# Patient Record
Sex: MALE | Race: WHITE | NOT HISPANIC OR LATINO | Employment: FULL TIME | URBAN - METROPOLITAN AREA
[De-identification: names, ages, dates, MRNs, and addresses within clinical notes are randomized per-mention and may not be internally consistent; named-entity substitution may affect disease eponyms.]

---

## 2017-02-06 ENCOUNTER — PATIENT MESSAGE (OUTPATIENT)
Dept: INTERNAL MEDICINE | Facility: CLINIC | Age: 32
End: 2017-02-06

## 2017-02-06 RX ORDER — ALBUTEROL SULFATE 90 UG/1
AEROSOL, METERED RESPIRATORY (INHALATION)
Qty: 18 G | Refills: 11 | Status: SHIPPED | OUTPATIENT
Start: 2017-02-06 | End: 2017-02-23 | Stop reason: SDUPTHER

## 2017-02-07 ENCOUNTER — OFFICE VISIT (OUTPATIENT)
Dept: INTERNAL MEDICINE | Facility: CLINIC | Age: 32
End: 2017-02-07
Payer: COMMERCIAL

## 2017-02-07 VITALS
TEMPERATURE: 98 F | WEIGHT: 248.25 LBS | SYSTOLIC BLOOD PRESSURE: 122 MMHG | RESPIRATION RATE: 16 BRPM | HEART RATE: 101 BPM | OXYGEN SATURATION: 96 % | HEIGHT: 72 IN | DIASTOLIC BLOOD PRESSURE: 88 MMHG | BODY MASS INDEX: 33.62 KG/M2

## 2017-02-07 DIAGNOSIS — J45.901 ASTHMA EXACERBATION: Primary | ICD-10-CM

## 2017-02-07 PROCEDURE — 3079F DIAST BP 80-89 MM HG: CPT | Mod: S$GLB,,, | Performed by: FAMILY MEDICINE

## 2017-02-07 PROCEDURE — 99999 PR PBB SHADOW E&M-EST. PATIENT-LVL III: CPT | Mod: PBBFAC,,, | Performed by: FAMILY MEDICINE

## 2017-02-07 PROCEDURE — 99214 OFFICE O/P EST MOD 30 MIN: CPT | Mod: S$GLB,,, | Performed by: FAMILY MEDICINE

## 2017-02-07 PROCEDURE — 3074F SYST BP LT 130 MM HG: CPT | Mod: S$GLB,,, | Performed by: FAMILY MEDICINE

## 2017-02-07 RX ORDER — FLUTICASONE FUROATE AND VILANTEROL 100; 25 UG/1; UG/1
1 POWDER RESPIRATORY (INHALATION) DAILY
Qty: 60 EACH | Refills: 11 | Status: SHIPPED | OUTPATIENT
Start: 2017-02-07 | End: 2017-05-25 | Stop reason: ALTCHOICE

## 2017-02-07 RX ORDER — AZELASTINE 1 MG/ML
1 SPRAY, METERED NASAL 2 TIMES DAILY
Qty: 30 ML | Refills: 11 | Status: SHIPPED | OUTPATIENT
Start: 2017-02-07 | End: 2017-05-25 | Stop reason: ALTCHOICE

## 2017-02-07 RX ORDER — MONTELUKAST SODIUM 10 MG/1
10 TABLET ORAL NIGHTLY
Qty: 30 TABLET | Refills: 11 | Status: SHIPPED | OUTPATIENT
Start: 2017-02-07 | End: 2017-05-25 | Stop reason: ALTCHOICE

## 2017-02-07 RX ORDER — BENZONATATE 200 MG/1
200 CAPSULE ORAL 3 TIMES DAILY PRN
Qty: 30 CAPSULE | Refills: 0 | Status: SHIPPED | OUTPATIENT
Start: 2017-02-07 | End: 2017-02-17

## 2017-02-07 NOTE — MR AVS SNAPSHOT
[facility]   [facility]  [place] [address]  Phone: [phone]  Fax: [phone]                  Roman Quinn   2017 8:40 AM   Office Visit    Description:  Male : 1985   Provider:  Hung Reid MD   Department:  [facility]           Reason for Visit     Shortness of Breath           Diagnoses this Visit        Comments    Asthma exacerbation    -  Primary            To Do List           Goals (5 Years of Data)     None      Follow-Up and Disposition     Return if symptoms worsen or fail to improve.       These Medications        Disp Refills Start End    fluticasone-vilanterol (BREO) 100-25 mcg/dose diskus inhaler 60 each 11 2017     Inhale 1 puff into the lungs once daily. Controller - Inhalation    Pharmacy: [facility]/pharmacy #[id number] - [place], [place] - [address] #: [phone]       azelastine (ASTELIN) 137 mcg (0.1 %) nasal spray 30 mL 11 2017    1 spray (137 mcg total) by Nasal route 2 (two) times daily. - Nasal    Pharmacy: [facility]/pharmacy #[id number] - [place], [place] - [address] #: [phone]       benzonatate (TESSALON) 200 MG capsule 30 capsule 0 2017    Take 1 capsule (200 mg total) by mouth 3 (three) times daily as needed for Cough. - Oral    Pharmacy: [facility]/pharmacy #[id number] - [place], [place] - [address] #: [phone]       montelukast (SINGULAIR) 10 mg tablet 30 tablet 11 2017    Take 1 tablet (10 mg total) by mouth every evening. - Oral    Pharmacy: [facility]/pharmacy #[id number] - [place], [place] - [address] #: [phone]         [facility]     [facility] On Call Nurse Care Line -  Assistance  Registered nurses in the [facility] On Call Center provide clinical advisement, health education, appointment booking, and other advisory services.  Call for this free service at [phone].             Medications           Message  regarding Medications     Verify the changes and/or additions to your medication regime listed below are the same as discussed with your clinician today.  If any of these changes or additions are incorrect, please notify your healthcare provider.        START taking these NEW medications        Refills    fluticasone-vilanterol (BREO) 100-25 mcg/dose diskus inhaler 11    Sig: Inhale 1 puff into the lungs once daily. Controller    Class: Normal    Route: Inhalation    azelastine (ASTELIN) 137 mcg (0.1 %) nasal spray 11    Si spray (137 mcg total) by Nasal route 2 (two) times daily.    Class: Normal    Route: Nasal    benzonatate (TESSALON) 200 MG capsule 0    Sig: Take 1 capsule (200 mg total) by mouth 3 (three) times daily as needed for Cough.    Class: Normal    Route: Oral    montelukast (SINGULAIR) 10 mg tablet 11    Sig: Take 1 tablet (10 mg total) by mouth every evening.    Class: Normal    Route: Oral           Verify that the below list of medications is an accurate representation of the medications you are currently taking.  If none reported, the list may be blank. If incorrect, please contact your healthcare provider. Carry this list with you in case of emergency.           Current Medications     albuterol (PROAIR HFA) 90 mcg/actuation inhaler inhale 2 puffs by mouth every 6 hours if needed for shortness of breath and wheezing    amlodipine-olmesartan (HANG) 5-20 mg per tablet Take 1 tablet by mouth once daily.    dextromethorphan-guaifenesin  mg (MUCINEX DM)  mg per 12 hr tablet Take 1 tablet by mouth every 12 (twelve) hours.    fluticasone (FLONASE) 50 mcg/actuation nasal spray 2 sprays by Each Nare route once daily.    simvastatin (ZOCOR) 20 MG tablet Take 1 tablet (20 mg total) by mouth every evening.    azelastine (ASTELIN) 137 mcg (0.1 %) nasal spray 1 spray (137 mcg total) by Nasal route 2 (two) times daily.    benzonatate (TESSALON) 200 MG capsule Take 1 capsule (200 mg total) by  mouth 3 (three) times daily as needed for Cough.    desoximetasone (TOPICORT) 0.25 % cream Apply topically 2 (two) times daily.    fluticasone-vilanterol (BREO) 100-25 mcg/dose diskus inhaler Inhale 1 puff into the lungs once daily. Controller    montelukast (SINGULAIR) 10 mg tablet Take 1 tablet (10 mg total) by mouth every evening.           Clinical Reference Information           Your Vitals Were     BP Pulse Temp Resp Height Weight    122/88 (BP Location: Right arm, Patient Position: Sitting, BP Method: Manual) 101 98.4 °F (36.9 °C) (Oral) 16 6' (1.829 m) 112.6 kg (248 lb 3.8 oz)    SpO2 BMI             96% 33.67 kg/m2         Blood Pressure          Most Recent Value    BP  122/88      Allergies as of 2/7/2017     Iodine And Iodide Containing Products      Immunizations Administered on Date of Encounter - 2/7/2017     None      Language Assistance Services     ATTENTION: Language assistance services are available, free of charge. Please call [phone].      ATENCIÓN: Si habla marianañol, tiene a flores disposición servicios gratuitos de asistencia lingüística. Llame al [phone].     JASSON Ý: N?u b?n nói Ti?ng Vi?t, có các d?ch v? h? tr? ngôn ng? mi?n phí dành cho b?n. G?i s? [phone].         [facility] complies with applicable Federal civil rights laws and does not discriminate on the basis of race, color, national origin, age, disability, or sex.

## 2017-02-07 NOTE — PROGRESS NOTES
Subjective:       Patient ID: Roman Quinn is a 31 y.o. male.    Chief Complaint: Shortness of Breath    HPI 31-year-old white male with seasonal asthma presents to clinic today secondary to a complaint of worsening asthma symptoms with increased coughing, shortness of breath, wheezing, nasal congestion, postnasal drip, and runny nose for the past 2 weeks.  He feels that he has been using his pro-air inhaler more frequently and has lost count of the number of times per day he is required to use his inhaler.  He also reports frequently awakening at night to have to use his inhaler.  He has also started using Mucinex D without relief.  Review of Systems   Constitutional: Negative for appetite change, chills, fatigue and fever.   HENT: Positive for congestion, postnasal drip and rhinorrhea. Negative for ear pain, hearing loss, sinus pressure, sore throat and tinnitus.    Eyes: Negative for redness, itching and visual disturbance.   Respiratory: Positive for cough, chest tightness, shortness of breath and wheezing.    Cardiovascular: Negative for chest pain and palpitations.   Gastrointestinal: Negative for abdominal pain, constipation, diarrhea, nausea and vomiting.   Genitourinary: Negative for decreased urine volume, difficulty urinating, dysuria, frequency, hematuria and urgency.   Musculoskeletal: Negative for back pain, myalgias, neck pain and neck stiffness.   Skin: Negative for rash.   Neurological: Negative for dizziness, light-headedness and headaches.   Psychiatric/Behavioral: Negative.        Objective:      Physical Exam   Constitutional: He is oriented to person, place, and time. He appears well-developed and well-nourished. No distress.   HENT:   Head: Normocephalic and atraumatic.   Right Ear: Hearing, tympanic membrane, external ear and ear canal normal.   Left Ear: Hearing, tympanic membrane, external ear and ear canal normal.   Nose: Mucosal edema and rhinorrhea present.   Mouth/Throat:  Oropharynx is clear and moist. No oropharyngeal exudate.   Eyes: Conjunctivae and EOM are normal. Pupils are equal, round, and reactive to light. Right eye exhibits no discharge. Left eye exhibits no discharge. No scleral icterus.   Neck: Normal range of motion. Neck supple. No JVD present. No tracheal deviation present. No thyromegaly present.   Cardiovascular: Normal rate, regular rhythm, normal heart sounds and intact distal pulses.  Exam reveals no gallop and no friction rub.    No murmur heard.  Pulmonary/Chest: Effort normal. No stridor. No respiratory distress. He has wheezes. He has no rales.   Abdominal: Soft. Bowel sounds are normal. He exhibits no distension and no mass. There is no tenderness. There is no rebound and no guarding.   Musculoskeletal: Normal range of motion. He exhibits no edema or tenderness.   Lymphadenopathy:     He has no cervical adenopathy.   Neurological: He is alert and oriented to person, place, and time.   Skin: Skin is warm and dry. No rash noted. He is not diaphoretic. No erythema. No pallor.   Psychiatric: He has a normal mood and affect. His behavior is normal. Judgment and thought content normal.   Nursing note and vitals reviewed.      Assessment:       1. Asthma exacerbation        Plan:       Asthma exacerbation  -     fluticasone-vilanterol (BREO) 100-25 mcg/dose diskus inhaler; Inhale 1 puff into the lungs once daily. Controller  Dispense: 60 each; Refill: 11  -     azelastine (ASTELIN) 137 mcg (0.1 %) nasal spray; 1 spray (137 mcg total) by Nasal route 2 (two) times daily.  Dispense: 30 mL; Refill: 11  -     benzonatate (TESSALON) 200 MG capsule; Take 1 capsule (200 mg total) by mouth 3 (three) times daily as needed for Cough.  Dispense: 30 capsule; Refill: 0  -     montelukast (SINGULAIR) 10 mg tablet; Take 1 tablet (10 mg total) by mouth every evening.  Dispense: 30 tablet; Refill: 11     continue albuterol HFA 2 puffs inhaled every 6 hours when necessary shortness of  breath or wheezing.  Continue Flonase nasal spray 2 sprays per nostril daily.  Return to clinic as needed if symptoms persist or worsen.

## 2017-02-23 ENCOUNTER — PATIENT MESSAGE (OUTPATIENT)
Dept: INTERNAL MEDICINE | Facility: CLINIC | Age: 32
End: 2017-02-23

## 2017-02-23 RX ORDER — ALBUTEROL SULFATE 90 UG/1
AEROSOL, METERED RESPIRATORY (INHALATION)
Qty: 18 G | Refills: 11 | Status: SHIPPED | OUTPATIENT
Start: 2017-02-23 | End: 2017-05-25

## 2017-02-23 RX ORDER — ALBUTEROL SULFATE 90 UG/1
AEROSOL, METERED RESPIRATORY (INHALATION)
Qty: 8.5 INHALER | Refills: 10 | Status: SHIPPED | OUTPATIENT
Start: 2017-02-23 | End: 2017-05-25 | Stop reason: SDUPTHER

## 2017-05-05 ENCOUNTER — TELEPHONE (OUTPATIENT)
Dept: INTERNAL MEDICINE | Facility: CLINIC | Age: 32
End: 2017-05-05

## 2017-05-05 DIAGNOSIS — Z00.00 ANNUAL PHYSICAL EXAM: Primary | ICD-10-CM

## 2017-05-05 NOTE — TELEPHONE ENCOUNTER
----- Message from Sunshine Fraser sent at 5/5/2017  9:53 AM CDT -----  Contact: fyi  Doctor appointment and lab have been scheduled.  Please link lab orders to the lab appointment.  Date of doctor appointment:  05/25/17  Physical or EP:  epp  Date of lab appointment:  05/22/17  Comments:

## 2017-05-22 ENCOUNTER — LAB VISIT (OUTPATIENT)
Dept: LAB | Facility: HOSPITAL | Age: 32
End: 2017-05-22
Attending: FAMILY MEDICINE
Payer: COMMERCIAL

## 2017-05-22 ENCOUNTER — PATIENT MESSAGE (OUTPATIENT)
Dept: INTERNAL MEDICINE | Facility: CLINIC | Age: 32
End: 2017-05-22

## 2017-05-22 DIAGNOSIS — Z00.00 ANNUAL PHYSICAL EXAM: ICD-10-CM

## 2017-05-22 DIAGNOSIS — R73.01 IMPAIRED FASTING GLUCOSE: ICD-10-CM

## 2017-05-22 LAB
25(OH)D3+25(OH)D2 SERPL-MCNC: 24 NG/ML
ALBUMIN SERPL BCP-MCNC: 4.1 G/DL
ALP SERPL-CCNC: 58 U/L
ALT SERPL W/O P-5'-P-CCNC: 15 U/L
ANION GAP SERPL CALC-SCNC: 11 MMOL/L
AST SERPL-CCNC: 23 U/L
BASOPHILS # BLD AUTO: 0.04 K/UL
BASOPHILS NFR BLD: 0.6 %
BILIRUB SERPL-MCNC: 1 MG/DL
BUN SERPL-MCNC: 15 MG/DL
CALCIUM SERPL-MCNC: 9.4 MG/DL
CHLORIDE SERPL-SCNC: 103 MMOL/L
CHOLEST/HDLC SERPL: 4.2 {RATIO}
CO2 SERPL-SCNC: 26 MMOL/L
CREAT SERPL-MCNC: 1.2 MG/DL
DIFFERENTIAL METHOD: NORMAL
EOSINOPHIL # BLD AUTO: 0.3 K/UL
EOSINOPHIL NFR BLD: 4.2 %
ERYTHROCYTE [DISTWIDTH] IN BLOOD BY AUTOMATED COUNT: 12.7 %
EST. GFR  (AFRICAN AMERICAN): >60 ML/MIN/1.73 M^2
EST. GFR  (NON AFRICAN AMERICAN): >60 ML/MIN/1.73 M^2
GLUCOSE SERPL-MCNC: 129 MG/DL
HCT VFR BLD AUTO: 46.2 %
HDL/CHOLESTEROL RATIO: 23.7 %
HDLC SERPL-MCNC: 194 MG/DL
HDLC SERPL-MCNC: 46 MG/DL
HGB BLD-MCNC: 15.8 G/DL
LDLC SERPL CALC-MCNC: 127.2 MG/DL
LYMPHOCYTES # BLD AUTO: 2.2 K/UL
LYMPHOCYTES NFR BLD: 31 %
MCH RBC QN AUTO: 30.7 PG
MCHC RBC AUTO-ENTMCNC: 34.2 %
MCV RBC AUTO: 90 FL
MONOCYTES # BLD AUTO: 0.7 K/UL
MONOCYTES NFR BLD: 10.2 %
NEUTROPHILS # BLD AUTO: 3.8 K/UL
NEUTROPHILS NFR BLD: 54 %
NONHDLC SERPL-MCNC: 148 MG/DL
PLATELET # BLD AUTO: 269 K/UL
PMV BLD AUTO: 10.3 FL
POTASSIUM SERPL-SCNC: 3.8 MMOL/L
PROT SERPL-MCNC: 7.2 G/DL
RBC # BLD AUTO: 5.14 M/UL
SODIUM SERPL-SCNC: 140 MMOL/L
TRIGL SERPL-MCNC: 104 MG/DL
TSH SERPL DL<=0.005 MIU/L-ACNC: 1.23 UIU/ML
WBC # BLD AUTO: 7.09 K/UL

## 2017-05-22 PROCEDURE — 80053 COMPREHEN METABOLIC PANEL: CPT

## 2017-05-22 PROCEDURE — 85025 COMPLETE CBC W/AUTO DIFF WBC: CPT

## 2017-05-22 PROCEDURE — 80061 LIPID PANEL: CPT

## 2017-05-22 PROCEDURE — 36415 COLL VENOUS BLD VENIPUNCTURE: CPT | Mod: PO

## 2017-05-22 PROCEDURE — 82306 VITAMIN D 25 HYDROXY: CPT

## 2017-05-22 PROCEDURE — 84443 ASSAY THYROID STIM HORMONE: CPT

## 2017-05-25 ENCOUNTER — LAB VISIT (OUTPATIENT)
Dept: LAB | Facility: HOSPITAL | Age: 32
End: 2017-05-25
Attending: FAMILY MEDICINE
Payer: COMMERCIAL

## 2017-05-25 ENCOUNTER — OFFICE VISIT (OUTPATIENT)
Dept: INTERNAL MEDICINE | Facility: CLINIC | Age: 32
End: 2017-05-25
Payer: COMMERCIAL

## 2017-05-25 VITALS
BODY MASS INDEX: 33.51 KG/M2 | DIASTOLIC BLOOD PRESSURE: 80 MMHG | HEART RATE: 98 BPM | OXYGEN SATURATION: 95 % | HEIGHT: 72 IN | RESPIRATION RATE: 16 BRPM | TEMPERATURE: 98 F | SYSTOLIC BLOOD PRESSURE: 126 MMHG | WEIGHT: 247.38 LBS

## 2017-05-25 DIAGNOSIS — J45.20 MILD INTERMITTENT ASTHMA WITHOUT COMPLICATION: ICD-10-CM

## 2017-05-25 DIAGNOSIS — R73.01 IMPAIRED FASTING GLUCOSE: ICD-10-CM

## 2017-05-25 DIAGNOSIS — Z00.00 WELL ADULT EXAM: Primary | ICD-10-CM

## 2017-05-25 DIAGNOSIS — E66.9 NON MORBID OBESITY, UNSPECIFIED OBESITY TYPE: ICD-10-CM

## 2017-05-25 DIAGNOSIS — E78.5 HYPERLIPIDEMIA, UNSPECIFIED HYPERLIPIDEMIA TYPE: ICD-10-CM

## 2017-05-25 DIAGNOSIS — I10 ESSENTIAL HYPERTENSION: ICD-10-CM

## 2017-05-25 LAB
ESTIMATED AVG GLUCOSE: 111 MG/DL
GLUCOSE SERPL-MCNC: 112 MG/DL
HBA1C MFR BLD HPLC: 5.5 %

## 2017-05-25 PROCEDURE — 99395 PREV VISIT EST AGE 18-39: CPT | Mod: S$GLB,,, | Performed by: FAMILY MEDICINE

## 2017-05-25 PROCEDURE — 99999 PR PBB SHADOW E&M-EST. PATIENT-LVL III: CPT | Mod: PBBFAC,,, | Performed by: FAMILY MEDICINE

## 2017-05-25 RX ORDER — FLUTICASONE PROPIONATE 50 MCG
2 SPRAY, SUSPENSION (ML) NASAL DAILY
Qty: 16 G | Refills: 11 | Status: SHIPPED | OUTPATIENT
Start: 2017-05-25 | End: 2017-12-01

## 2017-05-25 RX ORDER — ALBUTEROL SULFATE 90 UG/1
AEROSOL, METERED RESPIRATORY (INHALATION)
Qty: 18 INHALER | Refills: 11 | Status: SHIPPED | OUTPATIENT
Start: 2017-05-25 | End: 2018-06-17 | Stop reason: SDUPTHER

## 2017-05-25 RX ORDER — SIMVASTATIN 20 MG/1
20 TABLET, FILM COATED ORAL NIGHTLY
Qty: 90 TABLET | Refills: 3 | Status: SHIPPED | OUTPATIENT
Start: 2017-05-25 | End: 2018-03-23 | Stop reason: SDUPTHER

## 2017-05-25 RX ORDER — AMLODIPINE AND OLMESARTAN MEDOXOMIL 5; 20 MG/1; MG/1
1 TABLET ORAL DAILY
Qty: 90 TABLET | Refills: 3 | Status: SHIPPED | OUTPATIENT
Start: 2017-05-25 | End: 2018-03-23 | Stop reason: SDUPTHER

## 2017-05-25 NOTE — PROGRESS NOTES
Subjective:       Patient ID: Roman Quinn is a 31 y.o. male.    Chief Complaint: Annual Exam (with labs done this AM)    HPI 31-year-old white male presents to clinic today for annual physical exam.  He continues to have well-controlled hypertension on Sarah 5/20 mg daily.  Hyperlipidemia is stable on simvastatin 20 mg daily.  He continues to have stable asthma on as needed albuterol.  He has a past surgical history of hernia repair.  He has a family history of diabetes in his mother.  He is up-to-date with all vaccines.  Review of Systems   Constitutional: Negative for appetite change, chills, fatigue and fever.   HENT: Negative for congestion, ear pain, hearing loss, postnasal drip, rhinorrhea, sinus pressure, sore throat and tinnitus.    Eyes: Negative for redness, itching and visual disturbance.   Respiratory: Negative for cough, chest tightness and shortness of breath.    Cardiovascular: Negative for chest pain and palpitations.   Gastrointestinal: Negative for abdominal pain, constipation, diarrhea, nausea and vomiting.   Genitourinary: Negative for decreased urine volume, difficulty urinating, dysuria, frequency, hematuria and urgency.   Musculoskeletal: Negative for back pain, myalgias, neck pain and neck stiffness.   Skin: Negative for rash.   Neurological: Negative for dizziness, light-headedness and headaches.   Psychiatric/Behavioral: Negative.        Objective:      Physical Exam   Constitutional: He is oriented to person, place, and time. He appears well-developed and well-nourished. No distress.   HENT:   Head: Normocephalic and atraumatic.   Right Ear: External ear normal.   Left Ear: External ear normal.   Nose: Nose normal.   Mouth/Throat: Oropharynx is clear and moist. No oropharyngeal exudate.   Eyes: Conjunctivae and EOM are normal. Pupils are equal, round, and reactive to light. Right eye exhibits no discharge. Left eye exhibits no discharge. No scleral icterus.   Neck: Normal range of  motion. Neck supple. No JVD present. No tracheal deviation present. No thyromegaly present.   Cardiovascular: Normal rate, regular rhythm, normal heart sounds and intact distal pulses.  Exam reveals no gallop and no friction rub.    No murmur heard.  Pulmonary/Chest: Effort normal and breath sounds normal. No stridor. No respiratory distress. He has no wheezes. He has no rales.   Abdominal: Soft. Bowel sounds are normal. He exhibits no distension and no mass. There is no tenderness. There is no rebound and no guarding.   Musculoskeletal: Normal range of motion. He exhibits no edema or tenderness.   Lymphadenopathy:     He has no cervical adenopathy.   Neurological: He is alert and oriented to person, place, and time.   Skin: Skin is warm and dry. No rash noted. He is not diaphoretic. No erythema. No pallor.   Psychiatric: He has a normal mood and affect. His behavior is normal. Judgment and thought content normal.   Nursing note and vitals reviewed.      Assessment:       1. Well adult exam    2. Essential hypertension    3. Hyperlipidemia, unspecified hyperlipidemia type    4. Impaired fasting glucose    5. Mild intermittent asthma without complication    6. Non morbid obesity, unspecified obesity type        Plan:       1.  Labs have been reviewed and are overall within normal limits except for an elevated fasting glucose level.  Recommend repeat fasting glucose and A1c.  2.  Continue Sarah 5/20 mg daily.  Hypertension is well controlled.  3.  Continue simvastatin 20 mg daily.  Hypertension is well controlled.  4.  Encourage diet and exercise.  5.  Continue use of Flonase nasal spray and albuterol as needed.  Asthma is stable.  6.  Return to clinic as needed or in one year for annual exam.

## 2017-12-01 ENCOUNTER — PATIENT MESSAGE (OUTPATIENT)
Dept: INTERNAL MEDICINE | Facility: CLINIC | Age: 32
End: 2017-12-01

## 2017-12-01 RX ORDER — AZITHROMYCIN 250 MG/1
TABLET, FILM COATED ORAL
Qty: 6 TABLET | Refills: 0 | Status: SHIPPED | OUTPATIENT
Start: 2017-12-01 | End: 2017-12-18 | Stop reason: ALTCHOICE

## 2017-12-01 RX ORDER — BENZONATATE 200 MG/1
200 CAPSULE ORAL 3 TIMES DAILY PRN
Qty: 30 CAPSULE | Refills: 0 | Status: SHIPPED | OUTPATIENT
Start: 2017-12-01 | End: 2017-12-11

## 2017-12-01 RX ORDER — FLUTICASONE PROPIONATE 50 MCG
2 SPRAY, SUSPENSION (ML) NASAL DAILY
Qty: 16 G | Refills: 11 | Status: SHIPPED | OUTPATIENT
Start: 2017-12-01 | End: 2017-12-31

## 2017-12-08 ENCOUNTER — PATIENT MESSAGE (OUTPATIENT)
Dept: INTERNAL MEDICINE | Facility: CLINIC | Age: 32
End: 2017-12-08

## 2017-12-14 ENCOUNTER — PATIENT MESSAGE (OUTPATIENT)
Dept: INTERNAL MEDICINE | Facility: CLINIC | Age: 32
End: 2017-12-14

## 2017-12-18 ENCOUNTER — OFFICE VISIT (OUTPATIENT)
Dept: INTERNAL MEDICINE | Facility: CLINIC | Age: 32
End: 2017-12-18
Payer: COMMERCIAL

## 2017-12-18 VITALS
HEIGHT: 72 IN | TEMPERATURE: 98 F | WEIGHT: 255.75 LBS | DIASTOLIC BLOOD PRESSURE: 78 MMHG | HEART RATE: 102 BPM | RESPIRATION RATE: 18 BRPM | SYSTOLIC BLOOD PRESSURE: 128 MMHG | BODY MASS INDEX: 34.64 KG/M2

## 2017-12-18 DIAGNOSIS — J45.901 EXACERBATION OF ASTHMA, UNSPECIFIED ASTHMA SEVERITY, UNSPECIFIED WHETHER PERSISTENT: Primary | ICD-10-CM

## 2017-12-18 PROCEDURE — 99999 PR PBB SHADOW E&M-EST. PATIENT-LVL III: CPT | Mod: PBBFAC,,, | Performed by: FAMILY MEDICINE

## 2017-12-18 PROCEDURE — 99214 OFFICE O/P EST MOD 30 MIN: CPT | Mod: S$GLB,,, | Performed by: FAMILY MEDICINE

## 2017-12-18 RX ORDER — METHYLPREDNISOLONE 4 MG/1
TABLET ORAL
Qty: 1 PACKAGE | Refills: 0 | Status: SHIPPED | OUTPATIENT
Start: 2017-12-18 | End: 2018-01-08

## 2017-12-18 RX ORDER — AMOXICILLIN AND CLAVULANATE POTASSIUM 875; 125 MG/1; MG/1
1 TABLET, FILM COATED ORAL EVERY 12 HOURS
Qty: 20 TABLET | Refills: 0 | Status: SHIPPED | OUTPATIENT
Start: 2017-12-18 | End: 2017-12-28

## 2017-12-18 RX ORDER — BENZONATATE 200 MG/1
200 CAPSULE ORAL 3 TIMES DAILY PRN
Qty: 30 CAPSULE | Refills: 0 | Status: SHIPPED | OUTPATIENT
Start: 2017-12-18 | End: 2017-12-28

## 2017-12-18 NOTE — PROGRESS NOTES
Subjective:       Patient ID: Roman Quinn is a 32 y.o. male.    Chief Complaint: Chest Congestion and Cough    HPI 32-year-old white male with intermittent asthma presents to clinic today secondary to a complaint of worsening postnasal drip, runny nose, chest congestion, cough, and cough induced shortness of breath for the past 2 weeks.  He has already taken a Z-Trevin and has been using Tessalon Perles without relief.  He reports that he has been using his albuterol inhaler up to 10 times per day.  Review of Systems   Constitutional: Negative for appetite change, chills, fatigue and fever.   HENT: Positive for postnasal drip and rhinorrhea. Negative for congestion, ear pain, hearing loss, sinus pressure, sore throat and tinnitus.    Eyes: Negative for redness, itching and visual disturbance.   Respiratory: Positive for cough, chest tightness and shortness of breath.    Cardiovascular: Negative for chest pain and palpitations.   Gastrointestinal: Negative for abdominal pain, constipation, diarrhea, nausea and vomiting.   Genitourinary: Negative for decreased urine volume, difficulty urinating, dysuria, frequency, hematuria and urgency.   Musculoskeletal: Negative for back pain, myalgias, neck pain and neck stiffness.   Skin: Negative for rash.   Neurological: Negative for dizziness, light-headedness and headaches.   Psychiatric/Behavioral: Negative.        Objective:      Physical Exam   Constitutional: He is oriented to person, place, and time. He appears well-developed and well-nourished. No distress.   HENT:   Head: Normocephalic and atraumatic.   Right Ear: Hearing, tympanic membrane, external ear and ear canal normal.   Left Ear: Hearing, tympanic membrane, external ear and ear canal normal.   Nose: Mucosal edema and rhinorrhea present.   Mouth/Throat: Oropharynx is clear and moist. No oropharyngeal exudate.   Eyes: Conjunctivae and EOM are normal. Pupils are equal, round, and reactive to light. Right eye  exhibits no discharge. Left eye exhibits no discharge. No scleral icterus.   Neck: Normal range of motion. Neck supple. No JVD present. No tracheal deviation present. No thyromegaly present.   Cardiovascular: Normal rate, regular rhythm, normal heart sounds and intact distal pulses.  Exam reveals no gallop and no friction rub.    No murmur heard.  Pulmonary/Chest: Effort normal. No stridor. No respiratory distress. He has wheezes. He has no rales.   Abdominal: Soft. Bowel sounds are normal. He exhibits no distension and no mass. There is no tenderness. There is no rebound and no guarding.   Musculoskeletal: Normal range of motion. He exhibits no edema or tenderness.   Lymphadenopathy:     He has no cervical adenopathy.   Neurological: He is alert and oriented to person, place, and time.   Skin: Skin is warm and dry. No rash noted. He is not diaphoretic. No erythema. No pallor.   Psychiatric: He has a normal mood and affect. His behavior is normal. Judgment and thought content normal.   Nursing note and vitals reviewed.      Assessment:       1. Exacerbation of asthma, unspecified asthma severity, unspecified whether persistent        Plan:       Exacerbation of asthma, unspecified asthma severity, unspecified whether persistent  -     amoxicillin-clavulanate 875-125mg (AUGMENTIN) 875-125 mg per tablet; Take 1 tablet by mouth every 12 (twelve) hours.  Dispense: 20 tablet; Refill: 0  -     benzonatate (TESSALON) 200 MG capsule; Take 1 capsule (200 mg total) by mouth 3 (three) times daily as needed for Cough.  Dispense: 30 capsule; Refill: 0  -     methylPREDNISolone (MEDROL DOSEPACK) 4 mg tablet; use as directed  Dispense: 1 Package; Refill: 0      Continue albuterol HFA as needed.  Tylenol and ibuprofen as needed for fever or pain.  Over-the-counter Claritin nightly.  Return to clinic as needed if symptoms persist or worsen.

## 2018-01-21 ENCOUNTER — PATIENT MESSAGE (OUTPATIENT)
Dept: INTERNAL MEDICINE | Facility: CLINIC | Age: 33
End: 2018-01-21

## 2018-01-22 RX ORDER — AZITHROMYCIN 250 MG/1
TABLET, FILM COATED ORAL
Qty: 6 TABLET | Refills: 0 | Status: SHIPPED | OUTPATIENT
Start: 2018-01-22 | End: 2018-03-23 | Stop reason: ALTCHOICE

## 2018-02-16 ENCOUNTER — PATIENT MESSAGE (OUTPATIENT)
Dept: DERMATOLOGY | Facility: CLINIC | Age: 33
End: 2018-02-16

## 2018-03-06 ENCOUNTER — TELEPHONE (OUTPATIENT)
Dept: INTERNAL MEDICINE | Facility: CLINIC | Age: 33
End: 2018-03-06

## 2018-03-06 DIAGNOSIS — I10 ESSENTIAL HYPERTENSION: ICD-10-CM

## 2018-03-06 DIAGNOSIS — R73.01 IMPAIRED FASTING GLUCOSE: ICD-10-CM

## 2018-03-06 DIAGNOSIS — E78.5 HYPERLIPIDEMIA, UNSPECIFIED HYPERLIPIDEMIA TYPE: ICD-10-CM

## 2018-03-06 DIAGNOSIS — Z00.00 WELL ADULT EXAM: Primary | ICD-10-CM

## 2018-03-19 ENCOUNTER — LAB VISIT (OUTPATIENT)
Dept: LAB | Facility: HOSPITAL | Age: 33
End: 2018-03-19
Attending: FAMILY MEDICINE
Payer: COMMERCIAL

## 2018-03-19 DIAGNOSIS — R73.01 IMPAIRED FASTING GLUCOSE: ICD-10-CM

## 2018-03-19 DIAGNOSIS — E78.5 HYPERLIPIDEMIA, UNSPECIFIED HYPERLIPIDEMIA TYPE: ICD-10-CM

## 2018-03-19 DIAGNOSIS — Z00.00 WELL ADULT EXAM: ICD-10-CM

## 2018-03-19 LAB
25(OH)D3+25(OH)D2 SERPL-MCNC: 22 NG/ML
ALBUMIN SERPL BCP-MCNC: 4 G/DL
ALP SERPL-CCNC: 60 U/L
ALT SERPL W/O P-5'-P-CCNC: 17 U/L
ANION GAP SERPL CALC-SCNC: 8 MMOL/L
AST SERPL-CCNC: 25 U/L
BASOPHILS # BLD AUTO: 0.04 K/UL
BASOPHILS NFR BLD: 0.8 %
BILIRUB SERPL-MCNC: 0.5 MG/DL
BUN SERPL-MCNC: 19 MG/DL
CALCIUM SERPL-MCNC: 9.2 MG/DL
CHLORIDE SERPL-SCNC: 107 MMOL/L
CHOLEST SERPL-MCNC: 187 MG/DL
CHOLEST/HDLC SERPL: 4.6 {RATIO}
CO2 SERPL-SCNC: 26 MMOL/L
CREAT SERPL-MCNC: 1 MG/DL
DIFFERENTIAL METHOD: NORMAL
EOSINOPHIL # BLD AUTO: 0.3 K/UL
EOSINOPHIL NFR BLD: 5.3 %
ERYTHROCYTE [DISTWIDTH] IN BLOOD BY AUTOMATED COUNT: 12.8 %
EST. GFR  (AFRICAN AMERICAN): >60 ML/MIN/1.73 M^2
EST. GFR  (NON AFRICAN AMERICAN): >60 ML/MIN/1.73 M^2
ESTIMATED AVG GLUCOSE: 105 MG/DL
GLUCOSE SERPL-MCNC: 112 MG/DL
HBA1C MFR BLD HPLC: 5.3 %
HCT VFR BLD AUTO: 44.1 %
HDLC SERPL-MCNC: 41 MG/DL
HDLC SERPL: 21.9 %
HGB BLD-MCNC: 15 G/DL
IMM GRANULOCYTES # BLD AUTO: 0.01 K/UL
IMM GRANULOCYTES NFR BLD AUTO: 0.2 %
LDLC SERPL CALC-MCNC: 126.6 MG/DL
LYMPHOCYTES # BLD AUTO: 1.8 K/UL
LYMPHOCYTES NFR BLD: 36.4 %
MCH RBC QN AUTO: 30.2 PG
MCHC RBC AUTO-ENTMCNC: 34 G/DL
MCV RBC AUTO: 89 FL
MONOCYTES # BLD AUTO: 0.6 K/UL
MONOCYTES NFR BLD: 11.3 %
NEUTROPHILS # BLD AUTO: 2.3 K/UL
NEUTROPHILS NFR BLD: 46 %
NONHDLC SERPL-MCNC: 146 MG/DL
NRBC BLD-RTO: 0 /100 WBC
PLATELET # BLD AUTO: 270 K/UL
PMV BLD AUTO: 10.3 FL
POTASSIUM SERPL-SCNC: 4.5 MMOL/L
PROT SERPL-MCNC: 7 G/DL
RBC # BLD AUTO: 4.97 M/UL
SODIUM SERPL-SCNC: 141 MMOL/L
T4 FREE SERPL-MCNC: 0.91 NG/DL
TRIGL SERPL-MCNC: 97 MG/DL
TSH SERPL DL<=0.005 MIU/L-ACNC: 1.52 UIU/ML
WBC # BLD AUTO: 4.94 K/UL

## 2018-03-19 PROCEDURE — 84443 ASSAY THYROID STIM HORMONE: CPT

## 2018-03-19 PROCEDURE — 84439 ASSAY OF FREE THYROXINE: CPT

## 2018-03-19 PROCEDURE — 36415 COLL VENOUS BLD VENIPUNCTURE: CPT | Mod: PO

## 2018-03-19 PROCEDURE — 80053 COMPREHEN METABOLIC PANEL: CPT

## 2018-03-19 PROCEDURE — 80061 LIPID PANEL: CPT

## 2018-03-19 PROCEDURE — 85025 COMPLETE CBC W/AUTO DIFF WBC: CPT

## 2018-03-19 PROCEDURE — 82306 VITAMIN D 25 HYDROXY: CPT

## 2018-03-19 PROCEDURE — 83036 HEMOGLOBIN GLYCOSYLATED A1C: CPT

## 2018-03-23 ENCOUNTER — OFFICE VISIT (OUTPATIENT)
Dept: INTERNAL MEDICINE | Facility: CLINIC | Age: 33
End: 2018-03-23
Payer: COMMERCIAL

## 2018-03-23 VITALS
RESPIRATION RATE: 18 BRPM | HEIGHT: 72 IN | WEIGHT: 250.44 LBS | DIASTOLIC BLOOD PRESSURE: 80 MMHG | BODY MASS INDEX: 33.92 KG/M2 | TEMPERATURE: 98 F | SYSTOLIC BLOOD PRESSURE: 110 MMHG | HEART RATE: 101 BPM

## 2018-03-23 DIAGNOSIS — I10 ESSENTIAL HYPERTENSION: ICD-10-CM

## 2018-03-23 DIAGNOSIS — J45.20 MILD INTERMITTENT ASTHMA WITHOUT COMPLICATION: ICD-10-CM

## 2018-03-23 DIAGNOSIS — E66.9 OBESITY, UNSPECIFIED CLASSIFICATION, UNSPECIFIED OBESITY TYPE, UNSPECIFIED WHETHER SERIOUS COMORBIDITY PRESENT: ICD-10-CM

## 2018-03-23 DIAGNOSIS — Z00.00 WELL ADULT EXAM: Primary | ICD-10-CM

## 2018-03-23 DIAGNOSIS — E78.5 HYPERLIPIDEMIA, UNSPECIFIED HYPERLIPIDEMIA TYPE: ICD-10-CM

## 2018-03-23 DIAGNOSIS — R73.01 IMPAIRED FASTING GLUCOSE: ICD-10-CM

## 2018-03-23 PROCEDURE — 99999 PR PBB SHADOW E&M-EST. PATIENT-LVL III: CPT | Mod: PBBFAC,,, | Performed by: FAMILY MEDICINE

## 2018-03-23 PROCEDURE — 99395 PREV VISIT EST AGE 18-39: CPT | Mod: S$GLB,,, | Performed by: FAMILY MEDICINE

## 2018-03-23 RX ORDER — AMLODIPINE AND OLMESARTAN MEDOXOMIL 5; 20 MG/1; MG/1
1 TABLET ORAL DAILY
Qty: 90 TABLET | Refills: 3 | Status: SHIPPED | OUTPATIENT
Start: 2018-03-23 | End: 2018-10-26

## 2018-03-23 RX ORDER — SIMVASTATIN 20 MG/1
20 TABLET, FILM COATED ORAL NIGHTLY
Qty: 90 TABLET | Refills: 3 | Status: SHIPPED | OUTPATIENT
Start: 2018-03-23 | End: 2019-05-29 | Stop reason: SDUPTHER

## 2018-03-23 RX ORDER — CLOSTRIDIUM TETANI TOXOID ANTIGEN (FORMALDEHYDE INACTIVATED), CORYNEBACTERIUM DIPHTHERIAE TOXOID ANTIGEN (FORMALDEHYDE INACTIVATED), BORDETELLA PERTUSSIS TOXOID ANTIGEN (GLUTARALDEHYDE INACTIVATED), BORDETELLA PERTUSSIS FILAMENTOUS HEMAGGLUTININ ANTIGEN (FORMALDEHYDE INACTIVATED), BORDETELLA PERTUSSIS PERTACTIN ANTIGEN, AND BORDETELLA PERTUSSIS FIMBRIAE 2/3 ANTIGEN 5; 2; 2.5; 5; 3; 5 [LF]/.5ML; [LF]/.5ML; UG/.5ML; UG/.5ML; UG/.5ML; UG/.5ML
INJECTION, SUSPENSION INTRAMUSCULAR
Refills: 0 | COMMUNITY
Start: 2018-03-07 | End: 2018-10-17

## 2018-03-23 NOTE — PROGRESS NOTES
Subjective:       Patient ID: Roman Quinn is a 32 y.o. male.    Chief Complaint: Annual Exam    HPI 32-year-old white male presents to clinic today for annual physical exam.  He continues to have well-controlled hypertension on Sarah 5/20 mg daily.  Hyperlipidemia remains well controlled on simvastatin 20 mg daily.  He has intermittent asthma which is stable with as needed albuterol.  He has a past surgical history of hernia repair.  He has a family history of his mother having diabetes.  He is up-to-date with all vaccinations.    Review of Systems   Constitutional: Positive for unexpected weight change. Negative for activity change, appetite change, chills, fatigue and fever.   HENT: Negative for congestion, ear pain, hearing loss, postnasal drip, rhinorrhea, sinus pressure, sore throat, tinnitus and trouble swallowing.    Eyes: Negative for discharge, redness, itching and visual disturbance.   Respiratory: Negative for cough, chest tightness, shortness of breath and wheezing.    Cardiovascular: Negative for chest pain and palpitations.   Gastrointestinal: Negative for abdominal pain, blood in stool, constipation, diarrhea, nausea and vomiting.   Endocrine: Negative for polydipsia and polyuria.   Genitourinary: Negative for decreased urine volume, difficulty urinating, dysuria, frequency, hematuria and urgency.   Musculoskeletal: Negative for arthralgias, back pain, joint swelling, myalgias, neck pain and neck stiffness.   Skin: Negative for rash.   Neurological: Negative for dizziness, weakness, light-headedness and headaches.   Psychiatric/Behavioral: Negative.  Negative for confusion and dysphoric mood.       Objective:      Physical Exam   Constitutional: He is oriented to person, place, and time. He appears well-developed and well-nourished. No distress.   HENT:   Head: Normocephalic and atraumatic.   Right Ear: External ear normal.   Left Ear: External ear normal.   Nose: Nose normal.   Mouth/Throat:  Oropharynx is clear and moist. No oropharyngeal exudate.   Eyes: Conjunctivae and EOM are normal. Pupils are equal, round, and reactive to light. Right eye exhibits no discharge. Left eye exhibits no discharge. No scleral icterus.   Neck: Normal range of motion. Neck supple. No JVD present. No tracheal deviation present. No thyromegaly present.   Cardiovascular: Normal rate, regular rhythm, normal heart sounds and intact distal pulses.  Exam reveals no gallop and no friction rub.    No murmur heard.  Pulmonary/Chest: Effort normal and breath sounds normal. No stridor. No respiratory distress. He has no wheezes. He has no rales.   Abdominal: Soft. Bowel sounds are normal. He exhibits no distension and no mass. There is no tenderness. There is no rebound and no guarding.   Musculoskeletal: Normal range of motion. He exhibits no edema or tenderness.   Lymphadenopathy:     He has no cervical adenopathy.   Neurological: He is alert and oriented to person, place, and time.   Skin: Skin is warm and dry. No rash noted. He is not diaphoretic. No erythema. No pallor.   Psychiatric: He has a normal mood and affect. His behavior is normal. Judgment and thought content normal.   Nursing note and vitals reviewed.      Assessment:       1. Well adult exam    2. Essential hypertension    3. Hyperlipidemia, unspecified hyperlipidemia type    4. Impaired fasting glucose    5. Mild intermittent asthma without complication    6. Obesity, unspecified classification, unspecified obesity type, unspecified whether serious comorbidity present        Plan:         1.  Labs have been reviewed and are overall within normal limits.  2.  Continue Sarah 5/20 mg daily.  Hypertension is well controlled.  3.  Continue simvastatin 20 mg daily.  Hyperlipidemia is well controlled.  4.  Continue diet and exercise.  5.  Continue albuterol as needed.  6.  Return to clinic as needed or in one year for annual exam.

## 2018-03-30 ENCOUNTER — PATIENT MESSAGE (OUTPATIENT)
Dept: INTERNAL MEDICINE | Facility: CLINIC | Age: 33
End: 2018-03-30

## 2018-04-02 ENCOUNTER — PATIENT MESSAGE (OUTPATIENT)
Dept: INTERNAL MEDICINE | Facility: CLINIC | Age: 33
End: 2018-04-02

## 2018-06-17 RX ORDER — ALBUTEROL SULFATE 90 UG/1
AEROSOL, METERED RESPIRATORY (INHALATION)
Qty: 8.5 INHALER | Refills: 10 | Status: ON HOLD | OUTPATIENT
Start: 2018-06-17 | End: 2018-10-18 | Stop reason: SDUPTHER

## 2018-10-09 ENCOUNTER — PATIENT MESSAGE (OUTPATIENT)
Dept: INTERNAL MEDICINE | Facility: CLINIC | Age: 33
End: 2018-10-09

## 2018-10-16 ENCOUNTER — OFFICE VISIT (OUTPATIENT)
Dept: URGENT CARE | Facility: CLINIC | Age: 33
End: 2018-10-16
Payer: COMMERCIAL

## 2018-10-16 VITALS
HEIGHT: 72 IN | DIASTOLIC BLOOD PRESSURE: 80 MMHG | OXYGEN SATURATION: 95 % | BODY MASS INDEX: 31.15 KG/M2 | TEMPERATURE: 99 F | WEIGHT: 230 LBS | SYSTOLIC BLOOD PRESSURE: 128 MMHG | RESPIRATION RATE: 20 BRPM | HEART RATE: 135 BPM

## 2018-10-16 DIAGNOSIS — J45.901 EXACERBATION OF ASTHMA, UNSPECIFIED ASTHMA SEVERITY, UNSPECIFIED WHETHER PERSISTENT: Primary | ICD-10-CM

## 2018-10-16 PROCEDURE — 94640 AIRWAY INHALATION TREATMENT: CPT | Mod: 59,S$GLB,, | Performed by: FAMILY MEDICINE

## 2018-10-16 PROCEDURE — 3074F SYST BP LT 130 MM HG: CPT | Mod: CPTII,S$GLB,, | Performed by: FAMILY MEDICINE

## 2018-10-16 PROCEDURE — 3079F DIAST BP 80-89 MM HG: CPT | Mod: CPTII,S$GLB,, | Performed by: FAMILY MEDICINE

## 2018-10-16 PROCEDURE — 99214 OFFICE O/P EST MOD 30 MIN: CPT | Mod: 25,S$GLB,, | Performed by: FAMILY MEDICINE

## 2018-10-16 PROCEDURE — 96372 THER/PROPH/DIAG INJ SC/IM: CPT | Mod: 59,S$GLB,, | Performed by: FAMILY MEDICINE

## 2018-10-16 RX ORDER — IPRATROPIUM BROMIDE 0.5 MG/2.5ML
0.5 SOLUTION RESPIRATORY (INHALATION)
Status: DISCONTINUED | OUTPATIENT
Start: 2018-10-16 | End: 2018-10-16 | Stop reason: HOSPADM

## 2018-10-16 RX ORDER — ALBUTEROL SULFATE 0.83 MG/ML
2.5 SOLUTION RESPIRATORY (INHALATION)
Status: DISCONTINUED | OUTPATIENT
Start: 2018-10-16 | End: 2018-10-16 | Stop reason: HOSPADM

## 2018-10-16 RX ORDER — IPRATROPIUM BROMIDE 0.5 MG/2.5ML
0.5 SOLUTION RESPIRATORY (INHALATION)
Status: CANCELLED | OUTPATIENT
Start: 2018-10-16 | End: 2018-10-16

## 2018-10-16 RX ORDER — METHYLPREDNISOLONE 4 MG/1
TABLET ORAL
Qty: 1 PACKAGE | Refills: 0 | Status: ON HOLD | OUTPATIENT
Start: 2018-10-16 | End: 2018-10-18 | Stop reason: HOSPADM

## 2018-10-16 RX ADMIN — IPRATROPIUM BROMIDE 0.5 MG: 0.5 SOLUTION RESPIRATORY (INHALATION) at 08:10

## 2018-10-16 RX ADMIN — ALBUTEROL SULFATE 2.5 MG: 0.83 SOLUTION RESPIRATORY (INHALATION) at 08:10

## 2018-10-16 RX ADMIN — ALBUTEROL SULFATE 2.5 MG: 0.83 SOLUTION RESPIRATORY (INHALATION) at 09:10

## 2018-10-17 ENCOUNTER — HOSPITAL ENCOUNTER (INPATIENT)
Facility: OTHER | Age: 33
LOS: 1 days | Discharge: HOME | DRG: 200 | End: 2018-10-18
Attending: EMERGENCY MEDICINE | Admitting: INTERNAL MEDICINE
Payer: COMMERCIAL

## 2018-10-17 ENCOUNTER — HOSPITAL ENCOUNTER (OUTPATIENT)
Dept: RADIOLOGY | Facility: HOSPITAL | Age: 33
Discharge: HOME | DRG: 200 | End: 2018-10-17
Attending: FAMILY MEDICINE
Payer: COMMERCIAL

## 2018-10-17 ENCOUNTER — PATIENT MESSAGE (OUTPATIENT)
Dept: INTERNAL MEDICINE | Facility: CLINIC | Age: 33
End: 2018-10-17

## 2018-10-17 ENCOUNTER — OFFICE VISIT (OUTPATIENT)
Dept: INTERNAL MEDICINE | Facility: CLINIC | Age: 33
DRG: 200 | End: 2018-10-17
Payer: COMMERCIAL

## 2018-10-17 VITALS
TEMPERATURE: 99 F | SYSTOLIC BLOOD PRESSURE: 114 MMHG | BODY MASS INDEX: 32.92 KG/M2 | DIASTOLIC BLOOD PRESSURE: 60 MMHG | OXYGEN SATURATION: 91 % | HEART RATE: 124 BPM | WEIGHT: 242.75 LBS | RESPIRATION RATE: 18 BRPM

## 2018-10-17 DIAGNOSIS — R00.0 TACHYCARDIA: ICD-10-CM

## 2018-10-17 DIAGNOSIS — R06.00 DYSPNEA, UNSPECIFIED TYPE: ICD-10-CM

## 2018-10-17 DIAGNOSIS — J18.9 PNEUMONIA OF LEFT LUNG DUE TO INFECTIOUS ORGANISM, UNSPECIFIED PART OF LUNG: Primary | ICD-10-CM

## 2018-10-17 DIAGNOSIS — J18.9 PNEUMONIA OF LEFT LUNG DUE TO INFECTIOUS ORGANISM, UNSPECIFIED PART OF LUNG: ICD-10-CM

## 2018-10-17 DIAGNOSIS — J98.2 PNEUMOMEDIASTINUM: Primary | ICD-10-CM

## 2018-10-17 DIAGNOSIS — J45.909 ASTHMA, UNSPECIFIED ASTHMA SEVERITY, UNSPECIFIED WHETHER COMPLICATED, UNSPECIFIED WHETHER PERSISTENT: ICD-10-CM

## 2018-10-17 PROBLEM — T79.7XXA SUBCUTANEOUS EMPHYSEMA: Status: ACTIVE | Noted: 2018-10-17

## 2018-10-17 LAB
ANION GAP SERPL CALC-SCNC: 9 MMOL/L
BASOPHILS # BLD AUTO: 0.01 K/UL
BASOPHILS NFR BLD: 0.1 %
BUN SERPL-MCNC: 17 MG/DL
CALCIUM SERPL-MCNC: 10 MG/DL
CHLORIDE SERPL-SCNC: 104 MMOL/L
CO2 SERPL-SCNC: 27 MMOL/L
CREAT SERPL-MCNC: 0.9 MG/DL
DIFFERENTIAL METHOD: ABNORMAL
EOSINOPHIL # BLD AUTO: 0 K/UL
EOSINOPHIL NFR BLD: 0 %
ERYTHROCYTE [DISTWIDTH] IN BLOOD BY AUTOMATED COUNT: 13 %
EST. GFR  (AFRICAN AMERICAN): >60 ML/MIN/1.73 M^2
EST. GFR  (NON AFRICAN AMERICAN): >60 ML/MIN/1.73 M^2
GLUCOSE SERPL-MCNC: 136 MG/DL
HCT VFR BLD AUTO: 46.2 %
HGB BLD-MCNC: 15.4 G/DL
LYMPHOCYTES # BLD AUTO: 0.8 K/UL
LYMPHOCYTES NFR BLD: 6.7 %
MCH RBC QN AUTO: 29.8 PG
MCHC RBC AUTO-ENTMCNC: 33.3 G/DL
MCV RBC AUTO: 89 FL
MONOCYTES # BLD AUTO: 0.3 K/UL
MONOCYTES NFR BLD: 2.5 %
NEUTROPHILS # BLD AUTO: 10.6 K/UL
NEUTROPHILS NFR BLD: 90.5 %
PLATELET # BLD AUTO: 287 K/UL
PMV BLD AUTO: 9.9 FL
POTASSIUM SERPL-SCNC: 4.8 MMOL/L
RBC # BLD AUTO: 5.17 M/UL
SODIUM SERPL-SCNC: 140 MMOL/L
WBC # BLD AUTO: 11.68 K/UL

## 2018-10-17 PROCEDURE — 3078F DIAST BP <80 MM HG: CPT | Mod: CPTII,S$GLB,, | Performed by: FAMILY MEDICINE

## 2018-10-17 PROCEDURE — 25000242 PHARM REV CODE 250 ALT 637 W/ HCPCS: Performed by: EMERGENCY MEDICINE

## 2018-10-17 PROCEDURE — 93005 ELECTROCARDIOGRAM TRACING: CPT

## 2018-10-17 PROCEDURE — 71046 X-RAY EXAM CHEST 2 VIEWS: CPT | Mod: TC,PO

## 2018-10-17 PROCEDURE — 3008F BODY MASS INDEX DOCD: CPT | Mod: CPTII,S$GLB,, | Performed by: FAMILY MEDICINE

## 2018-10-17 PROCEDURE — 27100098 HC SPACER

## 2018-10-17 PROCEDURE — 94761 N-INVAS EAR/PLS OXIMETRY MLT: CPT

## 2018-10-17 PROCEDURE — 63600175 PHARM REV CODE 636 W HCPCS: Performed by: INTERNAL MEDICINE

## 2018-10-17 PROCEDURE — 80048 BASIC METABOLIC PNL TOTAL CA: CPT

## 2018-10-17 PROCEDURE — 93010 ELECTROCARDIOGRAM REPORT: CPT | Mod: ,,, | Performed by: INTERNAL MEDICINE

## 2018-10-17 PROCEDURE — 85025 COMPLETE CBC W/AUTO DIFF WBC: CPT

## 2018-10-17 PROCEDURE — 3074F SYST BP LT 130 MM HG: CPT | Mod: CPTII,S$GLB,, | Performed by: FAMILY MEDICINE

## 2018-10-17 PROCEDURE — 99900035 HC TECH TIME PER 15 MIN (STAT)

## 2018-10-17 PROCEDURE — 25000242 PHARM REV CODE 250 ALT 637 W/ HCPCS: Performed by: INTERNAL MEDICINE

## 2018-10-17 PROCEDURE — 71046 X-RAY EXAM CHEST 2 VIEWS: CPT | Mod: 26,,, | Performed by: INTERNAL MEDICINE

## 2018-10-17 PROCEDURE — 99223 1ST HOSP IP/OBS HIGH 75: CPT | Mod: ,,, | Performed by: INTERNAL MEDICINE

## 2018-10-17 PROCEDURE — 25000003 PHARM REV CODE 250: Performed by: EMERGENCY MEDICINE

## 2018-10-17 PROCEDURE — 99999 PR PBB SHADOW E&M-EST. PATIENT-LVL IV: CPT | Mod: PBBFAC,,, | Performed by: FAMILY MEDICINE

## 2018-10-17 PROCEDURE — 11000001 HC ACUTE MED/SURG PRIVATE ROOM

## 2018-10-17 PROCEDURE — 25000003 PHARM REV CODE 250: Performed by: INTERNAL MEDICINE

## 2018-10-17 PROCEDURE — 99285 EMERGENCY DEPT VISIT HI MDM: CPT | Mod: 25

## 2018-10-17 PROCEDURE — 94640 AIRWAY INHALATION TREATMENT: CPT

## 2018-10-17 PROCEDURE — 96361 HYDRATE IV INFUSION ADD-ON: CPT

## 2018-10-17 PROCEDURE — 27000221 HC OXYGEN, UP TO 24 HOURS

## 2018-10-17 PROCEDURE — 96360 HYDRATION IV INFUSION INIT: CPT

## 2018-10-17 PROCEDURE — 99214 OFFICE O/P EST MOD 30 MIN: CPT | Mod: S$GLB,,, | Performed by: FAMILY MEDICINE

## 2018-10-17 RX ORDER — IPRATROPIUM BROMIDE AND ALBUTEROL SULFATE 2.5; .5 MG/3ML; MG/3ML
3 SOLUTION RESPIRATORY (INHALATION) EVERY 4 HOURS
Status: DISCONTINUED | OUTPATIENT
Start: 2018-10-17 | End: 2018-10-18

## 2018-10-17 RX ORDER — FLUTICASONE PROPIONATE 50 MCG
2 SPRAY, SUSPENSION (ML) NASAL DAILY
Qty: 16 G | Refills: 11 | Status: SHIPPED | OUTPATIENT
Start: 2018-10-17 | End: 2019-09-11 | Stop reason: SDUPTHER

## 2018-10-17 RX ORDER — OLMESARTAN MEDOXOMIL 20 MG/1
20 TABLET ORAL DAILY
Status: DISCONTINUED | OUTPATIENT
Start: 2018-10-18 | End: 2018-10-18 | Stop reason: HOSPADM

## 2018-10-17 RX ORDER — FLUTICASONE PROPIONATE 50 MCG
2 SPRAY, SUSPENSION (ML) NASAL DAILY
Status: DISCONTINUED | OUTPATIENT
Start: 2018-10-18 | End: 2018-10-18 | Stop reason: HOSPADM

## 2018-10-17 RX ORDER — FLUTICASONE FUROATE AND VILANTEROL 100; 25 UG/1; UG/1
1 POWDER RESPIRATORY (INHALATION) DAILY
Qty: 60 EACH | Refills: 11 | Status: SHIPPED | OUTPATIENT
Start: 2018-10-17 | End: 2018-10-17

## 2018-10-17 RX ORDER — SIMVASTATIN 10 MG/1
20 TABLET, FILM COATED ORAL NIGHTLY
Status: DISCONTINUED | OUTPATIENT
Start: 2018-10-17 | End: 2018-10-18 | Stop reason: HOSPADM

## 2018-10-17 RX ORDER — IPRATROPIUM BROMIDE AND ALBUTEROL SULFATE 2.5; .5 MG/3ML; MG/3ML
3 SOLUTION RESPIRATORY (INHALATION)
Status: COMPLETED | OUTPATIENT
Start: 2018-10-17 | End: 2018-10-17

## 2018-10-17 RX ORDER — LEVOFLOXACIN 750 MG/1
750 TABLET ORAL DAILY
Qty: 10 TABLET | Refills: 0 | Status: ON HOLD | OUTPATIENT
Start: 2018-10-17 | End: 2018-10-18 | Stop reason: HOSPADM

## 2018-10-17 RX ORDER — SODIUM CHLORIDE 0.9 % (FLUSH) 0.9 %
5 SYRINGE (ML) INJECTION
Status: DISCONTINUED | OUTPATIENT
Start: 2018-10-17 | End: 2018-10-18 | Stop reason: HOSPADM

## 2018-10-17 RX ORDER — METHYLPREDNISOLONE SOD SUCC 125 MG
60 VIAL (EA) INJECTION ONCE
Status: DISCONTINUED | OUTPATIENT
Start: 2018-10-17 | End: 2018-10-17

## 2018-10-17 RX ORDER — BENZONATATE 200 MG/1
200 CAPSULE ORAL 3 TIMES DAILY PRN
Qty: 30 CAPSULE | Refills: 0 | Status: ON HOLD | OUTPATIENT
Start: 2018-10-17 | End: 2018-10-18 | Stop reason: HOSPADM

## 2018-10-17 RX ORDER — HEPARIN SODIUM 5000 [USP'U]/ML
5000 INJECTION, SOLUTION INTRAVENOUS; SUBCUTANEOUS EVERY 8 HOURS
Status: DISCONTINUED | OUTPATIENT
Start: 2018-10-17 | End: 2018-10-17

## 2018-10-17 RX ORDER — AMLODIPINE BESYLATE 5 MG/1
5 TABLET ORAL DAILY
Status: DISCONTINUED | OUTPATIENT
Start: 2018-10-18 | End: 2018-10-18 | Stop reason: HOSPADM

## 2018-10-17 RX ORDER — FLUTICASONE PROPIONATE 220 UG/1
1 AEROSOL, METERED RESPIRATORY (INHALATION) EVERY 12 HOURS
Status: DISCONTINUED | OUTPATIENT
Start: 2018-10-17 | End: 2018-10-18 | Stop reason: HOSPADM

## 2018-10-17 RX ORDER — ACETAMINOPHEN 325 MG/1
650 TABLET ORAL EVERY 8 HOURS PRN
Status: DISCONTINUED | OUTPATIENT
Start: 2018-10-17 | End: 2018-10-18 | Stop reason: HOSPADM

## 2018-10-17 RX ORDER — ENOXAPARIN SODIUM 100 MG/ML
40 INJECTION SUBCUTANEOUS EVERY 24 HOURS
Status: DISCONTINUED | OUTPATIENT
Start: 2018-10-18 | End: 2018-10-18 | Stop reason: HOSPADM

## 2018-10-17 RX ADMIN — METHYLPREDNISOLONE SODIUM SUCCINATE 40 MG: 40 INJECTION, POWDER, FOR SOLUTION INTRAMUSCULAR; INTRAVENOUS at 07:10

## 2018-10-17 RX ADMIN — IPRATROPIUM BROMIDE AND ALBUTEROL SULFATE 3 ML: .5; 3 SOLUTION RESPIRATORY (INHALATION) at 11:10

## 2018-10-17 RX ADMIN — SIMVASTATIN 20 MG: 10 TABLET, FILM COATED ORAL at 09:10

## 2018-10-17 RX ADMIN — IPRATROPIUM BROMIDE AND ALBUTEROL SULFATE 3 ML: .5; 3 SOLUTION RESPIRATORY (INHALATION) at 07:10

## 2018-10-17 RX ADMIN — IPRATROPIUM BROMIDE AND ALBUTEROL SULFATE 3 ML: .5; 3 SOLUTION RESPIRATORY (INHALATION) at 04:10

## 2018-10-17 RX ADMIN — SODIUM CHLORIDE 1000 ML: 0.9 INJECTION, SOLUTION INTRAVENOUS at 10:10

## 2018-10-17 RX ADMIN — FLUTICASONE PROPIONATE 1 PUFF: 220 AEROSOL, METERED RESPIRATORY (INHALATION) at 04:10

## 2018-10-17 NOTE — H&P
[facility] Medicine  History & Physical    Patient Name: Roman Quinn  MRN: [id number]  Admission Date: 10/17/2018  Attending Physician: JESUS Marcano MD   Primary Care Provider: Hung Reid MD    Patient information was obtained from patient, spouse/SO, past medical records and ER records.     Subjective:     Principal Problem:Pneumomediastinum    Chief Complaint:   Chief Complaint   Patient presents with    Shortness of Breath     sent from clinic for abnormal CXR, suspected pneumonia with pneumomediastinum        HPI: Mr. Quinn is a 33/M with PMH asthma, HTN, HLD who presented to ED 10/17 after being referred by his PCP with abnormal CXR findings concerning for pneumomediastinum. His symptoms began approximately 2 days prior to admission with increased cough and SOB; he notes that he frequently has issues with his asthma with weather changes. Starting the Monday prior to admission he began having to use his inhaler multiple times throughout the day; he reports usually using his inhaler daily for symptom control. He also noted productive cough. With persistence of his symptoms he presented to urgent care on 10/16 in the evening and received albuterol-ipratropium inhalation x 2 as well as methylprednisolone 125mg IM. He presented to his PCP in the morning of 10/17 for further evaluation and with concern for PNA was prescribed levofloxacin, fluticasone-vilanterol and CXR was ordered; CXR demonstrated pneumomediastinum and it was recommended he present to the ED for further evaluation.    He denies fever, chills, CP, nausea/vomiting, abdominal pain or persistent retching. Complains of palpitations with nebulization treatments.    Past Medical History:   Diagnosis Date    Allergic conjunctivitis     Asthma     Hyperlipidemia     Hypertension      Past Surgical History:   Procedure Laterality Date    HERNIA REPAIR       Review of patient's allergies indicates:   Allergen  Reactions    Iodine and iodide containing products Anaphylaxis     Throat closing & itching.       No current facility-administered medications on file prior to encounter.      Current Outpatient Medications on File Prior to Encounter   Medication Sig    amlodipine-olmesartan (HANG) 5-20 mg per tablet Take 1 tablet by mouth once daily.    benzonatate (TESSALON) 200 MG capsule Take 1 capsule (200 mg total) by mouth 3 (three) times daily as needed for Cough.    dextromethorphan-guaifenesin  mg (MUCINEX DM)  mg per 12 hr tablet Take 1 tablet by mouth every 12 (twelve) hours.    fluticasone (FLONASE) 50 mcg/actuation nasal spray 2 sprays (100 mcg total) by Each Nare route once daily.    levoFLOXacin (LEVAQUIN) 750 MG tablet Take 1 tablet (750 mg total) by mouth once daily. for 10 days    methylPREDNISolone (MEDROL DOSEPACK) 4 mg tablet use as directed    PROAIR HFA 90 mcg/actuation inhaler INHALE 2 PUFFS BY MOUTH EVERY 6 HOURS AS NEEDED FOR SHORTNESS OF BREATH AND WHEEZING    simvastatin (ZOCOR) 20 MG tablet Take 1 tablet (20 mg total) by mouth every evening.    [DISCONTINUED] ADACEL,TDAP ADOLESN/ADULT,,PF, 2 Lf-(2.5-5-3-5 mcg)-5Lf/0.5 mL Syrg TO BE ADMINISTERED BY PHARMACIST FOR IMMUNIZATION    [DISCONTINUED] desoximetasone (TOPICORT) 0.25 % cream Apply topically 2 (two) times daily.    [DISCONTINUED] fluticasone-vilanterol (BREO) 100-25 mcg/dose diskus inhaler Inhale 1 puff into the lungs once daily. Controller     Family History     Problem Relation (Age of Onset)    Cancer Maternal Grandmother, Paternal Grandmother    Diabetes Mother    No Known Problems Father, Sister        Tobacco Use    Smoking status: Never Smoker    Smokeless tobacco: Never Used   Substance and Sexual Activity    Alcohol use: Yes     Comment: 10 drinks per week. Wine, Beer or mixed drinks.    Drug use: No    Sexual activity: Yes     Partners: Female     Review of Systems   Constitutional: Negative for chills and  fever.   HENT: Positive for rhinorrhea. Negative for trouble swallowing.    Eyes: Negative for pain and visual disturbance.   Respiratory: Positive for cough, chest tightness and shortness of breath.    Cardiovascular: Positive for palpitations. Negative for chest pain.   Gastrointestinal: Negative for abdominal pain, nausea and vomiting.   Genitourinary: Negative for difficulty urinating and dysuria.   Musculoskeletal: Negative for arthralgias and joint swelling.   Skin: Negative for rash and wound.   Neurological: Negative for weakness and numbness.     Objective:     Vital Signs (Most Recent):  Temp: 98.6 °F (37 °C) (10/17/18 1010)  Pulse: (!) 118 (10/17/18 1403)  Resp: 16 (10/17/18 1403)  BP: (!) 145/75 (10/17/18 1335)  SpO2: 96 % (10/17/18 1403) Vital Signs (24h Range):  Temp:  [98.6 °F (37 °C)-99 °F (37.2 °C)] 98.6 °F (37 °C)  Pulse:  [] 118  Resp:  [13-22] 16  SpO2:  [90 %-97 %] 96 %  BP: (114-157)/() 145/75        There is no height or weight on file to calculate BMI.    Physical Exam   Constitutional: He is oriented to person, place, and time. He appears well-developed. No distress.   HENT:   Head: Normocephalic and atraumatic.   Eyes: Conjunctivae and EOM are normal.   Cardiovascular: Normal rate, regular rhythm, S1 normal, S2 normal and intact distal pulses.   Pulmonary/Chest: Effort normal.   Tight with wheezes in all fields. Speaking in full sentences.   Abdominal: Soft. He exhibits no distension. There is no tenderness.   Musculoskeletal: Normal range of motion. He exhibits no edema.   Neurological: He is alert and oriented to person, place, and time.   Skin: Skin is warm and dry.   Nursing note and vitals reviewed.    Significant Labs:   CBC:  Recent Labs   Lab  10/17/18   1057   WBC  11.68   HGB  15.4   HCT  46.2   PLT  287   GRAN  90.5*  10.6*   LYMPH  6.7*  0.8*   MONO  2.5*  0.3   EOS  0.0   BASO  0.01     BMP:  Recent Labs   Lab  10/17/18   1057   NA  140   K  4.8   CL  104   CO2   27   BUN  17   CREATININE  0.9   GLU  136*   CALCIUM  10.0      Significant Imaging:   CXR 10/17/18:  Small region of consolidation at L lung base, but also mediastinum.    CT Chest WO Contrast 10/17/18:  Extensive pneumomediastinum extending into neck base. No pneumothorax or fluid collections. No lung consolidation.    Esophagram Complete 10/17/18:  No evidence of esophageal perforation.    Assessment/Plan:     * Pneumomediastinum    - Suspect increased pressures with asthma resulted in spontaneous pneumomediastinum in the absence of nausea, vomiting, persistent retching, pleural fluid collections that could represent gastrointestinal fluid on imaging.  - Esophagram demonstrates no evidence of perforation.  - Treat underlying cause with asthma exacerbation; patient previously using albuterol alone, but given spontaneous pneumomediastinum and reported history of frequent inhaler use would treat as more moderate-persistent than mild-intermittent.  - Will start albuterol-ipratropium 2.5-0.5mg inhaled q4hr, fluticasone 220mcg inhaled q12hr, and give methylprednisolone 60mg IV once.  - Goal SpO2 >92%.  - Pulmonology consulted for further management recommendations given pneumomediastinum.  - Avoid any interventions that would increase intrathoracic pressures (e.g. spirometry).        Subcutaneous emphysema    - As under pneumomediastinum.        Moderate persistent asthma with acute exacerbation    - As under pneumomediastinum.        Hyperlipidemia    - Continue simvastatin 20mg PO qHS.        Essential hypertension    - Continue amlodipine 5mg PO daily, olmesartan 20mg PO daily.          VTE Risk Mitigation (From admission, onward)        Ordered     enoxaparin injection 40 mg  Daily      10/17/18 1524     Place sequential compression device  Until discontinued      10/17/18 1440     IP VTE HIGH RISK PATIENT  Once      10/17/18 1440        JESUS Marx MD  Department of Hospital Medicine   [facility]  [phone]

## 2018-10-17 NOTE — PLAN OF CARE
Problem: Patient Care Overview  Goal: Plan of Care Review  Outcome: Ongoing (interventions implemented as appropriate)  Pt received on NC 2L with adequate saturation. Duoneb treatment and Floven inhaler initiated, tolerated well.

## 2018-10-17 NOTE — PROGRESS NOTES
Subjective:       Patient ID: Roman Quinn is a 33 y.o. male.    Chief Complaint: Asthma    HPI 33-year-old white male with intermittent asthma presents to clinic today secondary to a complaint of worsening shortness of breath, wheezing, chest congestion, and runny nose for the past 2 days.  He reports that yesterday he had used his albuterol rescue inhaler approximately every 2 hr without relief.  He has also been using Mucinex and took Singulair mid day yesterday.  He reports worsening symptoms later that day and by approximately 7:00 p.m. last night he states worsening shortness of breath so he presented to urgent care.  At urgent care he was diagnosed with asthma exacerbation and given 3 albuterol nebulizer treatments and Solu-Medrol at the urgent care.  Urgent care note reports stability of symptoms and the patient was sent home for follow-up today.  He reports use of albuterol this morning without relief and presents for re-evaluation.  Review of Systems   Constitutional: Negative for appetite change, chills, fatigue and fever.   HENT: Positive for rhinorrhea. Negative for congestion, ear pain, hearing loss, postnasal drip, sinus pressure, sore throat and tinnitus.    Eyes: Negative for redness, itching and visual disturbance.   Respiratory: Positive for chest tightness, shortness of breath and wheezing. Negative for cough.    Cardiovascular: Negative for chest pain, palpitations and leg swelling.   Gastrointestinal: Negative for abdominal pain, constipation, diarrhea, nausea and vomiting.   Genitourinary: Negative for decreased urine volume, difficulty urinating, dysuria, frequency, hematuria and urgency.   Musculoskeletal: Negative for back pain, myalgias, neck pain and neck stiffness.   Skin: Negative for rash.   Neurological: Negative for dizziness, light-headedness and headaches.   Psychiatric/Behavioral: Negative.        Objective:      Physical Exam   Constitutional: He is oriented to person,  place, and time. He appears well-developed and well-nourished. No distress.   HENT:   Head: Normocephalic and atraumatic.   Right Ear: External ear normal.   Left Ear: External ear normal.   Nose: Mucosal edema and rhinorrhea present.   Mouth/Throat: Oropharynx is clear and moist. No oropharyngeal exudate.   Eyes: Conjunctivae and EOM are normal. Pupils are equal, round, and reactive to light. Right eye exhibits no discharge. Left eye exhibits no discharge. No scleral icterus.   Neck: Normal range of motion. Neck supple. No JVD present. No tracheal deviation present. No thyromegaly present.   Cardiovascular: Normal rate, regular rhythm, normal heart sounds and intact distal pulses. Exam reveals no gallop and no friction rub.   No murmur heard.  Pulmonary/Chest: Effort normal. No stridor. No respiratory distress. He has decreased breath sounds. He has wheezes in the right upper field, the right middle field and the right lower field. He has no rales.   Abdominal: Soft. Bowel sounds are normal. He exhibits no distension and no mass. There is no tenderness. There is no rebound and no guarding.   Musculoskeletal: Normal range of motion. He exhibits no edema or tenderness.   Lymphadenopathy:     He has no cervical adenopathy.   Neurological: He is alert and oriented to person, place, and time.   Skin: Skin is warm and dry. No rash noted. He is not diaphoretic. No erythema. No pallor.   Psychiatric: He has a normal mood and affect. His behavior is normal. Judgment and thought content normal.   Nursing note and vitals reviewed.      Assessment:       1. Pneumonia of left lung due to infectious organism, unspecified part of lung        Plan:       Pneumonia of left lung due to infectious organism, unspecified part of lung  -     X-Ray Chest PA And Lateral; Future; Expected date: 10/17/2018  -     levoFLOXacin (LEVAQUIN) 750 MG tablet; Take 1 tablet (750 mg total) by mouth once daily. for 10 days  Dispense: 10 tablet;  Refill: 0  -     fluticasone-vilanterol (BREO) 100-25 mcg/dose diskus inhaler; Inhale 1 puff into the lungs once daily. Controller  Dispense: 60 each; Refill: 11  -     fluticasone (FLONASE) 50 mcg/actuation nasal spray; 2 sprays (100 mcg total) by Each Nare route once daily.  Dispense: 16 g; Refill: 11  -     benzonatate (TESSALON) 200 MG capsule; Take 1 capsule (200 mg total) by mouth 3 (three) times daily as needed for Cough.  Dispense: 30 capsule; Refill: 0      Start Medrol Dosepak as prescribed by Urgent Care.  Continue Singulair nightly.  Tylenol and ibuprofen as needed for fever or pain.  Return to clinic as needed if symptoms persist or worsen or proceed to the ER for worsening shortness of breath or fever.

## 2018-10-17 NOTE — ASSESSMENT & PLAN NOTE
Pneumomediastinum likely due to his acute exacerbation of asthma.  Based on his history and normal esophagram, it is unlikely that there is an esophageal perforation.  -Continue to monitor clinically. We discussed warning signs such as difficulty swallowing, difficulty speaking, increased shortness of breath that would be signs of concerning progression.  He is also a risk of pneumothorax.  -aggressive asthma management

## 2018-10-17 NOTE — PATIENT INSTRUCTIONS
PLEASE READ YOUR DISCHARGE INSTRUCTIONS ENTIRELY AS IT CONTAINS IMPORTANT INFORMATION.    Please go to the ER for any worsening condition before your appt    Use your albuterol every 4 hours    Take the steroids to completion    Please keep your appt tomorrow morning        You must understand that you have received an Urgent Care treatment only and that you may be released before all of your medical problems are known or treated.        Asthma (Adult)  Asthma is a disease where the medium and  small air passages within the lung go into spasm and restrict the flow of air. Inflammation and swelling of the airways cause further restriction. During an acute asthma attack, these factors cause difficulty breathing, wheezing, cough and chest tightness.    An asthma attack can be triggered by many things. Common triggers include infections such as the common cold, bronchitis, pneumonia. Irritants such as smoke or pollutants in the air, emotional upset, and exercise can also trigger an attack. In many adults with asthma, allergies to dust, mold, pollen and animal dander can cause an asthma attack. Skipping doses of daily asthma medicine can also bring on an asthma attack.  Asthma can be controlled using the proper medicines prescribed by your healthcare provider and avoiding exposure to known triggers including allergens and irritants.  Home care  · Take prescribed medicine exactly at the times advised. If you need medicine such as from a hand held inhaler or aerosol breathing machine more than every 4 hours, contact your healthcare provider or seek immediate medical attention. If prescribed an antibiotic or prednisone, take all of the medicine as prescribed, even if you are feeling better after a few days.  · Do not smoke. Avoid being exposed to the smoke of others.  · Some people with asthma have worsening of their symptoms when they take aspirin and non-steroidal or fever-reducing medicines like ibuprofen and naproxen.  "Talk to your healthcare provider if you think this may apply to you.  Follow-up care  Follow up with your healthcare provider, or as advised. Always bring all of your current medicines to any appointments with your healthcare provider. Also bring a complete list of medications even those not taken for asthma. If you do not already have one, talk to your healthcare provider about developing a personalized "Asthma Action Plan."  A pneumococcal (pneumonia) vaccine and yearly flu shot (every fall) are recommended. Ask your doctor about this.  When to seek medical advice  Call your healthcare provider right away if any of these occur:   · Increased wheezing or shortness of breath  · Need to use your inhalers more often than usual without relief  · Fever of 100.4ºF (38ºC) or higher, or as directed by your healthcare provider  · Coughing up lots of dark-colored or bloody sputum (mucus)  · Chest pain with each breath  · If you use a peak flow meter as part of an Asthma Action Plan, and you are still in the yellow zone (50% to 80%) 15 minutes after using inhaler medicine.  Call 911  Call 911 if any of the following occur  · Trouble walking or talking because of shortness of breath  · If you use a peak flow meter as part of an Asthma Action Plan and you are still in the red zone (less than 50%) 15 minutes after using inhaler medicine  · Lips or fingernails turning gray or blue  Date Last Reviewed: 12/2/2015  © 3519-8789 [employer] [address] [place] [address], [place] [address]. All rights reserved. This information is not intended as a substitute for professional medical care. Always follow your healthcare professional's instructions.        "

## 2018-10-17 NOTE — HPI
Mr. Quinn is a 33/M with PMH asthma, HTN, HLD who presented to ED 10/17 after being referred by his PCP with abnormal CXR findings concerning for pneumomediastinum. His symptoms began approximately 2 days prior to admission with increased cough and SOB; he notes that he frequently has issues with his asthma with weather changes. Starting the Monday prior to admission he began having to use his inhaler multiple times throughout the day; he reports usually using his inhaler daily for symptom control. He also noted productive cough. With persistence of his symptoms he presented to urgent care on 10/16 in the evening and received albuterol-ipratropium inhalation x 2 as well as methylprednisolone 125mg IM. He presented to his PCP in the morning of 10/17 for further evaluation and with concern for PNA was prescribed levofloxacin, fluticasone-vilanterol and CXR was ordered; CXR demonstrated pneumomediastinum and it was recommended he present to the ED for further evaluation.    He denies fever, chills, CP, nausea/vomiting, abdominal pain or persistent retching. Complains of palpitations with nebulization treatments.

## 2018-10-17 NOTE — SUBJECTIVE & OBJECTIVE
Past Medical History:   Diagnosis Date    Allergic conjunctivitis     Asthma     Hyperlipidemia     Hypertension        Past Surgical History:   Procedure Laterality Date    HERNIA REPAIR         Review of patient's allergies indicates:   Allergen Reactions    Iodine and iodide containing products Anaphylaxis     Throat closing & itching.       Family History     Problem Relation (Age of Onset)    Cancer Maternal Grandmother, Paternal Grandmother    Diabetes Mother    No Known Problems Father, Sister        Tobacco Use    Smoking status: Never Smoker    Smokeless tobacco: Never Used   Substance and Sexual Activity    Alcohol use: Yes     Comment: 10 drinks per week. Wine, Beer or mixed drinks.    Drug use: No    Sexual activity: Yes     Partners: Female         Review of Systems   Constitutional: Negative for appetite change, chills, fever and unexpected weight change.   HENT: Positive for congestion, sore throat and voice change. Negative for postnasal drip and trouble swallowing.    Respiratory: Positive for cough, shortness of breath and wheezing. Negative for choking.    Cardiovascular: Negative for chest pain and leg swelling.   Gastrointestinal: Negative for abdominal distention, abdominal pain, constipation, diarrhea, nausea and vomiting.        No heartburn   Genitourinary: Negative for difficulty urinating and hematuria.   Musculoskeletal: Negative for arthralgias and joint swelling.   Skin: Negative for rash.   Neurological: Negative for facial asymmetry and speech difficulty.     Objective:     Vital Signs (Most Recent):  Temp: 98.3 °F (36.8 °C) (10/17/18 1550)  Pulse: 106 (10/17/18 1638)  Resp: 20 (10/17/18 1638)  BP: 126/76 (10/17/18 1550)  SpO2: 96 % (10/17/18 1635) Vital Signs (24h Range):  Temp:  [98.3 °F (36.8 °C)-99 °F (37.2 °C)] 98.3 °F (36.8 °C)  Pulse:  [] 106  Resp:  [13-22] 20  SpO2:  [90 %-97 %] 96 %  BP: (114-157)/() 126/76        There is no height or weight on file  to calculate BMI.      Intake/Output Summary (Last 24 hours) at 10/17/2018 1807  Last data filed at 10/17/2018 1349  Gross per 24 hour   Intake 1000 ml   Output --   Net 1000 ml       Physical Exam   Constitutional: He appears well-developed and well-nourished. No distress.   HENT:   Head: Normocephalic and atraumatic.   Mouth/Throat: No oropharyngeal exudate.   Mallampati 2   Eyes: No scleral icterus.   Neck: Neck supple.   No crepitus or adenopathy. No tracheal shift.   Cardiovascular:   Minimally tachycardic regular rhythm, no murmurs   Pulmonary/Chest: Effort normal. No stridor. No respiratory distress. He has no wheezes. He has no rales.   Equal breath sounds throughout.  No conversational dyspnea.   Abdominal: Soft. He exhibits no distension. There is no tenderness.   Musculoskeletal: He exhibits no edema or deformity.   Neurological: He is alert. He exhibits normal muscle tone.   Answering questions appropriately, face symmetric, moving all extremities.   Skin: Skin is warm and dry. No rash noted.   Psychiatric: He has a normal mood and affect. His behavior is normal.         Lines/Drains/Airways     Peripheral Intravenous Line                 Peripheral IV - Single Lumen 10/17/18 1057 Right Antecubital less than 1 day                Significant Labs:    CBC/Anemia Profile:  Recent Labs   Lab  10/17/18   1057   WBC  11.68   HGB  15.4   HCT  46.2   PLT  287   MCV  89   RDW  13.0        Chemistries:  Recent Labs   Lab  10/17/18   1057   NA  140   K  4.8   CL  104   CO2  27   BUN  17   CREATININE  0.9   CALCIUM  10.0       All pertinent labs within the past 24 hours have been reviewed.    Significant Imaging:   CT: I have reviewed all pertinent results/findings within the past 24 hours and my personal findings are:  Pneumomediastinum extending up into the neck, with air scattered in the prevertebral tissues, lateral thorax.  No pleural effusions.  CXR: I have reviewed all pertinent results/findings within the  past 24 hours and my personal findings are:  Pneumomediastinum.  No opacities identified.   Esophagram: no evidence of perforation or extravasation of contrast

## 2018-10-17 NOTE — HPI
Mr. Quinn is a 33-year-old gentleman with a history of asthma who presents due to an abnormal chest x-ray.  Three days ago he developed shortness of breath, wheezing, and upper respiratory symptoms related to his usual asthma exacerbation symptoms.  Due to progressive shortness of breath, he presented last night to urgent care where he was treated with several nebulizer treatments and had a chest x-ray performed.  The chest x-ray demonstrated pneumomediastinum.  He has not been coughing a significant amount, and he denies retching and vomiting. He reports about 1 exacerbation per year, commonly around the time of weather changes in the fall.  He is currently not on maintenance inhalers, but reports that at baseline he still uses his albuterol daily.  He has never been hospitalized or intubated for his asthma.  He is a never smoker.    He has noticed some voice change today and a mild sore throat, but denies chest pain, worsened shortness of breath, dysphagia, and odynophagia.

## 2018-10-17 NOTE — SUBJECTIVE & OBJECTIVE
Past Medical History:   Diagnosis Date    Allergic conjunctivitis     Asthma     Hyperlipidemia     Hypertension      Past Surgical History:   Procedure Laterality Date    HERNIA REPAIR       Review of patient's allergies indicates:   Allergen Reactions    Iodine and iodide containing products Anaphylaxis     Throat closing & itching.       No current facility-administered medications on file prior to encounter.      Current Outpatient Medications on File Prior to Encounter   Medication Sig    amlodipine-olmesartan (HANG) 5-20 mg per tablet Take 1 tablet by mouth once daily.    benzonatate (TESSALON) 200 MG capsule Take 1 capsule (200 mg total) by mouth 3 (three) times daily as needed for Cough.    dextromethorphan-guaifenesin  mg (MUCINEX DM)  mg per 12 hr tablet Take 1 tablet by mouth every 12 (twelve) hours.    fluticasone (FLONASE) 50 mcg/actuation nasal spray 2 sprays (100 mcg total) by Each Nare route once daily.    levoFLOXacin (LEVAQUIN) 750 MG tablet Take 1 tablet (750 mg total) by mouth once daily. for 10 days    methylPREDNISolone (MEDROL DOSEPACK) 4 mg tablet use as directed    PROAIR HFA 90 mcg/actuation inhaler INHALE 2 PUFFS BY MOUTH EVERY 6 HOURS AS NEEDED FOR SHORTNESS OF BREATH AND WHEEZING    simvastatin (ZOCOR) 20 MG tablet Take 1 tablet (20 mg total) by mouth every evening.    [DISCONTINUED] ADACEL,TDAP ADOLESN/ADULT,,PF, 2 Lf-(2.5-5-3-5 mcg)-5Lf/0.5 mL Syrg TO BE ADMINISTERED BY PHARMACIST FOR IMMUNIZATION    [DISCONTINUED] desoximetasone (TOPICORT) 0.25 % cream Apply topically 2 (two) times daily.    [DISCONTINUED] fluticasone-vilanterol (BREO) 100-25 mcg/dose diskus inhaler Inhale 1 puff into the lungs once daily. Controller     Family History     Problem Relation (Age of Onset)    Cancer Maternal Grandmother, Paternal Grandmother    Diabetes Mother    No Known Problems Father, Sister        Tobacco Use    Smoking status: Never Smoker    Smokeless tobacco: Never  Used   Substance and Sexual Activity    Alcohol use: Yes     Comment: 10 drinks per week. Wine, Beer or mixed drinks.    Drug use: No    Sexual activity: Yes     Partners: Female     Review of Systems   Constitutional: Negative for chills and fever.   HENT: Positive for rhinorrhea. Negative for trouble swallowing.    Eyes: Negative for pain and visual disturbance.   Respiratory: Positive for cough, chest tightness and shortness of breath.    Cardiovascular: Positive for palpitations. Negative for chest pain.   Gastrointestinal: Negative for abdominal pain, nausea and vomiting.   Genitourinary: Negative for difficulty urinating and dysuria.   Musculoskeletal: Negative for arthralgias and joint swelling.   Skin: Negative for rash and wound.   Neurological: Negative for weakness and numbness.     Objective:     Vital Signs (Most Recent):  Temp: 98.6 °F (37 °C) (10/17/18 1010)  Pulse: (!) 118 (10/17/18 1403)  Resp: 16 (10/17/18 1403)  BP: (!) 145/75 (10/17/18 1335)  SpO2: 96 % (10/17/18 1403) Vital Signs (24h Range):  Temp:  [98.6 °F (37 °C)-99 °F (37.2 °C)] 98.6 °F (37 °C)  Pulse:  [] 118  Resp:  [13-22] 16  SpO2:  [90 %-97 %] 96 %  BP: (114-157)/() 145/75        There is no height or weight on file to calculate BMI.    Physical Exam   Constitutional: He is oriented to person, place, and time. He appears well-developed. No distress.   HENT:   Head: Normocephalic and atraumatic.   Eyes: Conjunctivae and EOM are normal.   Cardiovascular: Normal rate, regular rhythm, S1 normal, S2 normal and intact distal pulses.   Pulmonary/Chest: Effort normal.   Tight with wheezes in all fields. Speaking in full sentences.   Abdominal: Soft. He exhibits no distension. There is no tenderness.   Musculoskeletal: Normal range of motion. He exhibits no edema.   Neurological: He is alert and oriented to person, place, and time.   Skin: Skin is warm and dry.   Nursing note and vitals reviewed.    Significant Labs:    CBC:  Recent Labs   Lab  10/17/18   1057   WBC  11.68   HGB  15.4   HCT  46.2   PLT  287   GRAN  90.5*  10.6*   LYMPH  6.7*  0.8*   MONO  2.5*  0.3   EOS  0.0   BASO  0.01     BMP:  Recent Labs   Lab  10/17/18   1057   NA  140   K  4.8   CL  104   CO2  27   BUN  17   CREATININE  0.9   GLU  136*   CALCIUM  10.0      Significant Imaging:   CXR 10/17/18:  Small region of consolidation at L lung base, but also mediastinum.    CT Chest WO Contrast 10/17/18:  Extensive pneumomediastinum extending into neck base. No pneumothorax or fluid collections. No lung consolidation.    Esophagram Complete 10/17/18:  No evidence of esophageal perforation.

## 2018-10-17 NOTE — ASSESSMENT & PLAN NOTE
-agree with scheduled nebs  -adding Solu-Medrol Q6 hours  -agreeing with maintenance ICS for his suboptimally controlled asthma  -may benefit from a nasal steroid if he has upper respiratory symptoms

## 2018-10-17 NOTE — TELEPHONE ENCOUNTER
I have contacted the patient and informed him of his chest x-ray results.  I have recommended that the patient proceed to the emergency room further evaluation.  Report has been given to the emergency room doctor.  Patient expresses understanding and will proceed to [facility].

## 2018-10-17 NOTE — ED TRIAGE NOTES
"Pt Presents to ED with C/O productive cough and SOB since yesterday. Pt states " I went to urgent care yesterday because I was so short of breath". RN received call from MD. Reid who stated pt has history of asthma and was given 3 neb treatments and solumedrol, Pt had a chest CXR at urgent care, pt sent by MD to [facility] ER. Pt placed on 3L o2. Pt denies chest pain, N/V/D, dizziness, weakness. Pt AAO x 4, Pt appears calm and cooperative. Wife at bedside  "

## 2018-10-17 NOTE — ED PROVIDER NOTES
Encounter Date: 10/17/2018    SCRIBE #1 NOTE: Tessie NAVARRETE am scribing for, and in the presence of, Dr. Middleton.       History     Chief Complaint   Patient presents with    Shortness of Breath     sent from clinic for abnormal CXR, suspected pneumonia with pneumomediastinum     Time seen by provider: 10:28 AM    This is a 33 y.o. male with hx of HTN and asthma who presents with complaint of SOB for approximately 23 hours, which has been progressively worsening. He had no relief with his albuterol inhaler, leading to an Urgent Care visit yesterday evening. Sx were mitigated with three nebulizer treatments, though have persisted. He is still having SOB and difficulty with deep inspiration. He reports associated voice change. He also reports an intermittently productive cough for the past few days. He was seen by PCP, Dr. Reid, PTA and was sent for evaluation of possible PNA and pneumomediastinum. Pt denies any fever, chills, N/V, chest pain, leg swelling, palpitations, dizziness, weakness, and back pain.      The history is provided by the patient.     Review of patient's allergies indicates:   Allergen Reactions    Iodine and iodide containing products Anaphylaxis     Throat closing & itching.     Past Medical History:   Diagnosis Date    Allergic conjunctivitis     Asthma     Hyperlipidemia     Hypertension      Past Surgical History:   Procedure Laterality Date    HERNIA REPAIR       Family History   Problem Relation Age of Onset    Diabetes Mother     Cancer Maternal Grandmother     Cancer Paternal Grandmother     No Known Problems Father     No Known Problems Sister     Skin cancer Neg Hx     Melanoma Neg Hx     Allergic rhinitis Neg Hx     Asthma Neg Hx     Immunodeficiency Neg Hx     Rhinitis Neg Hx     Urticaria Neg Hx     Eczema Neg Hx     Atopy Neg Hx     Angioedema Neg Hx     Allergies Neg Hx      Social History     Tobacco Use    Smoking status: Never Smoker     Smokeless tobacco: Never Used   Substance Use Topics    Alcohol use: Yes     Comment: 10 drinks per week. Wine, Beer or mixed drinks.    Drug use: No     Review of Systems   Constitutional: Negative for chills and fever.   HENT: Negative for congestion, rhinorrhea and sore throat.    Respiratory: Positive for cough (productive) and shortness of breath.    Cardiovascular: Negative for chest pain.   Gastrointestinal: Negative for abdominal pain, diarrhea, nausea and vomiting.   Endocrine: Negative for polyuria.   Genitourinary: Negative for decreased urine volume and dysuria.   Musculoskeletal: Negative for back pain.   Skin: Negative for rash.   Allergic/Immunologic: Negative for immunocompromised state.   Neurological: Negative for dizziness, weakness and light-headedness.   Hematological: Does not bruise/bleed easily.   Psychiatric/Behavioral: Negative for confusion.       Physical Exam     Initial Vitals   BP Pulse Resp Temp SpO2   10/17/18 1011 10/17/18 1012 10/17/18 1035 10/17/18 1010 10/17/18 1010   131/84 (!) 122 19 98.6 °F (37 °C) (!) 92 %      MAP       --                Physical Exam    Nursing note and vitals reviewed.  Constitutional: He appears well-developed and well-nourished. No distress.   HENT:   Head: Normocephalic and atraumatic.   Eyes: Conjunctivae and EOM are normal. Pupils are equal, round, and reactive to light.   Neck: Normal range of motion. Neck supple.   Crepitance to neck. No stridor.   Cardiovascular: Regular rhythm and normal heart sounds. Tachycardia present.    Pulmonary/Chest: He is in respiratory distress. He has decreased breath sounds.   Mildly increased work of breathing. Diminished breath sounds to the anterior lung fields. Prolonged expirations with wheezing to bilateral bases.    Abdominal: Soft. Normal appearance and bowel sounds are normal. There is no tenderness.   Musculoskeletal: Normal range of motion.   Neurological: He is alert and oriented to person, place, and  time. He has normal strength. No cranial nerve deficit or sensory deficit.   Skin: Skin is warm and dry.   Psychiatric: He has a normal mood and affect. His behavior is normal. Thought content normal.         ED Course   Procedures  Labs Reviewed   BASIC METABOLIC PANEL - Abnormal; Notable for the following components:       Result Value    Glucose 136 (*)     All other components within normal limits   CBC W/ AUTO DIFFERENTIAL - Abnormal; Notable for the following components:    Gran # (ANC) 10.6 (*)     Lymph # 0.8 (*)     Gran% 90.5 (*)     Lymph% 6.7 (*)     Mono% 2.5 (*)     All other components within normal limits     EKG Readings: (Independently Interpreted)   Sinus tachycardia at a rate of 114. Normal axis. Narrow QRS. No STEMI.        Medical Decision Making:   Initial Assessment:   Urgent evaluation of 33-year-old gentleman with asthma sent from clinic with his PCP- Dr Reid in [place] given concern for SOB. Pt was seen at Urgent Care yesterday, sent home w Solumedrol, singulair and levaquin added today by Jeanette, sent for CXR and concering for pneumomediastinum. Here pt reports shortness of breath, denies chest pain, non smoker, no hx of ptx in past. Here pt tachycardic, normotensive, mild hypoxia. Will plan to admin supplemental oxygen, admin nebs, repeat imaging and reasses.   Independently Interpreted Test(s):   I have ordered and independently interpreted EKG Reading(s) - see prior notes  Clinical Tests:   Lab Tests: Ordered and Reviewed  Radiological Study: Ordered  Medical Tests: Ordered and Reviewed  ED Management:  11:56 AM - General surgery paged.  12:01 PM - Discussed pt with Dr. Brantley, who recommends discussion with CT surgery.   12:10 PM - Called transfer center.  1:12 PM - Discussed pt with CT surgery, ok to stay at [facility], recommend Fluoro esophagram  1:24 PM - Discussed pt with pulmonary critical care fellow, will consults, ok with management here at [facility].  1:28 PM - Paged  hospitalist.   1:31 PM - Discussed pt with Dr. Sharma, will admit to Dr. Sharma.  Other:   I have discussed this case with another health care provider.            Scribe Attestation:   Scribe #1: I performed the above scribed service and the documentation accurately describes the services I performed. I attest to the accuracy of the note.    Attending Attestation:           Physician Attestation for Scribe:  Physician Attestation Statement for Scribe #1: I, Dr. Middleton, reviewed documentation, as scribed by Tessie Noe in my presence, and it is both accurate and complete.                    Clinical Impression:     1. Pneumomediastinum    2. Tachycardia    3. Dyspnea, unspecified type    4. Asthma, unspecified asthma severity, unspecified whether complicated, unspecified whether persistent          Disposition:   Disposition: Admitted  Condition: Omkar Middleton MD  10/17/18 7233

## 2018-10-17 NOTE — ASSESSMENT & PLAN NOTE
- Suspect increased pressures with asthma resulted in spontaneous pneumomediastinum in the absence of nausea, vomiting, persistent retching, pleural fluid collections that could represent gastrointestinal fluid on imaging.  - Esophagram demonstrates no evidence of perforation.  - Treat underlying cause with asthma exacerbation; patient previously using albuterol alone, but given spontaneous pneumomediastinum and reported history of frequent inhaler use would treat as more moderate-persistent than mild-intermittent.  - Will start albuterol-ipratropium 2.5-0.5mg inhaled q4hr, fluticasone 220mcg inhaled q12hr, and give methylprednisolone 60mg IV once.  - Goal SpO2 >92%.  - Pulmonology consulted for further management recommendations given pneumomediastinum.  - Avoid any interventions that would increase intrathoracic pressures (e.g. spirometry).

## 2018-10-17 NOTE — PROGRESS NOTES
Subjective:       Patient ID: Roman Quinn is a 33 y.o. male.    Vitals:  height is 6' (1.829 m) and weight is 104.3 kg (230 lb). His tympanic temperature is 99 °F (37.2 °C). His blood pressure is 128/80 and his pulse is 135 (abnormal). His respiration is 20 and oxygen saturation is 95%.     Chief Complaint: Shortness of Breath    Patient presents with c/o sob x 9 hours. Shortness of breath started around 1130 am - used his albuterol inhaler several times today - does not have neb at home. Wife gives hx of being sick congestion and cough in the last few days. Patient with hx of asthma. No improvement with his prescribed inhaler. Presently, patient is wheezing and feels like he is unable to catch his breath. O2 = 90 %. Took mucinex yesterday and today. appt with pcp tomorrow morning at 8am      Shortness of Breath   This is a new problem. The current episode started today. The problem occurs every few minutes. The problem has been gradually worsening. Associated symptoms include sputum production and wheezing. Pertinent negatives include no abdominal pain, chest pain, ear pain, fever, headaches, leg swelling or sore throat. Treatments tried: Proair hfa. The treatment provided no relief. His past medical history is significant for asthma.     Review of Systems   Constitution: Negative for chills, fever and malaise/fatigue.   HENT: Positive for congestion. Negative for ear pain, hoarse voice and sore throat.    Eyes: Negative for discharge and redness.   Cardiovascular: Negative for chest pain, dyspnea on exertion and leg swelling.   Respiratory: Positive for cough, shortness of breath, sputum production and wheezing.         Chest congestion     Musculoskeletal: Negative for myalgias.   Gastrointestinal: Negative for abdominal pain and nausea.   Neurological: Negative for headaches.       Objective:      Physical Exam   Constitutional: He is oriented to person, place, and time. He appears well-developed and  well-nourished. He is cooperative.  Non-toxic appearance. He does not appear ill. He appears distressed (initially on exam before treatments).   HENT:   Head: Normocephalic and atraumatic.   Right Ear: Hearing, tympanic membrane, external ear and ear canal normal. No middle ear effusion.   Left Ear: Hearing, tympanic membrane, external ear and ear canal normal.  No middle ear effusion.   Nose: Mucosal edema present. No rhinorrhea or nasal deformity. No epistaxis. Right sinus exhibits no maxillary sinus tenderness and no frontal sinus tenderness. Left sinus exhibits no maxillary sinus tenderness and no frontal sinus tenderness.   Mouth/Throat: Uvula is midline, oropharynx is clear and moist and mucous membranes are normal. No trismus in the jaw. Normal dentition. No uvula swelling. No oropharyngeal exudate or posterior oropharyngeal erythema.   Eyes: Conjunctivae and lids are normal. No scleral icterus.   Sclera clear bilat   Neck: Trachea normal, full passive range of motion without pain and phonation normal. Neck supple.   Cardiovascular: Normal rate, regular rhythm, normal heart sounds, intact distal pulses and normal pulses.   Pulmonary/Chest: Effort normal. Tachypnea noted. No respiratory distress. He has wheezes.   Breath sounds in all fields  Post treatment assessment: still wheezing, subjective improvement in sx able to speak in clear complete sentences   Abdominal: Soft. Normal appearance and bowel sounds are normal. He exhibits no distension. There is no tenderness.   Musculoskeletal: Normal range of motion. He exhibits no edema or deformity.   Neurological: He is alert and oriented to person, place, and time. He exhibits normal muscle tone. Coordination normal. GCS eye subscore is 4. GCS verbal subscore is 5. GCS motor subscore is 6.   Skin: Skin is warm, dry and intact. He is not diaphoretic. No pallor.   Psychiatric: He has a normal mood and affect. His speech is normal and behavior is normal. Judgment  and thought content normal. Cognition and memory are normal.   Nursing note and vitals reviewed.      Assessment:       1. Exacerbation of asthma, unspecified asthma severity, unspecified whether persistent        Plan:         Exacerbation of asthma, unspecified asthma severity, unspecified whether persistent  -     ipratropium 0.02 % nebulizer solution 0.5 mg; Take 2.5 mLs (0.5 mg total) by nebulization one time.  -     albuterol nebulizer solution 2.5 mg; Take 3 mLs (2.5 mg total) by nebulization one time.  -     methylPREDNISolone sod suc(PF) injection 125 mg; Inject 125 mg into the muscle one time.  -     albuterol nebulizer solution 2.5 mg; Take 3 mLs (2.5 mg total) by nebulization one time.  -     methylPREDNISolone (MEDROL DOSEPACK) 4 mg tablet; use as directed  Dispense: 1 Package; Refill: 0    Other orders  -     albuterol nebulizer solution 2.5 mg; Take 3 mLs (2.5 mg total) by nebulization one time.  -     Cancel: ipratropium 0.02 % nebulizer solution 0.5 mg; Take 2.5 mLs (0.5 mg total) by nebulization one time.  -     Discontinue: methylPREDNISolone sod suc(PF) injection 125 mg; Inject 125 mg into the vein one time.      pt given two neb treatments and 125mg IM solumedrol. Subjective improvement in sx. Initially was only able to speak in short sentences, after treatment able to speak clearly and feels better. O2 initially 90% RR 22, after 2 treatments RR 20 O2 sat 90% . Contacted dr. Marino - advised do one more albuterol treatment if O2 sat still not improved send to ER. After third treatment RR 20, O2 sat 95%. Pt feels okay to go home tonight, NAD upon leaving the clinic. Advised ER if worsening in the night. Keep appt tomorrow with pcp.     Patient Instructions   PLEASE READ YOUR DISCHARGE INSTRUCTIONS ENTIRELY AS IT CONTAINS IMPORTANT INFORMATION.    Please go to the ER for any worsening condition before your appt    Use your albuterol every 4 hours    Take the steroids to completion    Please keep  your appt tomorrow morning        You must understand that you have received an Urgent Care treatment only and that you may be released before all of your medical problems are known or treated.        Asthma (Adult)  Asthma is a disease where the medium and  small air passages within the lung go into spasm and restrict the flow of air. Inflammation and swelling of the airways cause further restriction. During an acute asthma attack, these factors cause difficulty breathing, wheezing, cough and chest tightness.    An asthma attack can be triggered by many things. Common triggers include infections such as the common cold, bronchitis, pneumonia. Irritants such as smoke or pollutants in the air, emotional upset, and exercise can also trigger an attack. In many adults with asthma, allergies to dust, mold, pollen and animal dander can cause an asthma attack. Skipping doses of daily asthma medicine can also bring on an asthma attack.  Asthma can be controlled using the proper medicines prescribed by your healthcare provider and avoiding exposure to known triggers including allergens and irritants.  Home care  · Take prescribed medicine exactly at the times advised. If you need medicine such as from a hand held inhaler or aerosol breathing machine more than every 4 hours, contact your healthcare provider or seek immediate medical attention. If prescribed an antibiotic or prednisone, take all of the medicine as prescribed, even if you are feeling better after a few days.  · Do not smoke. Avoid being exposed to the smoke of others.  · Some people with asthma have worsening of their symptoms when they take aspirin and non-steroidal or fever-reducing medicines like ibuprofen and naproxen. Talk to your healthcare provider if you think this may apply to you.  Follow-up care  Follow up with your healthcare provider, or as advised. Always bring all of your current medicines to any appointments with your healthcare provider. Also  "bring a complete list of medications even those not taken for asthma. If you do not already have one, talk to your healthcare provider about developing a personalized "Asthma Action Plan."  A pneumococcal (pneumonia) vaccine and yearly flu shot (every fall) are recommended. Ask your doctor about this.  When to seek medical advice  Call your healthcare provider right away if any of these occur:   · Increased wheezing or shortness of breath  · Need to use your inhalers more often than usual without relief  · Fever of 100.4ºF (38ºC) or higher, or as directed by your healthcare provider  · Coughing up lots of dark-colored or bloody sputum (mucus)  · Chest pain with each breath  · If you use a peak flow meter as part of an Asthma Action Plan, and you are still in the yellow zone (50% to 80%) 15 minutes after using inhaler medicine.  Call 911  Call 911 if any of the following occur  · Trouble walking or talking because of shortness of breath  · If you use a peak flow meter as part of an Asthma Action Plan and you are still in the red zone (less than 50%) 15 minutes after using inhaler medicine  · Lips or fingernails turning gray or blue  Date Last Reviewed: 12/2/2015  © 4149-9258 [employer]. [address] [place] [address], [place] [address]. All rights reserved. This information is not intended as a substitute for professional medical care. Always follow your healthcare professional's instructions.            "

## 2018-10-17 NOTE — CONSULTS
[facility]  Pulmonology  Consult Note    Patient Name: Roman Quinn  MRN: [id number]  Admission Date: 10/17/2018  Hospital Length of Stay: 0 days  Code Status: Full Code  Attending Physician: JESUS Marcano MD  Primary Care Provider: Hung Reid MD   Principal Problem: Pneumomediastinum    Inpatient consult to Pulmonology  Consult performed by: Rose Finney DO  Consult ordered by: Maria Fernanda Middleton MD        Subjective:     HPI:  Mr. Quinn is a 33-year-old gentleman with a history of asthma who presents due to an abnormal chest x-ray.  Three days ago he developed shortness of breath, wheezing, and upper respiratory symptoms related to his usual asthma exacerbation symptoms.  Due to progressive shortness of breath, he presented last night to urgent care where he was treated with several nebulizer treatments and had a chest x-ray performed.  The chest x-ray demonstrated pneumomediastinum.  He has not been coughing a significant amount, and he denies retching and vomiting. He reports about 1 exacerbation per year, commonly around the time of weather changes in the fall.  He is currently not on maintenance inhalers, but reports that at baseline he still uses his albuterol daily.  He has never been hospitalized or intubated for his asthma.  He is a never smoker.    He has noticed some voice change today and a mild sore throat, but denies chest pain, worsened shortness of breath, dysphagia, and odynophagia.     Past Medical History:   Diagnosis Date    Allergic conjunctivitis     Asthma     Hyperlipidemia     Hypertension        Past Surgical History:   Procedure Laterality Date    HERNIA REPAIR         Review of patient's allergies indicates:   Allergen Reactions    Iodine and iodide containing products Anaphylaxis     Throat closing & itching.       Family History     Problem Relation (Age of Onset)    Cancer Maternal Grandmother, Paternal Grandmother    Diabetes Mother    No  Known Problems Father, Sister        Tobacco Use    Smoking status: Never Smoker    Smokeless tobacco: Never Used   Substance and Sexual Activity    Alcohol use: Yes     Comment: 10 drinks per week. Wine, Beer or mixed drinks.    Drug use: No    Sexual activity: Yes     Partners: Female         Review of Systems   Constitutional: Negative for appetite change, chills, fever and unexpected weight change.   HENT: Positive for congestion, sore throat and voice change. Negative for postnasal drip and trouble swallowing.    Respiratory: Positive for cough, shortness of breath and wheezing. Negative for choking.    Cardiovascular: Negative for chest pain and leg swelling.   Gastrointestinal: Negative for abdominal distention, abdominal pain, constipation, diarrhea, nausea and vomiting.        No heartburn   Genitourinary: Negative for difficulty urinating and hematuria.   Musculoskeletal: Negative for arthralgias and joint swelling.   Skin: Negative for rash.   Neurological: Negative for facial asymmetry and speech difficulty.     Objective:     Vital Signs (Most Recent):  Temp: 98.3 °F (36.8 °C) (10/17/18 1550)  Pulse: 106 (10/17/18 1638)  Resp: 20 (10/17/18 1638)  BP: 126/76 (10/17/18 1550)  SpO2: 96 % (10/17/18 1635) Vital Signs (24h Range):  Temp:  [98.3 °F (36.8 °C)-99 °F (37.2 °C)] 98.3 °F (36.8 °C)  Pulse:  [] 106  Resp:  [13-22] 20  SpO2:  [90 %-97 %] 96 %  BP: (114-157)/() 126/76        There is no height or weight on file to calculate BMI.      Intake/Output Summary (Last 24 hours) at 10/17/2018 1807  Last data filed at 10/17/2018 1349  Gross per 24 hour   Intake 1000 ml   Output --   Net 1000 ml       Physical Exam   Constitutional: He appears well-developed and well-nourished. No distress.   HENT:   Head: Normocephalic and atraumatic.   Mouth/Throat: No oropharyngeal exudate.   Mallampati 2   Eyes: No scleral icterus.   Neck: Neck supple.   No crepitus or adenopathy. No tracheal shift.    Cardiovascular:   Minimally tachycardic regular rhythm, no murmurs   Pulmonary/Chest: Effort normal. No stridor. No respiratory distress. He has no wheezes. He has no rales.   Equal breath sounds throughout.  No conversational dyspnea.   Abdominal: Soft. He exhibits no distension. There is no tenderness.   Musculoskeletal: He exhibits no edema or deformity.   Neurological: He is alert. He exhibits normal muscle tone.   Answering questions appropriately, face symmetric, moving all extremities.   Skin: Skin is warm and dry. No rash noted.   Psychiatric: He has a normal mood and affect. His behavior is normal.         Lines/Drains/Airways     Peripheral Intravenous Line                 Peripheral IV - Single Lumen 10/17/18 1057 Right Antecubital less than 1 day                Significant Labs:    CBC/Anemia Profile:  Recent Labs   Lab  10/17/18   1057   WBC  11.68   HGB  15.4   HCT  46.2   PLT  287   MCV  89   RDW  13.0        Chemistries:  Recent Labs   Lab  10/17/18   1057   NA  140   K  4.8   CL  104   CO2  27   BUN  17   CREATININE  0.9   CALCIUM  10.0       All pertinent labs within the past 24 hours have been reviewed.    Significant Imaging:   CT: I have reviewed all pertinent results/findings within the past 24 hours and my personal findings are:  Pneumomediastinum extending up into the neck, with air scattered in the prevertebral tissues, lateral thorax.  No pleural effusions.  CXR: I have reviewed all pertinent results/findings within the past 24 hours and my personal findings are:  Pneumomediastinum.  No opacities identified.   Esophagram: no evidence of perforation or extravasation of contrast    Assessment/Plan:     * Pneumomediastinum    Pneumomediastinum likely due to his acute exacerbation of asthma.  Based on his history and normal esophagram, it is unlikely that there is an esophageal perforation.  -Continue to monitor clinically. We discussed warning signs such as difficulty swallowing, difficulty  speaking, increased shortness of breath that would be signs of concerning progression.  He is also a risk of pneumothorax.  -aggressive asthma management          Subcutaneous emphysema    Due to pneumomediastinum and asthma exacerbation, benign.        Moderate persistent asthma with acute exacerbation    -agree with scheduled nebs  -adding Solu-Medrol Q6 hours  -agreeing with maintenance ICS for his suboptimally controlled asthma  -may benefit from a nasal steroid if he has upper respiratory symptoms              Thank you for your consult. I will follow-up with patient. Please contact us if you have any additional questions.     Rose Finney, DO  Pulmonology  [facility]

## 2018-10-18 ENCOUNTER — TELEPHONE (OUTPATIENT)
Dept: URGENT CARE | Facility: CLINIC | Age: 33
End: 2018-10-18

## 2018-10-18 VITALS
DIASTOLIC BLOOD PRESSURE: 67 MMHG | WEIGHT: 242 LBS | BODY MASS INDEX: 32.78 KG/M2 | RESPIRATION RATE: 18 BRPM | TEMPERATURE: 99 F | SYSTOLIC BLOOD PRESSURE: 120 MMHG | HEIGHT: 72 IN | OXYGEN SATURATION: 93 % | HEART RATE: 116 BPM

## 2018-10-18 LAB
ANION GAP SERPL CALC-SCNC: 9 MMOL/L
BASOPHILS # BLD AUTO: 0 K/UL
BASOPHILS NFR BLD: 0 %
BUN SERPL-MCNC: 17 MG/DL
CALCIUM SERPL-MCNC: 9.5 MG/DL
CHLORIDE SERPL-SCNC: 105 MMOL/L
CO2 SERPL-SCNC: 25 MMOL/L
CREAT SERPL-MCNC: 0.9 MG/DL
DIFFERENTIAL METHOD: ABNORMAL
EOSINOPHIL # BLD AUTO: 0 K/UL
EOSINOPHIL NFR BLD: 0.1 %
ERYTHROCYTE [DISTWIDTH] IN BLOOD BY AUTOMATED COUNT: 13.4 %
EST. GFR  (AFRICAN AMERICAN): >60 ML/MIN/1.73 M^2
EST. GFR  (NON AFRICAN AMERICAN): >60 ML/MIN/1.73 M^2
GLUCOSE SERPL-MCNC: 203 MG/DL
HCT VFR BLD AUTO: 44.2 %
HGB BLD-MCNC: 14.6 G/DL
LYMPHOCYTES # BLD AUTO: 0.8 K/UL
LYMPHOCYTES NFR BLD: 6.2 %
MAGNESIUM SERPL-MCNC: 2.4 MG/DL
MCH RBC QN AUTO: 30 PG
MCHC RBC AUTO-ENTMCNC: 33 G/DL
MCV RBC AUTO: 91 FL
MONOCYTES # BLD AUTO: 0.3 K/UL
MONOCYTES NFR BLD: 2.4 %
NEUTROPHILS # BLD AUTO: 11.3 K/UL
NEUTROPHILS NFR BLD: 91.1 %
PHOSPHATE SERPL-MCNC: 2.3 MG/DL
PLATELET # BLD AUTO: 270 K/UL
PMV BLD AUTO: 10 FL
POTASSIUM SERPL-SCNC: 4.7 MMOL/L
RBC # BLD AUTO: 4.87 M/UL
SODIUM SERPL-SCNC: 139 MMOL/L
WBC # BLD AUTO: 12.43 K/UL

## 2018-10-18 PROCEDURE — 36415 COLL VENOUS BLD VENIPUNCTURE: CPT

## 2018-10-18 PROCEDURE — 27000221 HC OXYGEN, UP TO 24 HOURS

## 2018-10-18 PROCEDURE — 99239 HOSP IP/OBS DSCHRG MGMT >30: CPT | Mod: ,,, | Performed by: INTERNAL MEDICINE

## 2018-10-18 PROCEDURE — 94640 AIRWAY INHALATION TREATMENT: CPT

## 2018-10-18 PROCEDURE — 25000242 PHARM REV CODE 250 ALT 637 W/ HCPCS: Performed by: INTERNAL MEDICINE

## 2018-10-18 PROCEDURE — 85025 COMPLETE CBC W/AUTO DIFF WBC: CPT

## 2018-10-18 PROCEDURE — 84100 ASSAY OF PHOSPHORUS: CPT

## 2018-10-18 PROCEDURE — 83735 ASSAY OF MAGNESIUM: CPT

## 2018-10-18 PROCEDURE — 94761 N-INVAS EAR/PLS OXIMETRY MLT: CPT

## 2018-10-18 PROCEDURE — 80048 BASIC METABOLIC PNL TOTAL CA: CPT

## 2018-10-18 PROCEDURE — 99232 SBSQ HOSP IP/OBS MODERATE 35: CPT | Mod: ,,, | Performed by: INTERNAL MEDICINE

## 2018-10-18 PROCEDURE — 25000003 PHARM REV CODE 250: Performed by: INTERNAL MEDICINE

## 2018-10-18 PROCEDURE — 63600175 PHARM REV CODE 636 W HCPCS: Performed by: INTERNAL MEDICINE

## 2018-10-18 RX ORDER — PREDNISONE 20 MG/1
40 TABLET ORAL DAILY
Qty: 10 TABLET | Refills: 0 | Status: SHIPPED | OUTPATIENT
Start: 2018-10-19 | End: 2018-10-18 | Stop reason: SDUPTHER

## 2018-10-18 RX ORDER — FLUTICASONE FUROATE AND VILANTEROL 100; 25 UG/1; UG/1
1 POWDER RESPIRATORY (INHALATION) DAILY
Qty: 60 EACH | Refills: 3 | Status: SHIPPED | OUTPATIENT
Start: 2018-10-18 | End: 2018-10-26 | Stop reason: SDUPTHER

## 2018-10-18 RX ORDER — ALBUTEROL SULFATE 90 UG/1
2 AEROSOL, METERED RESPIRATORY (INHALATION) EVERY 6 HOURS PRN
Qty: 8 G | Refills: 4 | Status: SHIPPED | OUTPATIENT
Start: 2018-10-18 | End: 2019-09-11 | Stop reason: SDUPTHER

## 2018-10-18 RX ORDER — IPRATROPIUM BROMIDE AND ALBUTEROL SULFATE 2.5; .5 MG/3ML; MG/3ML
3 SOLUTION RESPIRATORY (INHALATION)
Status: DISCONTINUED | OUTPATIENT
Start: 2018-10-18 | End: 2018-10-18

## 2018-10-18 RX ORDER — IPRATROPIUM BROMIDE AND ALBUTEROL SULFATE 2.5; .5 MG/3ML; MG/3ML
3 SOLUTION RESPIRATORY (INHALATION) EVERY 4 HOURS PRN
Status: DISCONTINUED | OUTPATIENT
Start: 2018-10-18 | End: 2018-10-18 | Stop reason: HOSPADM

## 2018-10-18 RX ORDER — PREDNISONE 20 MG/1
40 TABLET ORAL DAILY
Qty: 8 TABLET | Refills: 0 | Status: SHIPPED | OUTPATIENT
Start: 2018-10-19 | End: 2018-10-23

## 2018-10-18 RX ADMIN — IPRATROPIUM BROMIDE AND ALBUTEROL SULFATE 3 ML: .5; 3 SOLUTION RESPIRATORY (INHALATION) at 07:10

## 2018-10-18 RX ADMIN — IPRATROPIUM BROMIDE AND ALBUTEROL SULFATE 3 ML: .5; 3 SOLUTION RESPIRATORY (INHALATION) at 04:10

## 2018-10-18 RX ADMIN — FLUTICASONE PROPIONATE 1 PUFF: 220 AEROSOL, METERED RESPIRATORY (INHALATION) at 07:10

## 2018-10-18 RX ADMIN — AMLODIPINE BESYLATE 5 MG: 2.5 TABLET ORAL at 09:10

## 2018-10-18 RX ADMIN — METHYLPREDNISOLONE SODIUM SUCCINATE 40 MG: 40 INJECTION, POWDER, FOR SOLUTION INTRAMUSCULAR; INTRAVENOUS at 12:10

## 2018-10-18 RX ADMIN — FLUTICASONE PROPIONATE 100 MCG: 50 SPRAY, METERED NASAL at 09:10

## 2018-10-18 RX ADMIN — METHYLPREDNISOLONE SODIUM SUCCINATE 40 MG: 40 INJECTION, POWDER, FOR SOLUTION INTRAMUSCULAR; INTRAVENOUS at 06:10

## 2018-10-18 RX ADMIN — IPRATROPIUM BROMIDE AND ALBUTEROL SULFATE 3 ML: .5; 3 SOLUTION RESPIRATORY (INHALATION) at 12:10

## 2018-10-18 RX ADMIN — OLMESARTAN MEDOXOMIL 20 MG: 20 TABLET, COATED ORAL at 09:10

## 2018-10-18 RX ADMIN — METHYLPREDNISOLONE SODIUM SUCCINATE 40 MG: 40 INJECTION, POWDER, FOR SOLUTION INTRAMUSCULAR; INTRAVENOUS at 02:10

## 2018-10-18 RX ADMIN — IPRATROPIUM BROMIDE AND ALBUTEROL SULFATE 3 ML: .5; 3 SOLUTION RESPIRATORY (INHALATION) at 11:10

## 2018-10-18 NOTE — PLAN OF CARE
Problem: Patient Care Overview  Goal: Plan of Care Review  Outcome: Ongoing (interventions implemented as appropriate)  PLAN OF CARE REVIEWED WITH PT AND PT'S SPOUSE.  PT'S SPOUSE AT BEDSIDE.  PT AA+OX4.  ABLE TO MAKE NEEDS KNOWN.  DOES NOT APPEAR TO BE IN ANY DISTRESS.  NO C/O PAIN.  INDEPENDENT WITH ADLS.  CONT. OF B/B.  PT REMAINS FREE FROM FALLS, INJURY, AND TRAUMA.  FALL PRECAUTIONS IN PLACE.  BED IN LOWEST POSITION WITH WHEELS LOCKED.  CALL LIGHT WITHIN REACH.  WILL CONTINUE TO MONITOR.

## 2018-10-18 NOTE — PROGRESS NOTES
[facility]  Pulmonology  Progress Note    Patient Name: Roman Quinn  MRN: [id number]  Admission Date: 10/17/2018  Hospital Length of Stay: 1 days  Code Status: Full Code  Attending Provider: JESUS Marcano MD  Primary Care Provider: Hung Reid MD   Principal Problem: Pneumomediastinum    Subjective:     Interval History: This morning Mr. Quinn denies new complaints. No events overnight. No hoarseness. SOB, dysphagia, odynophagia. He admits to mild tenderness to palpation of the left side of his neck.     Objective:     Vital Signs (Most Recent):  Temp: 98 °F (36.7 °C) (10/18/18 0752)  Pulse: (!) 122 (10/18/18 0800)  Resp: 18 (10/18/18 0757)  BP: 132/86 (10/18/18 0752)  SpO2: 98 % (10/18/18 0757) Vital Signs (24h Range):  Temp:  [97.5 °F (36.4 °C)-98.3 °F (36.8 °C)] 98 °F (36.7 °C)  Pulse:  [] 122  Resp:  [13-20] 18  SpO2:  [92 %-98 %] 98 %  BP: (122-157)/() 132/86     Weight: 109.8 kg (242 lb)  Body mass index is 32.82 kg/m².      Intake/Output Summary (Last 24 hours) at 10/18/2018 1046  Last data filed at 10/18/2018 0643  Gross per 24 hour   Intake 1480 ml   Output --   Net 1480 ml       Physical Exam   Constitutional: He appears well-developed and well-nourished.   HENT:   Head: Normocephalic and atraumatic.   Eyes: No scleral icterus.   Neck: Neck supple. No tracheal deviation present.   Minimal palpable supraclavicular crepitus   Cardiovascular: Normal rate and regular rhythm.   No murmur heard.  Pulmonary/Chest: Effort normal.   Decreased BS in left base, no wheezing or prolonged expiration. No rhales.   Abdominal: Soft. He exhibits no distension.   Musculoskeletal: He exhibits no edema or deformity.   Lymphadenopathy:     He has no cervical adenopathy.   Neurological: He is alert.   Skin: Skin is warm and dry. No rash noted.   Psychiatric: He has a normal mood and affect. His behavior is normal.   Vitals reviewed.      Lines/Drains/Airways     Peripheral Intravenous  Line                 Peripheral IV - Single Lumen 10/17/18 1057 Right Antecubital less than 1 day                Significant Labs:    CBC/Anemia Profile:  Recent Labs   Lab 10/17/18  1057 10/18/18  0420   WBC 11.68 12.43   HGB 15.4 14.6   HCT 46.2 44.2    270   MCV 89 91   RDW 13.0 13.4        Chemistries:  Recent Labs   Lab 10/17/18  1057 10/18/18  0421    139   K 4.8 4.7    105   CO2 27 25   BUN 17 17   CREATININE 0.9 0.9   CALCIUM 10.0 9.5   MG  --  2.4   PHOS  --  2.3*       All pertinent labs within the past 24 hours have been reviewed.    Significant Imaging:  CXR: I have reviewed all pertinent results/findings within the past 24 hours and my personal findings are:  Repeat CXR with stable pneumomediastinum, no pneumothorax. No significant increase in air traveling into the neck or supraclavicular tissues.    Assessment/Plan:     * Pneumomediastinum    Pneumomediastinum likely due to his acute exacerbation of asthma.  Stable on repeat CXR.  -Likely safe for discharge, although he remains at risk for pneumothorax until the mediastinal air resolves. He has been counseled to return to the ED if he has sudden onset of SOB.  -optimize asthma management       Moderate persistent asthma with acute exacerbation    -Recommend 5 days of prednisone 40mg  -Recommend LABA-ICS inhaler. Due to the severity of this complication of his exacerbation, he should be stable for several months before being tapered off of his LABA-ICS. Ideally, he should be using his albuterol <1 time/ week before he is deescalated.  -Given his seasonal variation, he likely will need maintenance inhalers yearly during his symptomatic seasons, regardless of his symptom control in the months preceding.   -Optimize allergy control with nasal ICS or H1 blockers as needed.  -If desired, he can follow up with Dr. Aj at [facility].       He was counseled on the importance of getting a flu shot.     Rose Finney,  DO  Pulmonology  [facility]

## 2018-10-18 NOTE — PROGRESS NOTES
[facility] Hospital Medicine  Progress Note    Patient Name: Roman Quinn  MRN: [id number]  Patient Class: IP- Inpatient   Admission Date: 10/17/2018  Length of Stay: 1 days  Attending Physician: JESUS Marcano MD  Primary Care Provider: Hung Reid MD        Subjective:     Principal Problem:Pneumomediastinum    HPI:  Mr. Quinn is a 33/M with PMH asthma, HTN, HLD who presented to ED 10/17 after being referred by his PCP with abnormal CXR findings concerning for pneumomediastinum. His symptoms began approximately 2 days prior to admission with increased cough and SOB; he notes that he frequently has issues with his asthma with weather changes. Starting the Monday prior to admission he began having to use his inhaler multiple times throughout the day; he reports usually using his inhaler daily for symptom control. He also noted productive cough. With persistence of his symptoms he presented to urgent care on 10/16 in the evening and received albuterol-ipratropium inhalation x 2 as well as methylprednisolone 125mg IM. He presented to his PCP in the morning of 10/17 for further evaluation and with concern for PNA was prescribed levofloxacin, fluticasone-vilanterol and CXR was ordered; CXR demonstrated pneumomediastinum and it was recommended he present to the ED for further evaluation.    He denies fever, chills, CP, nausea/vomiting, abdominal pain or persistent retching. Complains of palpitations with nebulization treatments.    Hospital Course:  After admission, esophagram was obtained that demonstrated no esophageal rupture. It was suspected that his pneumomediastinum was secondary to his asthma exacerbation. Pulmonology was consulted for further management recommendations and he was started on inhaled fluticasone and scheduled albuterol-ipratropium nebulizations as well as IV methylprednisolone.    Interval History: No acute events overnight. Breathing improved from prior with less  wheezing. No other concerns at this time.     Review of Systems   Constitutional: Negative for chills and fever.   Respiratory: Negative for cough and shortness of breath.    Cardiovascular: Negative for chest pain and palpitations.   Gastrointestinal: Negative for abdominal pain, nausea and vomiting.     Objective:     Vital Signs (Most Recent):  Temp: 98 °F (36.7 °C) (10/18/18 0752)  Pulse: (!) 122 (10/18/18 0800)  Resp: 18 (10/18/18 0757)  BP: 132/86 (10/18/18 0752)  SpO2: 98 % (10/18/18 0757) Vital Signs (24h Range):  Temp:  [97.5 °F (36.4 °C)-98.3 °F (36.8 °C)] 98 °F (36.7 °C)  Pulse:  [] 122  Resp:  [13-20] 18  SpO2:  [92 %-98 %] 98 %  BP: (122-157)/() 132/86     Weight: 109.8 kg (242 lb)  Body mass index is 32.82 kg/m².    Intake/Output Summary (Last 24 hours) at 10/18/2018 1057  Last data filed at 10/18/2018 0643  Gross per 24 hour   Intake 1480 ml   Output --   Net 1480 ml      Physical Exam   Constitutional: He is oriented to person, place, and time. He appears well-developed. No distress.   HENT:   Head: Normocephalic and atraumatic.   Eyes: Conjunctivae and EOM are normal.   Cardiovascular: Regular rhythm, S1 normal, S2 normal and intact distal pulses. Tachycardia present.   Pulmonary/Chest: Effort normal and breath sounds normal.   Abdominal: Soft. He exhibits no distension. There is no tenderness.   Musculoskeletal: Normal range of motion. He exhibits no edema.   Neurological: He is alert and oriented to person, place, and time.   Skin: Skin is warm and dry.   Nursing note and vitals reviewed.  Significant Labs:   CBC:  Recent Labs   Lab 10/18/18  0420   WBC 12.43   HGB 14.6   HCT 44.2      GRAN 91.1*  11.3*   LYMPH 6.2*  0.8*   MONO 2.4*  0.3   EOS 0.0   BASO 0.00      BMP:  Recent Labs   Lab 10/17/18  1057 10/18/18  0421    139   K 4.8 4.7    105   CO2 27 25   BUN 17 17   CREATININE 0.9 0.9   * 203*   CALCIUM 10.0 9.5   MG  --  2.4   PHOS  --  2.3*      Significant Imaging:   CXR 10/18/18:  Persistent pneumomediastinum with slight improvement from prior.    Assessment/Plan:      * Pneumomediastinum    - Suspect increased pressures with asthma resulted in spontaneous pneumomediastinum.  - Continuing albuterol-ipratropium 2.5-0.5mg inhaled q4hr, fluticasone 220mcg inhaled q12hr, and give methylprednisolone 40mg IV q6hr.  - Goal SpO2 >92%.  - Appreciate pulmonology assistance.     Subcutaneous emphysema    - As under pneumomediastinum.     Moderate persistent asthma with acute exacerbation    - As under pneumomediastinum.     Hyperlipidemia    - Continue simvastatin 20mg PO qHS.     Essential hypertension    - Continue amlodipine 5mg PO daily, olmesartan 20mg PO daily.       VTE Risk Mitigation (From admission, onward)        Ordered     enoxaparin injection 40 mg  Daily      10/17/18 1524     IP VTE HIGH RISK PATIENT  Once      10/17/18 1602     Place sequential compression device  Until discontinued      10/17/18 1440        DDakota Marx MD  Department of Hospital Medicine   [facility]  [phone]

## 2018-10-18 NOTE — SUBJECTIVE & OBJECTIVE
Interval History: No acute events overnight. Breathing improved from prior with less wheezing. No other concerns at this time.     Review of Systems   Constitutional: Negative for chills and fever.   Respiratory: Negative for cough and shortness of breath.    Cardiovascular: Negative for chest pain and palpitations.   Gastrointestinal: Negative for abdominal pain, nausea and vomiting.     Objective:     Vital Signs (Most Recent):  Temp: 98 °F (36.7 °C) (10/18/18 0752)  Pulse: (!) 122 (10/18/18 0800)  Resp: 18 (10/18/18 0757)  BP: 132/86 (10/18/18 0752)  SpO2: 98 % (10/18/18 0757) Vital Signs (24h Range):  Temp:  [97.5 °F (36.4 °C)-98.3 °F (36.8 °C)] 98 °F (36.7 °C)  Pulse:  [] 122  Resp:  [13-20] 18  SpO2:  [92 %-98 %] 98 %  BP: (122-157)/() 132/86     Weight: 109.8 kg (242 lb)  Body mass index is 32.82 kg/m².    Intake/Output Summary (Last 24 hours) at 10/18/2018 1051  Last data filed at 10/18/2018 0643  Gross per 24 hour   Intake 1480 ml   Output --   Net 1480 ml      Physical Exam   Constitutional: He is oriented to person, place, and time. He appears well-developed. No distress.   HENT:   Head: Normocephalic and atraumatic.   Eyes: Conjunctivae and EOM are normal.   Cardiovascular: Normal rate, regular rhythm, S1 normal, S2 normal and intact distal pulses.   Pulmonary/Chest: Effort normal and breath sounds normal.   Abdominal: Soft. He exhibits no distension. There is no tenderness.   Musculoskeletal: Normal range of motion. He exhibits no edema.   Neurological: He is alert and oriented to person, place, and time.   Skin: Skin is warm and dry.   Nursing note and vitals reviewed.    Significant Labs:   CBC:  Recent Labs   Lab 10/18/18  0420   WBC 12.43   HGB 14.6   HCT 44.2      GRAN 91.1*  11.3*   LYMPH 6.2*  0.8*   MONO 2.4*  0.3   EOS 0.0   BASO 0.00      BMP:  Recent Labs   Lab 10/17/18  1057 10/18/18  0421    139   K 4.8 4.7    105   CO2 27 25   BUN 17 17   CREATININE 0.9  0.9   * 203*   CALCIUM 10.0 9.5   MG  --  2.4   PHOS  --  2.3*     Significant Imaging:   CXR 10/18/18:  Persistent pneumomediastinum with slight improvement from prior.

## 2018-10-18 NOTE — HOSPITAL COURSE
After admission, esophagram was obtained that demonstrated no esophageal rupture. It was suspected that his pneumomediastinum was secondary to his asthma exacerbation. Pulmonology was consulted for further management recommendations and he was started on inhaled fluticasone and scheduled albuterol-ipratropium nebulizations as well as IV methylprednisolone. With improvement in his respiratory status and discussion with pulmonology, he was transitioned to prednisone 40mg PO daily to complete a 5-day course of steroids as well as adding fluticasone-vilanterol to his home asthma regimen. His pneumomediastinum will continue to reabsorb; warning signs were discussed for worsening or new pneumothorax. With improvement in his respiratory status and adequate control of his asthma exacerbation he was prepared for discharge.

## 2018-10-18 NOTE — NURSING
Pt tolerating room air well. Weaned down from O2. Sat now 93 % on room air. Pt lungs sound clear but diminished. No resp distress. No wheezing. Vs stable. Up ad pelon in room. Discharge orders noted. IV dc'd and telemetry dc'd. Given verbal and written discharge instructions. Verbalizes understanding of home meds and follow up visit. Discharged in stable condition.

## 2018-10-18 NOTE — ASSESSMENT & PLAN NOTE
Pneumomediastinum likely due to his acute exacerbation of asthma.  Stable on repeat CXR.  -Likely safe for discharge, although he remains at risk for pneumothorax until the mediastinal air resolves. He has been counseled to return to the ED if he has sudden onset of SOB.  -optimize asthma management

## 2018-10-18 NOTE — DISCHARGE SUMMARY
[facility] Hospital Medicine  Discharge Summary      Patient Name: Roman Quinn  MRN: [id number]  Admission Date: 10/17/2018  Hospital Length of Stay: 1 days  Discharge Date and Time: 10/18/2018  4:35 PM  Attending Physician: JESUS Marcano MD   Discharging Provider: TIMMY Marcano MD  Primary Care Provider: Hung Reid MD      HPI:   Mr. Quinn is a 33/M with PMH asthma, HTN, HLD who presented to ED 10/17 after being referred by his PCP with abnormal CXR findings concerning for pneumomediastinum. His symptoms began approximately 2 days prior to admission with increased cough and SOB; he notes that he frequently has issues with his asthma with weather changes. Starting the Monday prior to admission he began having to use his inhaler multiple times throughout the day; he reports usually using his inhaler daily for symptom control. He also noted productive cough. With persistence of his symptoms he presented to urgent care on 10/16 in the evening and received albuterol-ipratropium inhalation x 2 as well as methylprednisolone 125mg IM. He presented to his PCP in the morning of 10/17 for further evaluation and with concern for PNA was prescribed levofloxacin, fluticasone-vilanterol and CXR was ordered; CXR demonstrated pneumomediastinum and it was recommended he present to the ED for further evaluation.    He denies fever, chills, CP, nausea/vomiting, abdominal pain or persistent retching. Complains of palpitations with nebulization treatments.    * No surgery found *      Hospital Course:   After admission, esophagram was obtained that demonstrated no esophageal rupture. It was suspected that his pneumomediastinum was secondary to his asthma exacerbation. Pulmonology was consulted for further management recommendations and he was started on inhaled fluticasone and scheduled albuterol-ipratropium nebulizations as well as IV methylprednisolone. With improvement in his respiratory status and  discussion with pulmonology, he was transitioned to prednisone 40mg PO daily to complete a 5-day course of steroids as well as adding fluticasone-vilanterol to his home asthma regimen. His pneumomediastinum will continue to reabsorb; warning signs were discussed for worsening or new pneumothorax. With improvement in his respiratory status and adequate control of his asthma exacerbation he was prepared for discharge.     Consults:   Consults (From admission, onward)        Status Ordering Provider     Inpatient consult to Pulmonology  Once     Provider:  Rose Finney, ARLYN Francis        A&GLORIA as under progress note dated 10/18/18.    Final Active Diagnoses:    Diagnosis Date Noted POA    PRINCIPAL PROBLEM:  Pneumomediastinum [J98.2] 10/17/2018 Yes    Subcutaneous emphysema [T79.7XXA] 10/17/2018 Yes    Essential hypertension [I10] 03/04/2013 Yes     Chronic    Hyperlipidemia [E78.5] 03/04/2013 Yes     Chronic    Moderate persistent asthma [J45.40] 03/04/2013 Yes     Chronic    Obesity (BMI 30.0-34.9) [E66.9] 03/04/2013 Yes     Chronic      Problems Resolved During this Admission:       Discharged Condition: good    Disposition: Home or Self Care    Follow Up:  Follow-up Information     Hung Reid MD In 1 week.    Specialty:  Family Medicine  Why:  post-hospital follow-up  Contact information:  [address] [facility] [address]  [phone]             Donato Aj MD. Schedule an appointment as soon as possible for a visit in 2 weeks.    Specialty:  Pulmonary Disease  Why:  Pulmonology - asthma follow-up if desired  Contact information:  [address] [place] [id number]  [phone]                 Patient Instructions:      Notify your health care provider if you experience any of the following:  temperature >100.4     Notify your health care provider if you experience any of the following:  severe uncontrolled pain     Notify your health care  provider if you experience any of the following:  difficulty breathing or increased cough     Activity as tolerated       Significant Diagnostic Studies:   CBC:  Recent Labs   Lab 10/18/18  0420   WBC 12.43   HGB 14.6   HCT 44.2      GRAN 91.1*  11.3*   LYMPH 6.2*  0.8*   MONO 2.4*  0.3   EOS 0.0   BASO 0.00      BMP:  Recent Labs   Lab 10/17/18  1057 10/18/18  0421    139   K 4.8 4.7    105   CO2 27 25   BUN 17 17   CREATININE 0.9 0.9   * 203*   CALCIUM 10.0 9.5   MG  --  2.4   PHOS  --  2.3*     CT Chest WO Contrast 10/17/18:  Extensive pneumomediastinum extending into the neck base, correlating with the 10/17/2018 chest radiograph.  No clear etiology.  No pneumothorax or fluid collections.  No lung consolidation. Fatty change in the liver.    Pending Diagnostic Studies:     None         Medications:  Reconciled Home Medications:      Medication List      START taking these medications    fluticasone-vilanterol 100-25 mcg/dose diskus inhaler  Commonly known as:  BREO  Inhale 1 puff into the lungs once daily. Controller     predniSONE 20 MG tablet  Commonly known as:  DELTASONE  Take 2 tablets (40 mg total) by mouth once daily. for 4 days  Start taking on:  10/19/2018        CHANGE how you take these medications    albuterol 90 mcg/actuation inhaler  Commonly known as:  PROAIR HFA  Inhale 2 puffs into the lungs every 6 (six) hours as needed for Wheezing or Shortness of Breath. Rescue  What changed:  See the new instructions.        CONTINUE taking these medications    amlodipine-olmesartan 5-20 mg per tablet  Commonly known as:  HANG  Take 1 tablet by mouth once daily.     dextromethorphan-guaifenesin  mg  mg per 12 hr tablet  Commonly known as:  MUCINEX DM  Take 1 tablet by mouth every 12 (twelve) hours.     fluticasone 50 mcg/actuation nasal spray  Commonly known as:  FLONASE  2 sprays (100 mcg total) by Each Nare route once daily.     simvastatin 20 MG tablet  Commonly  known as:  ZOCOR  Take 1 tablet (20 mg total) by mouth every evening.        STOP taking these medications    benzonatate 200 MG capsule  Commonly known as:  TESSALON     levoFLOXacin 750 MG tablet  Commonly known as:  LEVAQUIN     methylPREDNISolone 4 mg tablet  Commonly known as:  MEDROL DOSEPACK            Indwelling Lines/Drains at time of discharge:   Lines/Drains/Airways          None        Time spent on the discharge of patient: 40 minutes  Patient was seen and examined on the date of discharge and determined to be suitable for discharge.    TIMMY Marcano MD  Department of Hospital Medicine  [facility]

## 2018-10-18 NOTE — ASSESSMENT & PLAN NOTE
-Recommend 5 days of prednisone 40mg  -Recommend LABA-ICS inhaler. Due to the severity of this complication of his exacerbation, he should be stable for several months before being tapered off of his LABA-ICS. Ideally, he should be using his albuterol <1 time/ week before he is deescalated.  -Given his seasonal variation, he likely will need maintenance inhalers yearly during his symptomatic seasons, regardless of his symptom control in the months preceding.   -Optimize allergy control with nasal ICS or H1 blockers as needed.  -If desired, he can follow up with Dr. Aj at hospitals [facility].

## 2018-10-18 NOTE — PLAN OF CARE
Problem: Patient Care Overview  Goal: Plan of Care Review  Outcome: Ongoing (interventions implemented as appropriate)  Pt on 1.5L NC. Sats 98%. No distress noted. Aerosol tx & MDI tolerated well.Will continue to monitor.

## 2018-10-18 NOTE — PLAN OF CARE
Problem: Ventilation, Mechanical Invasive (Adult)  Goal: Signs and Symptoms of Listed Potential Problems Will be Absent, Minimized or Managed (Ventilation, Mechanical Invasive)  Signs and symptoms of listed potential problems will be absent, minimized or managed by discharge/transition of care (reference Ventilation, Mechanical Invasive (Adult) CPG).  Outcome: Ongoing (interventions implemented as appropriate)   No change in respiratory status, will continue to monitor.

## 2018-10-18 NOTE — ASSESSMENT & PLAN NOTE
- Suspect increased pressures with asthma resulted in spontaneous pneumomediastinum.  - Continuing albuterol-ipratropium 2.5-0.5mg inhaled q4hr, fluticasone 220mcg inhaled q12hr, and give methylprednisolone 40mg IV q6hr.  - Goal SpO2 >92%.  - Appreciate pulmonology assistance.

## 2018-10-18 NOTE — SUBJECTIVE & OBJECTIVE
Interval History: No acute events overnight. Breathing improved from prior with less wheezing. No other concerns at this time.     Review of Systems   Constitutional: Negative for chills and fever.   Respiratory: Negative for cough and shortness of breath.    Cardiovascular: Negative for chest pain and palpitations.   Gastrointestinal: Negative for abdominal pain, nausea and vomiting.     Objective:     Vital Signs (Most Recent):  Temp: 98 °F (36.7 °C) (10/18/18 0752)  Pulse: (!) 122 (10/18/18 0800)  Resp: 18 (10/18/18 0757)  BP: 132/86 (10/18/18 0752)  SpO2: 98 % (10/18/18 0757) Vital Signs (24h Range):  Temp:  [97.5 °F (36.4 °C)-98.3 °F (36.8 °C)] 98 °F (36.7 °C)  Pulse:  [] 122  Resp:  [13-20] 18  SpO2:  [92 %-98 %] 98 %  BP: (122-157)/() 132/86     Weight: 109.8 kg (242 lb)  Body mass index is 32.82 kg/m².    Intake/Output Summary (Last 24 hours) at 10/18/2018 1057  Last data filed at 10/18/2018 0643  Gross per 24 hour   Intake 1480 ml   Output --   Net 1480 ml      Physical Exam   Constitutional: He is oriented to person, place, and time. He appears well-developed. No distress.   HENT:   Head: Normocephalic and atraumatic.   Eyes: Conjunctivae and EOM are normal.   Cardiovascular: Regular rhythm, S1 normal, S2 normal and intact distal pulses. Tachycardia present.   Pulmonary/Chest: Effort normal and breath sounds normal.   Abdominal: Soft. He exhibits no distension. There is no tenderness.   Musculoskeletal: Normal range of motion. He exhibits no edema.   Neurological: He is alert and oriented to person, place, and time.   Skin: Skin is warm and dry.   Nursing note and vitals reviewed.  Significant Labs:   CBC:  Recent Labs   Lab 10/18/18  0420   WBC 12.43   HGB 14.6   HCT 44.2      GRAN 91.1*  11.3*   LYMPH 6.2*  0.8*   MONO 2.4*  0.3   EOS 0.0   BASO 0.00      BMP:  Recent Labs   Lab 10/17/18  1057 10/18/18  0421    139   K 4.8 4.7    105   CO2 27 25   BUN 17 17   CREATININE  0.9 0.9   * 203*   CALCIUM 10.0 9.5   MG  --  2.4   PHOS  --  2.3*     Significant Imaging:   CXR 10/18/18:  Persistent pneumomediastinum with slight improvement from prior.

## 2018-10-26 ENCOUNTER — HOSPITAL ENCOUNTER (OUTPATIENT)
Dept: RADIOLOGY | Facility: HOSPITAL | Age: 33
Discharge: HOME | End: 2018-10-26
Attending: FAMILY MEDICINE
Payer: COMMERCIAL

## 2018-10-26 ENCOUNTER — OFFICE VISIT (OUTPATIENT)
Dept: INTERNAL MEDICINE | Facility: CLINIC | Age: 33
End: 2018-10-26
Payer: COMMERCIAL

## 2018-10-26 VITALS
TEMPERATURE: 98 F | RESPIRATION RATE: 15 BRPM | HEART RATE: 60 BPM | SYSTOLIC BLOOD PRESSURE: 126 MMHG | WEIGHT: 247.38 LBS | BODY MASS INDEX: 33.55 KG/M2 | DIASTOLIC BLOOD PRESSURE: 86 MMHG

## 2018-10-26 DIAGNOSIS — T79.7XXS: ICD-10-CM

## 2018-10-26 DIAGNOSIS — I10 ESSENTIAL HYPERTENSION: Chronic | ICD-10-CM

## 2018-10-26 DIAGNOSIS — Z09 HOSPITAL DISCHARGE FOLLOW-UP: Primary | ICD-10-CM

## 2018-10-26 DIAGNOSIS — J98.2 PNEUMOMEDIASTINUM: ICD-10-CM

## 2018-10-26 DIAGNOSIS — J45.40 MODERATE PERSISTENT ASTHMA WITHOUT COMPLICATION: Chronic | ICD-10-CM

## 2018-10-26 PROCEDURE — 71046 X-RAY EXAM CHEST 2 VIEWS: CPT | Mod: 26,,, | Performed by: RADIOLOGY

## 2018-10-26 PROCEDURE — 99999 PR PBB SHADOW E&M-EST. PATIENT-LVL III: CPT | Mod: PBBFAC,,, | Performed by: FAMILY MEDICINE

## 2018-10-26 PROCEDURE — 71046 X-RAY EXAM CHEST 2 VIEWS: CPT | Mod: TC,PO

## 2018-10-26 PROCEDURE — 99495 TRANSJ CARE MGMT MOD F2F 14D: CPT | Mod: S$GLB,,, | Performed by: FAMILY MEDICINE

## 2018-10-26 RX ORDER — AMLODIPINE BESYLATE 5 MG/1
5 TABLET ORAL DAILY
Qty: 90 TABLET | Refills: 3 | Status: SHIPPED | OUTPATIENT
Start: 2018-10-26 | End: 2019-09-11 | Stop reason: SDUPTHER

## 2018-10-26 RX ORDER — FLUTICASONE FUROATE AND VILANTEROL 100; 25 UG/1; UG/1
1 POWDER RESPIRATORY (INHALATION) DAILY
Qty: 60 EACH | Refills: 11 | Status: SHIPPED | OUTPATIENT
Start: 2018-10-26 | End: 2019-09-11 | Stop reason: SDUPTHER

## 2018-10-26 RX ORDER — OLMESARTAN MEDOXOMIL 20 MG/1
20 TABLET ORAL DAILY
Qty: 90 TABLET | Refills: 3 | Status: SHIPPED | OUTPATIENT
Start: 2018-10-26 | End: 2018-11-02

## 2018-10-26 NOTE — PROGRESS NOTES
Transitional Care Note  Subjective:       Patient ID: Roman Quinn is a 33 y.o. male.  Chief Complaint: Follow-up    Family and/or Caretaker present at visit?  No.  Diagnostic tests reviewed/disposition: I have reviewed all completed as well as pending diagnostic tests at the time of discharge.  Disease/illness education:  Asthma exacerbation and pneumomediastinum  Home health/community services discussion/referrals: Patient does not have home health established from hospital visit.  They do not need home health.  If needed, we will set up home health for the patient.   Establishment or re-establishment of referral orders for community resources: Pulmonology.   Discussion with other health care providers: No discussion with other health care providers necessary.   HPI 33-year-old white male with asthma, hypertension, and hyperlipidemia presents to clinic today for hospital follow-up secondary to and admission on October 17, 2018 and discharged on October 18, 2018 secondary to pneumomediastinum.  The patient had begun noticing increased cough and shortness of breath 1 week prior to admission.  He presented to Urgent Care the night prior to admission and was treated for asthma exacerbation with 3 DuoNeb nebulizer treatments and methylprednisone 125 mg IM.  Secondary to continued worsening symptoms he presented to clinic the following morning for further evaluation.  At that time he was noted to have continued losing but this was localized to the left lower quadrant.  Suspicion for pneumonia was high; therefore, I started the patient on levofloxacin, Breo, and prednisone 40 mg daily.  Chest x-ray was obtained for further evaluation.  Chest x-ray returned later that morning revealing pneumomediastinum for which I recommended that the patient proceed to the emergency room for further evaluation.  Hospital workup included esophagram for possible esophageal rupture.  Esophagram demonstrated no esophageal rupture.   Pneumomediastinum was suspected to be secondary to asthma exacerbation.  The patient was treated with IV steroids and nebulizer treatments while in the hospital.  He was discharged home on prednisone 40 mg daily for 5 days and continued use of Breo.  He reports completion of prednisone and states that he has not required use of his rescue inhaler and approximately 1 week.  He does report occasional mild coughing and mild shortness of breath but overall states substantial improvement.  He continues to have well-controlled hypertension; however, his insurance requires that his combination medication be changed to the 2 separate components of amlodipine and olmesartan.  Review of Systems   Constitutional: Negative for appetite change, chills, fatigue and fever.   HENT: Positive for rhinorrhea. Negative for congestion, ear pain, hearing loss, postnasal drip, sinus pressure, sore throat and tinnitus.    Eyes: Negative for redness, itching and visual disturbance.   Respiratory: Positive for shortness of breath and wheezing. Negative for cough and chest tightness.    Cardiovascular: Negative for chest pain, palpitations and leg swelling.   Gastrointestinal: Negative for abdominal pain, constipation, diarrhea, nausea and vomiting.   Genitourinary: Negative for decreased urine volume, difficulty urinating, dysuria, frequency, hematuria and urgency.   Musculoskeletal: Negative for back pain, myalgias, neck pain and neck stiffness.   Skin: Negative for rash.   Neurological: Negative for dizziness, light-headedness and headaches.   Psychiatric/Behavioral: Negative.        Objective:      Physical Exam   Constitutional: He is oriented to person, place, and time. He appears well-developed and well-nourished. No distress.   HENT:   Head: Normocephalic and atraumatic.   Right Ear: External ear normal.   Left Ear: External ear normal.   Nose: Nose normal.   Mouth/Throat: Oropharynx is clear and moist. No oropharyngeal exudate.    Eyes: Conjunctivae and EOM are normal. Pupils are equal, round, and reactive to light. Right eye exhibits no discharge. Left eye exhibits no discharge. No scleral icterus.   Neck: Normal range of motion. Neck supple. No JVD present. No tracheal deviation present. No thyromegaly present.   Cardiovascular: Normal rate, regular rhythm, normal heart sounds and intact distal pulses. Exam reveals no gallop and no friction rub.   No murmur heard.  Pulmonary/Chest: Effort normal and breath sounds normal. No stridor. No respiratory distress. He has no wheezes. He has no rales.   Abdominal: Soft. Bowel sounds are normal. He exhibits no distension and no mass. There is no tenderness. There is no rebound and no guarding.   Musculoskeletal: Normal range of motion. He exhibits no edema or tenderness.   Lymphadenopathy:     He has no cervical adenopathy.   Neurological: He is alert and oriented to person, place, and time.   Skin: Skin is warm and dry. No rash noted. He is not diaphoretic. No erythema. No pallor.   Psychiatric: He has a normal mood and affect. His behavior is normal. Judgment and thought content normal.   Nursing note and vitals reviewed.      Assessment:       1. Hospital discharge follow-up    2. Subcutaneous emphysema, sequela    3. Pneumomediastinum    4. Moderate persistent asthma without complication    5. Essential hypertension        Plan:         1.  Hospital records have been reviewed.  2.  Continue Breo and albuterol as prescribed.  3.  Repeat chest x-ray now.  4.  Refer to pulmonology for hospital follow-up and further evaluation.  5.  Discontinue amlodipine-olmesartan and start amlodipine 5 mg daily and olmesartan 20 mg daily.  6.  Return to clinic as needed or in 6 months for annual exam.

## 2018-10-26 NOTE — PROGRESS NOTES
Subjective:       Patient ID: Roman Quinn is a 33 y.o. male.    Chief Complaint: Follow-up    HPI  Review of Systems   Constitutional: Negative for appetite change, chills, fatigue and fever.   HENT: Positive for rhinorrhea. Negative for congestion, ear pain, hearing loss, postnasal drip, sinus pressure, sore throat and tinnitus.    Eyes: Negative for redness, itching and visual disturbance.   Respiratory: Positive for shortness of breath and wheezing. Negative for cough and chest tightness.    Cardiovascular: Negative for chest pain, palpitations and leg swelling.   Gastrointestinal: Negative for abdominal pain, constipation, diarrhea, nausea and vomiting.   Genitourinary: Negative for decreased urine volume, difficulty urinating, dysuria, frequency, hematuria and urgency.   Musculoskeletal: Negative for back pain, myalgias, neck pain and neck stiffness.   Skin: Negative for rash.   Neurological: Negative for dizziness, light-headedness and headaches.   Psychiatric/Behavioral: Negative.        Objective:      Physical Exam    Assessment:       No diagnosis found.    Plan:       ***

## 2018-11-02 ENCOUNTER — PATIENT MESSAGE (OUTPATIENT)
Dept: INTERNAL MEDICINE | Facility: CLINIC | Age: 33
End: 2018-11-02

## 2018-11-02 RX ORDER — LISINOPRIL 20 MG/1
20 TABLET ORAL DAILY
Qty: 30 TABLET | Refills: 11 | Status: SHIPPED | OUTPATIENT
Start: 2018-11-02 | End: 2019-09-11 | Stop reason: SDUPTHER

## 2018-12-20 DIAGNOSIS — J45.40 MODERATE PERSISTENT ASTHMA WITHOUT COMPLICATION: Primary | Chronic | ICD-10-CM

## 2018-12-28 ENCOUNTER — PATIENT MESSAGE (OUTPATIENT)
Dept: PULMONOLOGY | Facility: CLINIC | Age: 33
End: 2018-12-28

## 2019-01-04 ENCOUNTER — HOSPITAL ENCOUNTER (OUTPATIENT)
Dept: RADIOLOGY | Facility: HOSPITAL | Age: 34
Discharge: HOME | End: 2019-01-04
Attending: INTERNAL MEDICINE
Payer: COMMERCIAL

## 2019-01-04 ENCOUNTER — OFFICE VISIT (OUTPATIENT)
Dept: PULMONOLOGY | Facility: CLINIC | Age: 34
End: 2019-01-04
Payer: COMMERCIAL

## 2019-01-04 ENCOUNTER — HOSPITAL ENCOUNTER (OUTPATIENT)
Dept: PULMONOLOGY | Facility: CLINIC | Age: 34
Discharge: HOME | End: 2019-01-04
Payer: COMMERCIAL

## 2019-01-04 ENCOUNTER — TELEPHONE (OUTPATIENT)
Dept: PULMONOLOGY | Facility: CLINIC | Age: 34
End: 2019-01-04

## 2019-01-04 VITALS
OXYGEN SATURATION: 97 % | HEART RATE: 108 BPM | HEIGHT: 72 IN | BODY MASS INDEX: 34 KG/M2 | SYSTOLIC BLOOD PRESSURE: 126 MMHG | WEIGHT: 251 LBS | DIASTOLIC BLOOD PRESSURE: 84 MMHG

## 2019-01-04 DIAGNOSIS — J98.2 PNEUMOMEDIASTINUM: ICD-10-CM

## 2019-01-04 DIAGNOSIS — J98.2 PNEUMOMEDIASTINUM: Primary | ICD-10-CM

## 2019-01-04 DIAGNOSIS — J45.40 MODERATE PERSISTENT ASTHMA WITHOUT COMPLICATION: Chronic | ICD-10-CM

## 2019-01-04 LAB
POST FEV1 FVC: 0.65
POST FEV1: 2.96
POST FVC: 4.53
PRE FEV1 FVC: 65
PRE FEV1: 2.76
PRE FVC: 4.25
PREDICTED FEV1 FVC: 82
PREDICTED FEV1: 4.63
PREDICTED FVC: 5.6

## 2019-01-04 PROCEDURE — 71046 X-RAY EXAM CHEST 2 VIEWS: CPT | Mod: 26,,, | Performed by: RADIOLOGY

## 2019-01-04 PROCEDURE — 99214 PR OFFICE/OUTPT VISIT, EST, LEVL IV, 30-39 MIN: ICD-10-PCS | Mod: 25,S$GLB,, | Performed by: INTERNAL MEDICINE

## 2019-01-04 PROCEDURE — 71046 XR CHEST PA AND LATERAL: ICD-10-PCS | Mod: 26,,, | Performed by: RADIOLOGY

## 2019-01-04 PROCEDURE — 94729 PR C02/MEMBANE DIFFUSE CAPACITY: ICD-10-PCS | Mod: S$GLB,,, | Performed by: INTERNAL MEDICINE

## 2019-01-04 PROCEDURE — 3074F SYST BP LT 130 MM HG: CPT | Mod: CPTII,S$GLB,, | Performed by: INTERNAL MEDICINE

## 2019-01-04 PROCEDURE — 94729 DIFFUSING CAPACITY: CPT | Mod: S$GLB,,, | Performed by: INTERNAL MEDICINE

## 2019-01-04 PROCEDURE — 99999 PR PBB SHADOW E&M-EST. PATIENT-LVL III: CPT | Mod: PBBFAC,,, | Performed by: INTERNAL MEDICINE

## 2019-01-04 PROCEDURE — 99999 PR PBB SHADOW E&M-EST. PATIENT-LVL III: ICD-10-PCS | Mod: PBBFAC,,, | Performed by: INTERNAL MEDICINE

## 2019-01-04 PROCEDURE — 99214 OFFICE O/P EST MOD 30 MIN: CPT | Mod: 25,S$GLB,, | Performed by: INTERNAL MEDICINE

## 2019-01-04 PROCEDURE — 3008F BODY MASS INDEX DOCD: CPT | Mod: CPTII,S$GLB,, | Performed by: INTERNAL MEDICINE

## 2019-01-04 PROCEDURE — 94060 PR EVAL OF BRONCHOSPASM: ICD-10-PCS | Mod: S$GLB,,, | Performed by: INTERNAL MEDICINE

## 2019-01-04 PROCEDURE — 71046 X-RAY EXAM CHEST 2 VIEWS: CPT | Mod: TC,FY

## 2019-01-04 PROCEDURE — 94060 EVALUATION OF WHEEZING: CPT | Mod: S$GLB,,, | Performed by: INTERNAL MEDICINE

## 2019-01-04 PROCEDURE — 3079F DIAST BP 80-89 MM HG: CPT | Mod: CPTII,S$GLB,, | Performed by: INTERNAL MEDICINE

## 2019-01-04 PROCEDURE — 3008F PR BODY MASS INDEX (BMI) DOCUMENTED: ICD-10-PCS | Mod: CPTII,S$GLB,, | Performed by: INTERNAL MEDICINE

## 2019-01-04 PROCEDURE — 3074F PR MOST RECENT SYSTOLIC BLOOD PRESSURE < 130 MM HG: ICD-10-PCS | Mod: CPTII,S$GLB,, | Performed by: INTERNAL MEDICINE

## 2019-01-04 PROCEDURE — 3079F PR MOST RECENT DIASTOLIC BLOOD PRESSURE 80-89 MM HG: ICD-10-PCS | Mod: CPTII,S$GLB,, | Performed by: INTERNAL MEDICINE

## 2019-01-04 NOTE — TELEPHONE ENCOUNTER
Left patient a voicemail at 9:26a to let him know per Dr. Bearden his XR Chest that he had done on this morning 01/04 was clear and if he had any questions or concerns he can return my call at the office.

## 2019-01-04 NOTE — PROGRESS NOTES
Subjective:       Patient ID: Roman Quinn is a 33 y.o. male.    Chief Complaint: Emphysema    33 year old male with asthma who presents for hospital follow up for asthma exacerbation and pneumomediastinum. Patient was discharged on steroids and Breo. Patient states he feels significantly better today.   Albuterol use 1-2 times a week.   No chest pain or SOB.   Symptoms usually worse in fall/winter.          Review of Systems   Constitutional: Negative for activity change and appetite change.   Respiratory: Negative for cough, shortness of breath, wheezing and use of rescue inhaler.        Objective:       Vitals:    01/04/19 0835   BP: 126/84   Pulse: 108   SpO2: 97%   Weight: 113.9 kg (251 lb)   Height: 6' (1.829 m)       Physical Exam   Constitutional: He appears well-developed and well-nourished. No distress.   Pulmonary/Chest: Normal expansion and symmetric chest wall expansion. He has no wheezes. He has no rhonchi.   Skin: He is not diaphoretic.   Nursing note and vitals reviewed.    Personal Diagnostic Review  Pulmonary function tests: FEV1: 2.96  (64 % predicted), FVC:  4.53 (81 % predicted), FEV1/FVC:  65, DLCO: 39.4 (124 % predicted)  No flowsheet data found.      Assessment:       1. Pneumomediastinum        Outpatient Encounter Medications as of 1/4/2019   Medication Sig Dispense Refill    albuterol (PROAIR HFA) 90 mcg/actuation inhaler Inhale 2 puffs into the lungs every 6 (six) hours as needed for Wheezing or Shortness of Breath. Rescue 8 g 4    amLODIPine (NORVASC) 5 MG tablet Take 1 tablet (5 mg total) by mouth once daily. 90 tablet 3    dextromethorphan-guaifenesin  mg (MUCINEX DM)  mg per 12 hr tablet Take 1 tablet by mouth every 12 (twelve) hours.      fluticasone (FLONASE) 50 mcg/actuation nasal spray 2 sprays (100 mcg total) by Each Nare route once daily. 16 g 11    fluticasone-vilanterol (BREO) 100-25 mcg/dose diskus inhaler Inhale 1 puff into the lungs once daily.  Controller 60 each 11    FLUZONE QUAD 5161-7302, PF, 60 mcg (15 mcg x 4)/0.5 mL Syrg       lisinopril (PRINIVIL,ZESTRIL) 20 MG tablet Take 1 tablet (20 mg total) by mouth once daily. 30 tablet 11    simvastatin (ZOCOR) 20 MG tablet Take 1 tablet (20 mg total) by mouth every evening. 90 tablet 3     No facility-administered encounter medications on file as of 1/4/2019.      Orders Placed This Encounter   Procedures    X-Ray Chest PA And Lateral     Standing Status:   Future     Standing Expiration Date:   1/4/2020     Order Specific Question:   May the Radiologist modify the order per protocol to meet the clinical needs of the patient?     Answer:   Yes       Plan:         Pneumomediastinum  Will check CXR for resolution. No symptoms currently.     Moderate persistent asthma  Well controlled. Moderate obstruction on Spirometry.   -Continue Breo 100/25.   -Asthma action plan discussed.   -Patient has peak flow.   -Albuterol PRN.     Follow up in 1 year.     Zaira Bearden MD

## 2019-01-04 NOTE — LETTER
January 4, 2019      Hung Reid MD  [address] [facility] [place] [address]           [facility]  [address]  [place] [address]  Phone: [phone]          Patient: Roman Quinn   MR Number: [id number]   YOB: 1985   Date of Visit: 1/4/2019       Dear Dr. Hung Reid:    Thank you for referring Roman Quinn to me for evaluation. Attached you will find relevant portions of my assessment and plan of care.    If you have questions, please do not hesitate to call me. I look forward to following Roman Quinn along with you.    Sincerely,    Zaira Bearden MD    Enclosure  CC:  No Recipients    If you would like to receive this communication electronically, please contact [email or web address] or [phone] to request more information on [employer] Link access.    For providers and/or their staff who would like to refer a patient to [facility], please contact us through our one-stop-shop provider referral line, Ghanshyam Montelongo, at [phone].    If you feel you have received this communication in error or would no longer like to receive these types of communications, please e-mail [email or web address]

## 2019-01-04 NOTE — ASSESSMENT & PLAN NOTE
Well controlled. Moderate obstruction on Spirometry.   -Continue Breo 100/25.   -Asthma action plan discussed.   -Patient has peak flow.   -Albuterol PRN.

## 2019-02-23 NOTE — SUBJECTIVE & OBJECTIVE
Interval History: This morning Mr. Quinn denies new complaints. No events overnight. No hoarseness. SOB, dysphagia, odynophagia. He admits to mild tenderness to palpation of the left side of his neck.     Objective:     Vital Signs (Most Recent):  Temp: 98 °F (36.7 °C) (10/18/18 0752)  Pulse: (!) 122 (10/18/18 0800)  Resp: 18 (10/18/18 0757)  BP: 132/86 (10/18/18 0752)  SpO2: 98 % (10/18/18 0757) Vital Signs (24h Range):  Temp:  [97.5 °F (36.4 °C)-98.3 °F (36.8 °C)] 98 °F (36.7 °C)  Pulse:  [] 122  Resp:  [13-20] 18  SpO2:  [92 %-98 %] 98 %  BP: (122-157)/() 132/86     Weight: 109.8 kg (242 lb)  Body mass index is 32.82 kg/m².      Intake/Output Summary (Last 24 hours) at 10/18/2018 1046  Last data filed at 10/18/2018 0643  Gross per 24 hour   Intake 1480 ml   Output --   Net 1480 ml       Physical Exam   Constitutional: He appears well-developed and well-nourished.   HENT:   Head: Normocephalic and atraumatic.   Eyes: No scleral icterus.   Neck: Neck supple. No tracheal deviation present.   Minimal palpable supraclavicular crepitus   Cardiovascular: Normal rate and regular rhythm.   No murmur heard.  Pulmonary/Chest: Effort normal.   Decreased BS in left base, no wheezing or prolonged expiration. No rhales.   Abdominal: Soft. He exhibits no distension.   Musculoskeletal: He exhibits no edema or deformity.   Lymphadenopathy:     He has no cervical adenopathy.   Neurological: He is alert.   Skin: Skin is warm and dry. No rash noted.   Psychiatric: He has a normal mood and affect. His behavior is normal.   Vitals reviewed.      Lines/Drains/Airways     Peripheral Intravenous Line                 Peripheral IV - Single Lumen 10/17/18 1057 Right Antecubital less than 1 day                Significant Labs:    CBC/Anemia Profile:  Recent Labs   Lab 10/17/18  1057 10/18/18  0420   WBC 11.68 12.43   HGB 15.4 14.6   HCT 46.2 44.2    270   MCV 89 91   RDW 13.0 13.4        Chemistries:  Recent Labs   Lab  10/17/18  1057 10/18/18  0421    139   K 4.8 4.7    105   CO2 27 25   BUN 17 17   CREATININE 0.9 0.9   CALCIUM 10.0 9.5   MG  --  2.4   PHOS  --  2.3*       All pertinent labs within the past 24 hours have been reviewed.    Significant Imaging:  CXR: I have reviewed all pertinent results/findings within the past 24 hours and my personal findings are:  Repeat CXR with stable pneumomediastinum, no pneumothorax. No significant increase in air traveling into the neck or supraclavicular tissues.   Yes

## 2019-05-29 RX ORDER — SIMVASTATIN 20 MG/1
20 TABLET, FILM COATED ORAL NIGHTLY
Qty: 90 TABLET | Refills: 0 | Status: SHIPPED | OUTPATIENT
Start: 2019-05-29 | End: 2019-09-11 | Stop reason: SDUPTHER

## 2019-06-20 ENCOUNTER — TELEPHONE (OUTPATIENT)
Dept: INTERNAL MEDICINE | Facility: CLINIC | Age: 34
End: 2019-06-20

## 2019-06-20 DIAGNOSIS — E78.5 HYPERLIPIDEMIA, UNSPECIFIED HYPERLIPIDEMIA TYPE: Chronic | ICD-10-CM

## 2019-06-20 DIAGNOSIS — Z00.00 WELL ADULT EXAM: Primary | ICD-10-CM

## 2019-06-20 DIAGNOSIS — R73.01 IMPAIRED FASTING GLUCOSE: ICD-10-CM

## 2019-06-20 NOTE — TELEPHONE ENCOUNTER
Spoke with pt re: needing an annual physical appt.   Made appt with pt. Mailed appt time letter to pt    Please order labs to be linked to annual physical appt

## 2019-09-06 ENCOUNTER — LAB VISIT (OUTPATIENT)
Dept: LAB | Facility: HOSPITAL | Age: 34
End: 2019-09-06
Attending: FAMILY MEDICINE
Payer: COMMERCIAL

## 2019-09-06 DIAGNOSIS — Z00.00 WELL ADULT EXAM: ICD-10-CM

## 2019-09-06 DIAGNOSIS — E78.5 HYPERLIPIDEMIA, UNSPECIFIED HYPERLIPIDEMIA TYPE: Chronic | ICD-10-CM

## 2019-09-06 DIAGNOSIS — R73.01 IMPAIRED FASTING GLUCOSE: ICD-10-CM

## 2019-09-06 LAB
25(OH)D3+25(OH)D2 SERPL-MCNC: 27 NG/ML (ref 30–96)
ALBUMIN SERPL BCP-MCNC: 4.3 G/DL (ref 3.5–5.2)
ALP SERPL-CCNC: 59 U/L (ref 55–135)
ALT SERPL W/O P-5'-P-CCNC: 30 U/L (ref 10–44)
ANION GAP SERPL CALC-SCNC: 7 MMOL/L (ref 8–16)
AST SERPL-CCNC: 33 U/L (ref 10–40)
BASOPHILS # BLD AUTO: 0.05 K/UL (ref 0–0.2)
BASOPHILS NFR BLD: 0.9 % (ref 0–1.9)
BILIRUB SERPL-MCNC: 0.5 MG/DL (ref 0.1–1)
BUN SERPL-MCNC: 19 MG/DL (ref 6–20)
CALCIUM SERPL-MCNC: 9.5 MG/DL (ref 8.7–10.5)
CHLORIDE SERPL-SCNC: 103 MMOL/L (ref 95–110)
CHOLEST SERPL-MCNC: 179 MG/DL (ref 120–199)
CHOLEST/HDLC SERPL: 3.6 {RATIO} (ref 2–5)
CO2 SERPL-SCNC: 28 MMOL/L (ref 23–29)
CREAT SERPL-MCNC: 1 MG/DL (ref 0.5–1.4)
DIFFERENTIAL METHOD: ABNORMAL
EOSINOPHIL # BLD AUTO: 0.2 K/UL (ref 0–0.5)
EOSINOPHIL NFR BLD: 3.6 % (ref 0–8)
ERYTHROCYTE [DISTWIDTH] IN BLOOD BY AUTOMATED COUNT: 12.6 % (ref 11.5–14.5)
EST. GFR  (AFRICAN AMERICAN): >60 ML/MIN/1.73 M^2
EST. GFR  (NON AFRICAN AMERICAN): >60 ML/MIN/1.73 M^2
ESTIMATED AVG GLUCOSE: 108 MG/DL (ref 68–131)
GLUCOSE SERPL-MCNC: 109 MG/DL (ref 70–110)
HBA1C MFR BLD HPLC: 5.4 % (ref 4–5.6)
HCT VFR BLD AUTO: 47.4 % (ref 40–54)
HDLC SERPL-MCNC: 50 MG/DL (ref 40–75)
HDLC SERPL: 27.9 % (ref 20–50)
HGB BLD-MCNC: 15 G/DL (ref 14–18)
IMM GRANULOCYTES # BLD AUTO: 0.01 K/UL (ref 0–0.04)
IMM GRANULOCYTES NFR BLD AUTO: 0.2 % (ref 0–0.5)
LDLC SERPL CALC-MCNC: 114.8 MG/DL (ref 63–159)
LYMPHOCYTES # BLD AUTO: 2.3 K/UL (ref 1–4.8)
LYMPHOCYTES NFR BLD: 39.6 % (ref 18–48)
MCH RBC QN AUTO: 29.6 PG (ref 27–31)
MCHC RBC AUTO-ENTMCNC: 31.6 G/DL (ref 32–36)
MCV RBC AUTO: 94 FL (ref 82–98)
MONOCYTES # BLD AUTO: 0.7 K/UL (ref 0.3–1)
MONOCYTES NFR BLD: 12 % (ref 4–15)
NEUTROPHILS # BLD AUTO: 2.5 K/UL (ref 1.8–7.7)
NEUTROPHILS NFR BLD: 43.7 % (ref 38–73)
NONHDLC SERPL-MCNC: 129 MG/DL
NRBC BLD-RTO: 0 /100 WBC
PLATELET # BLD AUTO: 277 K/UL (ref 150–350)
PMV BLD AUTO: 10.2 FL (ref 9.2–12.9)
POTASSIUM SERPL-SCNC: 4.8 MMOL/L (ref 3.5–5.1)
PROT SERPL-MCNC: 7.5 G/DL (ref 6–8.4)
RBC # BLD AUTO: 5.06 M/UL (ref 4.6–6.2)
SODIUM SERPL-SCNC: 138 MMOL/L (ref 136–145)
T4 FREE SERPL-MCNC: 1.06 NG/DL (ref 0.71–1.51)
TRIGL SERPL-MCNC: 71 MG/DL (ref 30–150)
TSH SERPL DL<=0.005 MIU/L-ACNC: 1.01 UIU/ML (ref 0.4–4)
WBC # BLD AUTO: 5.81 K/UL (ref 3.9–12.7)

## 2019-09-06 PROCEDURE — 80053 COMPREHEN METABOLIC PANEL: CPT

## 2019-09-06 PROCEDURE — 36415 COLL VENOUS BLD VENIPUNCTURE: CPT | Mod: PO

## 2019-09-06 PROCEDURE — 85025 COMPLETE CBC W/AUTO DIFF WBC: CPT

## 2019-09-06 PROCEDURE — 83036 HEMOGLOBIN GLYCOSYLATED A1C: CPT

## 2019-09-06 PROCEDURE — 80061 LIPID PANEL: CPT

## 2019-09-06 PROCEDURE — 84443 ASSAY THYROID STIM HORMONE: CPT

## 2019-09-06 PROCEDURE — 84439 ASSAY OF FREE THYROXINE: CPT

## 2019-09-06 PROCEDURE — 82306 VITAMIN D 25 HYDROXY: CPT

## 2019-09-11 ENCOUNTER — OFFICE VISIT (OUTPATIENT)
Dept: INTERNAL MEDICINE | Facility: CLINIC | Age: 34
End: 2019-09-11
Payer: COMMERCIAL

## 2019-09-11 VITALS
SYSTOLIC BLOOD PRESSURE: 122 MMHG | HEIGHT: 72 IN | WEIGHT: 252.44 LBS | TEMPERATURE: 99 F | OXYGEN SATURATION: 97 % | BODY MASS INDEX: 34.19 KG/M2 | DIASTOLIC BLOOD PRESSURE: 84 MMHG | HEART RATE: 97 BPM | RESPIRATION RATE: 15 BRPM

## 2019-09-11 DIAGNOSIS — I10 ESSENTIAL HYPERTENSION: Chronic | ICD-10-CM

## 2019-09-11 DIAGNOSIS — R73.01 IMPAIRED FASTING GLUCOSE: ICD-10-CM

## 2019-09-11 DIAGNOSIS — E78.5 HYPERLIPIDEMIA, UNSPECIFIED HYPERLIPIDEMIA TYPE: Chronic | ICD-10-CM

## 2019-09-11 DIAGNOSIS — Z23 NEED FOR 23-POLYVALENT PNEUMOCOCCAL POLYSACCHARIDE VACCINE: ICD-10-CM

## 2019-09-11 DIAGNOSIS — J45.40 MODERATE PERSISTENT ASTHMA WITHOUT COMPLICATION: Chronic | ICD-10-CM

## 2019-09-11 DIAGNOSIS — Z00.00 WELL ADULT EXAM: Primary | ICD-10-CM

## 2019-09-11 DIAGNOSIS — E66.9 OBESITY (BMI 30.0-34.9): Chronic | ICD-10-CM

## 2019-09-11 PROCEDURE — 3074F SYST BP LT 130 MM HG: CPT | Mod: CPTII,S$GLB,, | Performed by: FAMILY MEDICINE

## 2019-09-11 PROCEDURE — 90471 IMMUNIZATION ADMIN: CPT | Mod: S$GLB,,, | Performed by: FAMILY MEDICINE

## 2019-09-11 PROCEDURE — 99999 PR PBB SHADOW E&M-EST. PATIENT-LVL IV: CPT | Mod: PBBFAC,,, | Performed by: FAMILY MEDICINE

## 2019-09-11 PROCEDURE — 3074F PR MOST RECENT SYSTOLIC BLOOD PRESSURE < 130 MM HG: ICD-10-PCS | Mod: CPTII,S$GLB,, | Performed by: FAMILY MEDICINE

## 2019-09-11 PROCEDURE — 99395 PREV VISIT EST AGE 18-39: CPT | Mod: 25,S$GLB,, | Performed by: FAMILY MEDICINE

## 2019-09-11 PROCEDURE — 3079F DIAST BP 80-89 MM HG: CPT | Mod: CPTII,S$GLB,, | Performed by: FAMILY MEDICINE

## 2019-09-11 PROCEDURE — 3079F PR MOST RECENT DIASTOLIC BLOOD PRESSURE 80-89 MM HG: ICD-10-PCS | Mod: CPTII,S$GLB,, | Performed by: FAMILY MEDICINE

## 2019-09-11 PROCEDURE — 90732 PNEUMOCOCCAL POLYSACCHARIDE VACCINE 23-VALENT =>2YO SQ IM: ICD-10-PCS | Mod: S$GLB,,, | Performed by: FAMILY MEDICINE

## 2019-09-11 PROCEDURE — 99999 PR PBB SHADOW E&M-EST. PATIENT-LVL IV: ICD-10-PCS | Mod: PBBFAC,,, | Performed by: FAMILY MEDICINE

## 2019-09-11 PROCEDURE — 90471 PNEUMOCOCCAL POLYSACCHARIDE VACCINE 23-VALENT =>2YO SQ IM: ICD-10-PCS | Mod: S$GLB,,, | Performed by: FAMILY MEDICINE

## 2019-09-11 PROCEDURE — 90732 PPSV23 VACC 2 YRS+ SUBQ/IM: CPT | Mod: S$GLB,,, | Performed by: FAMILY MEDICINE

## 2019-09-11 PROCEDURE — 99395 PR PREVENTIVE VISIT,EST,18-39: ICD-10-PCS | Mod: 25,S$GLB,, | Performed by: FAMILY MEDICINE

## 2019-09-11 RX ORDER — LISINOPRIL 20 MG/1
20 TABLET ORAL DAILY
Qty: 90 TABLET | Refills: 3 | Status: SHIPPED | OUTPATIENT
Start: 2019-09-11 | End: 2020-09-30

## 2019-09-11 RX ORDER — SIMVASTATIN 20 MG/1
20 TABLET, FILM COATED ORAL NIGHTLY
Qty: 90 TABLET | Refills: 3 | Status: SHIPPED | OUTPATIENT
Start: 2019-09-11 | End: 2020-12-17

## 2019-09-11 RX ORDER — FLUTICASONE PROPIONATE 50 MCG
2 SPRAY, SUSPENSION (ML) NASAL DAILY
Qty: 16 G | Refills: 11 | Status: SHIPPED | OUTPATIENT
Start: 2019-09-11 | End: 2020-11-06

## 2019-09-11 RX ORDER — ALBUTEROL SULFATE 90 UG/1
2 AEROSOL, METERED RESPIRATORY (INHALATION) EVERY 6 HOURS PRN
Qty: 18 G | Refills: 11 | Status: SHIPPED | OUTPATIENT
Start: 2019-09-11 | End: 2020-10-15 | Stop reason: SDUPTHER

## 2019-09-11 RX ORDER — FLUTICASONE FUROATE AND VILANTEROL 100; 25 UG/1; UG/1
1 POWDER RESPIRATORY (INHALATION) DAILY
Qty: 60 EACH | Refills: 11 | Status: SHIPPED | OUTPATIENT
Start: 2019-09-11 | End: 2020-10-09

## 2019-09-11 RX ORDER — AMLODIPINE BESYLATE 5 MG/1
5 TABLET ORAL DAILY
Qty: 90 TABLET | Refills: 3 | Status: SHIPPED | OUTPATIENT
Start: 2019-09-11 | End: 2020-10-06

## 2019-09-11 NOTE — PROGRESS NOTES
Subjective:       Patient ID: Roman Quinn is a 34 y.o. male.    Chief Complaint: Annual Exam    HPI 34-year-old white male presents to clinic today for annual physical exam.  He continues to have well-controlled hypertension on amlodipine 5 mg daily and lisinopril 20 mg daily.  Hyperlipidemia remains well controlled on simvastatin 20 mg daily.  He continues to have mild persistent asthma which remains controlled on Breo daily.  He has a past medical history of pneumonia with resultant pneumomediastinum has fully resolved.  He has a past surgical history of hernia repair.  He has a family history of his mother having diabetes.  Secondary to his previous history of pneumonia, I recommend Pneumovax vaccination.  Risks and benefits have been discussed and patient wishes to proceed with vaccination.  Review of Systems   Constitutional: Negative for activity change, appetite change, chills, fatigue, fever and unexpected weight change.   HENT: Negative for congestion, ear pain, hearing loss, postnasal drip, rhinorrhea, sinus pressure, sore throat, tinnitus and trouble swallowing.    Eyes: Negative for discharge, redness, itching and visual disturbance.   Respiratory: Negative for cough, chest tightness, shortness of breath and wheezing.    Cardiovascular: Negative for chest pain and palpitations.   Gastrointestinal: Negative for abdominal pain, blood in stool, constipation, diarrhea, nausea and vomiting.   Endocrine: Negative for polydipsia and polyuria.   Genitourinary: Negative for decreased urine volume, difficulty urinating, dysuria, frequency, hematuria and urgency.   Musculoskeletal: Negative for arthralgias, back pain, joint swelling, myalgias, neck pain and neck stiffness.   Skin: Negative for rash.   Neurological: Negative for dizziness, weakness, light-headedness and headaches.   Psychiatric/Behavioral: Negative.  Negative for confusion and dysphoric mood.       Objective:      Physical Exam    Constitutional: He is oriented to person, place, and time. He appears well-developed and well-nourished. No distress.   HENT:   Head: Normocephalic and atraumatic.   Right Ear: External ear normal.   Left Ear: External ear normal.   Nose: Nose normal.   Mouth/Throat: Oropharynx is clear and moist. No oropharyngeal exudate.   Eyes: Pupils are equal, round, and reactive to light. Conjunctivae and EOM are normal. Right eye exhibits no discharge. Left eye exhibits no discharge. No scleral icterus.   Neck: Normal range of motion. Neck supple. No JVD present. No tracheal deviation present. No thyromegaly present.   Cardiovascular: Normal rate, regular rhythm, normal heart sounds and intact distal pulses. Exam reveals no gallop and no friction rub.   No murmur heard.  Pulmonary/Chest: Effort normal and breath sounds normal. No stridor. No respiratory distress. He has no wheezes. He has no rales.   Abdominal: Soft. Bowel sounds are normal. He exhibits no distension and no mass. There is no tenderness. There is no rebound and no guarding.   Musculoskeletal: Normal range of motion. He exhibits no edema or tenderness.   Lymphadenopathy:     He has no cervical adenopathy.   Neurological: He is alert and oriented to person, place, and time.   Skin: Skin is warm and dry. No rash noted. He is not diaphoretic. No erythema. No pallor.   Psychiatric: He has a normal mood and affect. His behavior is normal. Judgment and thought content normal.   Nursing note and vitals reviewed.      Assessment:       1. Well adult exam    2. Essential hypertension    3. Hyperlipidemia, unspecified hyperlipidemia type    4. Impaired fasting glucose    5. Moderate persistent asthma without complication    6. Obesity (BMI 30.0-34.9)    7. Need for 23-polyvalent pneumococcal polysaccharide vaccine        Plan:       1.  Labs have been reviewed and are overall within normal limits.  2.  Continue amlodipine 5 mg daily and lisinopril 20 mg daily.   Hypertension is well controlled.  3.  Continue simvastatin 20 mg daily.  Hyperlipidemia is well controlled.  4.  Continue diet and exercise.  Impaired fasting glucose is well controlled with A1c of 5.4.  5.  Pneumovax given.  6.  Return to clinic as needed or in 1 year for annual exam.

## 2020-01-30 ENCOUNTER — OFFICE VISIT (OUTPATIENT)
Dept: INTERNAL MEDICINE | Facility: CLINIC | Age: 35
End: 2020-01-30
Payer: COMMERCIAL

## 2020-01-30 VITALS
HEART RATE: 94 BPM | DIASTOLIC BLOOD PRESSURE: 80 MMHG | SYSTOLIC BLOOD PRESSURE: 128 MMHG | RESPIRATION RATE: 16 BRPM | WEIGHT: 259.69 LBS | BODY MASS INDEX: 35.17 KG/M2 | TEMPERATURE: 98 F | HEIGHT: 72 IN

## 2020-01-30 DIAGNOSIS — J45.41 MODERATE PERSISTENT ASTHMA WITH EXACERBATION: Primary | ICD-10-CM

## 2020-01-30 PROCEDURE — 3008F BODY MASS INDEX DOCD: CPT | Mod: CPTII,S$GLB,, | Performed by: FAMILY MEDICINE

## 2020-01-30 PROCEDURE — 3079F DIAST BP 80-89 MM HG: CPT | Mod: CPTII,S$GLB,, | Performed by: FAMILY MEDICINE

## 2020-01-30 PROCEDURE — 3074F SYST BP LT 130 MM HG: CPT | Mod: CPTII,S$GLB,, | Performed by: FAMILY MEDICINE

## 2020-01-30 PROCEDURE — 99999 PR PBB SHADOW E&M-EST. PATIENT-LVL III: ICD-10-PCS | Mod: PBBFAC,,, | Performed by: FAMILY MEDICINE

## 2020-01-30 PROCEDURE — 99214 PR OFFICE/OUTPT VISIT, EST, LEVL IV, 30-39 MIN: ICD-10-PCS | Mod: S$GLB,,, | Performed by: FAMILY MEDICINE

## 2020-01-30 PROCEDURE — 99214 OFFICE O/P EST MOD 30 MIN: CPT | Mod: S$GLB,,, | Performed by: FAMILY MEDICINE

## 2020-01-30 PROCEDURE — 99999 PR PBB SHADOW E&M-EST. PATIENT-LVL III: CPT | Mod: PBBFAC,,, | Performed by: FAMILY MEDICINE

## 2020-01-30 PROCEDURE — 3079F PR MOST RECENT DIASTOLIC BLOOD PRESSURE 80-89 MM HG: ICD-10-PCS | Mod: CPTII,S$GLB,, | Performed by: FAMILY MEDICINE

## 2020-01-30 PROCEDURE — 3074F PR MOST RECENT SYSTOLIC BLOOD PRESSURE < 130 MM HG: ICD-10-PCS | Mod: CPTII,S$GLB,, | Performed by: FAMILY MEDICINE

## 2020-01-30 PROCEDURE — 3008F PR BODY MASS INDEX (BMI) DOCUMENTED: ICD-10-PCS | Mod: CPTII,S$GLB,, | Performed by: FAMILY MEDICINE

## 2020-01-30 RX ORDER — BENZONATATE 200 MG/1
200 CAPSULE ORAL 3 TIMES DAILY PRN
Qty: 30 CAPSULE | Refills: 0 | Status: SHIPPED | OUTPATIENT
Start: 2020-01-30 | End: 2020-02-09

## 2020-01-30 RX ORDER — METHYLPREDNISOLONE 4 MG/1
TABLET ORAL
Qty: 1 PACKAGE | Refills: 0 | Status: SHIPPED | OUTPATIENT
Start: 2020-01-30 | End: 2020-02-20

## 2020-01-30 RX ORDER — AMOXICILLIN AND CLAVULANATE POTASSIUM 875; 125 MG/1; MG/1
1 TABLET, FILM COATED ORAL 2 TIMES DAILY
Qty: 20 TABLET | Refills: 0 | Status: SHIPPED | OUTPATIENT
Start: 2020-01-30 | End: 2020-02-09

## 2020-01-30 NOTE — PROGRESS NOTES
Subjective:       Patient ID: Roman Quinn is a 34 y.o. male.    Chief Complaint: Chest Congestion and Cough (slight blood in mucus)    HPI 34-year-old white male with moderate persistent asthma presents to clinic today secondary to a complaint of worsening nasal congestion, postnasal drip, sinus pressure, cough productive of clear sputum, chest congestion, cough and due shortness of breath, and wheezing for the past 2 weeks.  He takes Breo in Flonase nasal spray daily but has noted that he has had to use his albuterol inhaler as much as 6 times daily to assist with the symptoms.  Review of Systems   Constitutional: Negative for appetite change, chills, fatigue and fever.   HENT: Positive for congestion, postnasal drip and sinus pressure. Negative for ear pain, hearing loss, rhinorrhea, sore throat and tinnitus.    Eyes: Negative for redness, itching and visual disturbance.   Respiratory: Positive for cough (Clear ), chest tightness, shortness of breath and wheezing.    Cardiovascular: Negative for chest pain and palpitations.   Gastrointestinal: Negative for abdominal pain, constipation, diarrhea, nausea and vomiting.   Genitourinary: Negative for decreased urine volume, difficulty urinating, dysuria, frequency, hematuria and urgency.   Musculoskeletal: Negative for back pain, myalgias, neck pain and neck stiffness.   Skin: Negative for rash.   Allergic/Immunologic: Positive for environmental allergies.   Neurological: Negative for dizziness, light-headedness and headaches.   Psychiatric/Behavioral: Negative.        Objective:      Physical Exam   Constitutional: He is oriented to person, place, and time. He appears well-developed and well-nourished. No distress.   HENT:   Head: Normocephalic and atraumatic.   Right Ear: External ear normal. A middle ear effusion is present.   Left Ear: External ear normal. A middle ear effusion is present.   Nose: Mucosal edema and rhinorrhea present. No nose lacerations,  sinus tenderness, nasal deformity, septal deviation or nasal septal hematoma. No epistaxis.  No foreign bodies. Right sinus exhibits no maxillary sinus tenderness and no frontal sinus tenderness. Left sinus exhibits no maxillary sinus tenderness and no frontal sinus tenderness.   Mouth/Throat: Oropharynx is clear and moist. No oropharyngeal exudate.   Eyes: Pupils are equal, round, and reactive to light. Conjunctivae and EOM are normal. Right eye exhibits no discharge. Left eye exhibits no discharge. No scleral icterus.   Neck: Normal range of motion. Neck supple. No JVD present. No tracheal deviation present. No thyromegaly present.   Cardiovascular: Normal rate, regular rhythm, normal heart sounds and intact distal pulses. Exam reveals no gallop and no friction rub.   No murmur heard.  Pulmonary/Chest: Effort normal and breath sounds normal. No stridor. No respiratory distress. He has no wheezes. He has no rales.   Abdominal: Soft. Bowel sounds are normal. He exhibits no distension and no mass. There is no tenderness. There is no rebound and no guarding.   Musculoskeletal: Normal range of motion. He exhibits no edema or tenderness.   Lymphadenopathy:     He has no cervical adenopathy.   Neurological: He is alert and oriented to person, place, and time.   Skin: Skin is warm and dry. No rash noted. He is not diaphoretic. No erythema. No pallor.   Psychiatric: He has a normal mood and affect. His behavior is normal. Judgment and thought content normal.   Nursing note and vitals reviewed.      Assessment:       1. Moderate persistent asthma with exacerbation        Plan:       Moderate persistent asthma with exacerbation  -     methylPREDNISolone (MEDROL DOSEPACK) 4 mg tablet; use as directed  Dispense: 1 Package; Refill: 0  -     amoxicillin-clavulanate 875-125mg (AUGMENTIN) 875-125 mg per tablet; Take 1 tablet by mouth 2 (two) times daily. for 10 days  Dispense: 20 tablet; Refill: 0  -     benzonatate (TESSALON) 200  MG capsule; Take 1 capsule (200 mg total) by mouth 3 (three) times daily as needed for Cough.  Dispense: 30 capsule; Refill: 0      Continue Breo daily.  Flonase nasal spray 2 sprays per nostril daily.  Over-the-counter Claritin nightly.  Return to clinic as needed if symptoms persist or worsen.

## 2020-04-05 ENCOUNTER — PATIENT MESSAGE (OUTPATIENT)
Dept: INTERNAL MEDICINE | Facility: CLINIC | Age: 35
End: 2020-04-05

## 2020-04-06 ENCOUNTER — OFFICE VISIT (OUTPATIENT)
Dept: INTERNAL MEDICINE | Facility: CLINIC | Age: 35
End: 2020-04-06
Payer: COMMERCIAL

## 2020-04-06 ENCOUNTER — PATIENT MESSAGE (OUTPATIENT)
Dept: INTERNAL MEDICINE | Facility: CLINIC | Age: 35
End: 2020-04-06

## 2020-04-06 DIAGNOSIS — S80.861A INSECT BITE OF RIGHT LOWER LEG, INITIAL ENCOUNTER: Primary | ICD-10-CM
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
## 2020-04-06 PROCEDURE — 99213 OFFICE O/P EST LOW 20 MIN: CPT | Mod: 95,,, | Performed by: FAMILY MEDICINE

## 2020-04-06 PROCEDURE — 99213 PR OFFICE/OUTPT VISIT, EST, LEVL III, 20-29 MIN: ICD-10-PCS | Mod: 95,,, | Performed by: FAMILY MEDICINE

## 2020-04-06 NOTE — PROGRESS NOTES
Subjective:       Patient ID: Roman Quinn is a 34 y.o. male.    Chief Complaint: Rash    The patient location is: Home  The chief complaint leading to consultation is:  Rash  Visit type: Virtual visit with synchronous audio and video  Total time spent with patient: 5 minutes  Each patient to whom he or she provides medical services by telemedicine is:  (1) informed of the relationship between the physician and patient and the respective role of any other health care provider with respect to management of the patient; and (2) notified that he or she may decline to receive medical services by telemedicine and may withdraw from such care at any time.    Notes:  34-year-old white male is seen today via video visit secondary to a complaint of an itchy rash to the right lower extremity noted yesterday.  He states that the day prior he did spend most events day outside and suspects a possible insect bite.  On Sunday he began noticing itching to his lower extremity but became concerned when he felt what he believed was a swollen vein.  He has not attempted any treatment but does note that today the swelling is improved but he continues to have mild itching to the lower extremity.    Review of Systems   Constitutional: Negative for appetite change, chills, fatigue and fever.   HENT: Negative for congestion, ear pain, hearing loss, postnasal drip, rhinorrhea, sinus pressure, sore throat and tinnitus.    Eyes: Negative for redness, itching and visual disturbance.   Respiratory: Negative for cough, chest tightness and shortness of breath.    Cardiovascular: Negative for chest pain and palpitations.   Gastrointestinal: Negative for abdominal pain, constipation, diarrhea, nausea and vomiting.   Genitourinary: Negative for decreased urine volume, difficulty urinating, dysuria, frequency, hematuria and urgency.   Musculoskeletal: Negative for back pain, myalgias, neck pain and neck stiffness.   Skin: Positive for rash.    Neurological: Negative for dizziness, light-headedness and headaches.   Psychiatric/Behavioral: Negative.        Objective:      Exam is performed via video.  The right lower extremity is visualized and there is mild erythema to the anterior aspect of the lower leg with a small bruise.  Mild excoriation is also noted to the area.  Assessment:       1. Insect bite of right lower leg, initial encounter        Plan:       1.  Visualization appears that there is a slight bruise and erythema to the area with a small insect bite.  Recommend cool compresses to assist with itching.  2.  Recommend use of over-the-counter hydrocortisone cream to assist with itching.  3.  Message or repeat video visit if symptoms persist or worsen.

## 2020-09-28 ENCOUNTER — TELEPHONE (OUTPATIENT)
Dept: INTERNAL MEDICINE | Facility: CLINIC | Age: 35
End: 2020-09-28

## 2020-09-28 DIAGNOSIS — Z00.00 WELL ADULT EXAM: Primary | ICD-10-CM

## 2020-09-28 DIAGNOSIS — E78.5 HYPERLIPIDEMIA, UNSPECIFIED HYPERLIPIDEMIA TYPE: Chronic | ICD-10-CM

## 2020-10-12 ENCOUNTER — LAB VISIT (OUTPATIENT)
Dept: LAB | Facility: HOSPITAL | Age: 35
End: 2020-10-12
Attending: FAMILY MEDICINE
Payer: COMMERCIAL

## 2020-10-12 DIAGNOSIS — E78.5 HYPERLIPIDEMIA, UNSPECIFIED HYPERLIPIDEMIA TYPE: Chronic | ICD-10-CM

## 2020-10-12 DIAGNOSIS — Z00.00 WELL ADULT EXAM: ICD-10-CM

## 2020-10-12 LAB
BILIRUB UR QL STRIP: NEGATIVE
CLARITY UR REFRACT.AUTO: CLEAR
COLOR UR AUTO: NORMAL
GLUCOSE UR QL STRIP: NEGATIVE
HGB UR QL STRIP: NEGATIVE
KETONES UR QL STRIP: NEGATIVE
LEUKOCYTE ESTERASE UR QL STRIP: NEGATIVE
MICROSCOPIC COMMENT: NORMAL
NITRITE UR QL STRIP: NEGATIVE
PH UR STRIP: 7 [PH] (ref 5–8)
PROT UR QL STRIP: NEGATIVE
SP GR UR STRIP: 1.01 (ref 1–1.03)
URN SPEC COLLECT METH UR: NORMAL

## 2020-10-12 PROCEDURE — 81001 URINALYSIS AUTO W/SCOPE: CPT

## 2020-10-12 PROCEDURE — 36415 COLL VENOUS BLD VENIPUNCTURE: CPT | Mod: PO

## 2020-10-12 PROCEDURE — 80053 COMPREHEN METABOLIC PANEL: CPT

## 2020-10-12 PROCEDURE — 83036 HEMOGLOBIN GLYCOSYLATED A1C: CPT

## 2020-10-12 PROCEDURE — 85025 COMPLETE CBC W/AUTO DIFF WBC: CPT

## 2020-10-12 PROCEDURE — 84439 ASSAY OF FREE THYROXINE: CPT

## 2020-10-12 PROCEDURE — 84443 ASSAY THYROID STIM HORMONE: CPT

## 2020-10-12 PROCEDURE — 82306 VITAMIN D 25 HYDROXY: CPT

## 2020-10-12 PROCEDURE — 80061 LIPID PANEL: CPT

## 2020-10-13 LAB
25(OH)D3+25(OH)D2 SERPL-MCNC: 25 NG/ML (ref 30–96)
ALBUMIN SERPL BCP-MCNC: 4.4 G/DL (ref 3.5–5.2)
ALP SERPL-CCNC: 61 U/L (ref 55–135)
ALT SERPL W/O P-5'-P-CCNC: 18 U/L (ref 10–44)
ANION GAP SERPL CALC-SCNC: 9 MMOL/L (ref 8–16)
AST SERPL-CCNC: 29 U/L (ref 10–40)
BASOPHILS # BLD AUTO: 0.05 K/UL (ref 0–0.2)
BASOPHILS NFR BLD: 0.8 % (ref 0–1.9)
BILIRUB SERPL-MCNC: 0.5 MG/DL (ref 0.1–1)
BUN SERPL-MCNC: 12 MG/DL (ref 6–20)
CALCIUM SERPL-MCNC: 9.2 MG/DL (ref 8.7–10.5)
CHLORIDE SERPL-SCNC: 102 MMOL/L (ref 95–110)
CHOLEST SERPL-MCNC: 185 MG/DL (ref 120–199)
CHOLEST/HDLC SERPL: 3.6 {RATIO} (ref 2–5)
CO2 SERPL-SCNC: 27 MMOL/L (ref 23–29)
CREAT SERPL-MCNC: 0.9 MG/DL (ref 0.5–1.4)
DIFFERENTIAL METHOD: ABNORMAL
EOSINOPHIL # BLD AUTO: 0.2 K/UL (ref 0–0.5)
EOSINOPHIL NFR BLD: 3.6 % (ref 0–8)
ERYTHROCYTE [DISTWIDTH] IN BLOOD BY AUTOMATED COUNT: 12.7 % (ref 11.5–14.5)
EST. GFR  (AFRICAN AMERICAN): >60 ML/MIN/1.73 M^2
EST. GFR  (NON AFRICAN AMERICAN): >60 ML/MIN/1.73 M^2
ESTIMATED AVG GLUCOSE: 105 MG/DL (ref 68–131)
GLUCOSE SERPL-MCNC: 84 MG/DL (ref 70–110)
HBA1C MFR BLD HPLC: 5.3 % (ref 4–5.6)
HCT VFR BLD AUTO: 49.1 % (ref 40–54)
HDLC SERPL-MCNC: 51 MG/DL (ref 40–75)
HDLC SERPL: 27.6 % (ref 20–50)
HGB BLD-MCNC: 15.5 G/DL (ref 14–18)
IMM GRANULOCYTES # BLD AUTO: 0.01 K/UL (ref 0–0.04)
IMM GRANULOCYTES NFR BLD AUTO: 0.2 % (ref 0–0.5)
LDLC SERPL CALC-MCNC: 109.4 MG/DL (ref 63–159)
LYMPHOCYTES # BLD AUTO: 2 K/UL (ref 1–4.8)
LYMPHOCYTES NFR BLD: 32.3 % (ref 18–48)
MCH RBC QN AUTO: 30 PG (ref 27–31)
MCHC RBC AUTO-ENTMCNC: 31.6 G/DL (ref 32–36)
MCV RBC AUTO: 95 FL (ref 82–98)
MONOCYTES # BLD AUTO: 0.5 K/UL (ref 0.3–1)
MONOCYTES NFR BLD: 8.2 % (ref 4–15)
NEUTROPHILS # BLD AUTO: 3.4 K/UL (ref 1.8–7.7)
NEUTROPHILS NFR BLD: 54.9 % (ref 38–73)
NONHDLC SERPL-MCNC: 134 MG/DL
NRBC BLD-RTO: 0 /100 WBC
PLATELET # BLD AUTO: 276 K/UL (ref 150–350)
PMV BLD AUTO: 10.2 FL (ref 9.2–12.9)
POTASSIUM SERPL-SCNC: 4.1 MMOL/L (ref 3.5–5.1)
PROT SERPL-MCNC: 7.4 G/DL (ref 6–8.4)
RBC # BLD AUTO: 5.17 M/UL (ref 4.6–6.2)
SODIUM SERPL-SCNC: 138 MMOL/L (ref 136–145)
T4 FREE SERPL-MCNC: 1 NG/DL (ref 0.71–1.51)
TRIGL SERPL-MCNC: 123 MG/DL (ref 30–150)
TSH SERPL DL<=0.005 MIU/L-ACNC: 0.68 UIU/ML (ref 0.4–4)
WBC # BLD AUTO: 6.13 K/UL (ref 3.9–12.7)

## 2020-10-15 ENCOUNTER — OFFICE VISIT (OUTPATIENT)
Dept: INTERNAL MEDICINE | Facility: CLINIC | Age: 35
End: 2020-10-15
Payer: COMMERCIAL

## 2020-10-15 VITALS
TEMPERATURE: 97 F | HEART RATE: 72 BPM | WEIGHT: 257.5 LBS | DIASTOLIC BLOOD PRESSURE: 80 MMHG | SYSTOLIC BLOOD PRESSURE: 122 MMHG | HEIGHT: 72 IN | BODY MASS INDEX: 34.88 KG/M2

## 2020-10-15 DIAGNOSIS — Z23 NEED FOR PROPHYLACTIC VACCINATION AND INOCULATION AGAINST INFLUENZA: ICD-10-CM

## 2020-10-15 DIAGNOSIS — E66.9 OBESITY (BMI 30.0-34.9): Chronic | ICD-10-CM

## 2020-10-15 DIAGNOSIS — L98.9 DERMATOSIS: ICD-10-CM

## 2020-10-15 DIAGNOSIS — Z00.00 WELL ADULT EXAM: Primary | ICD-10-CM

## 2020-10-15 DIAGNOSIS — E78.5 HYPERLIPIDEMIA, UNSPECIFIED HYPERLIPIDEMIA TYPE: Chronic | ICD-10-CM

## 2020-10-15 DIAGNOSIS — R73.01 IMPAIRED FASTING GLUCOSE: ICD-10-CM

## 2020-10-15 DIAGNOSIS — J45.40 MODERATE PERSISTENT ASTHMA WITHOUT COMPLICATION: Chronic | ICD-10-CM

## 2020-10-15 DIAGNOSIS — I10 ESSENTIAL HYPERTENSION: Chronic | ICD-10-CM

## 2020-10-15 DIAGNOSIS — J30.1 ALLERGIC RHINITIS DUE TO POLLEN, UNSPECIFIED SEASONALITY: ICD-10-CM

## 2020-10-15 PROCEDURE — 3079F DIAST BP 80-89 MM HG: CPT | Mod: CPTII,S$GLB,, | Performed by: FAMILY MEDICINE

## 2020-10-15 PROCEDURE — 90471 IMMUNIZATION ADMIN: CPT | Mod: S$GLB,,, | Performed by: FAMILY MEDICINE

## 2020-10-15 PROCEDURE — 3074F PR MOST RECENT SYSTOLIC BLOOD PRESSURE < 130 MM HG: ICD-10-PCS | Mod: CPTII,S$GLB,, | Performed by: FAMILY MEDICINE

## 2020-10-15 PROCEDURE — 99999 PR PBB SHADOW E&M-EST. PATIENT-LVL IV: CPT | Mod: PBBFAC,,, | Performed by: FAMILY MEDICINE

## 2020-10-15 PROCEDURE — 99999 PR PBB SHADOW E&M-EST. PATIENT-LVL IV: ICD-10-PCS | Mod: PBBFAC,,, | Performed by: FAMILY MEDICINE

## 2020-10-15 PROCEDURE — 90686 IIV4 VACC NO PRSV 0.5 ML IM: CPT | Mod: S$GLB,,, | Performed by: FAMILY MEDICINE

## 2020-10-15 PROCEDURE — 99395 PR PREVENTIVE VISIT,EST,18-39: ICD-10-PCS | Mod: 25,S$GLB,, | Performed by: FAMILY MEDICINE

## 2020-10-15 PROCEDURE — 90686 FLU VACCINE (QUAD) GREATER THAN OR EQUAL TO 3YO PRESERVATIVE FREE IM: ICD-10-PCS | Mod: S$GLB,,, | Performed by: FAMILY MEDICINE

## 2020-10-15 PROCEDURE — 99395 PREV VISIT EST AGE 18-39: CPT | Mod: 25,S$GLB,, | Performed by: FAMILY MEDICINE

## 2020-10-15 PROCEDURE — 3079F PR MOST RECENT DIASTOLIC BLOOD PRESSURE 80-89 MM HG: ICD-10-PCS | Mod: CPTII,S$GLB,, | Performed by: FAMILY MEDICINE

## 2020-10-15 PROCEDURE — 3074F SYST BP LT 130 MM HG: CPT | Mod: CPTII,S$GLB,, | Performed by: FAMILY MEDICINE

## 2020-10-15 PROCEDURE — 90471 FLU VACCINE (QUAD) GREATER THAN OR EQUAL TO 3YO PRESERVATIVE FREE IM: ICD-10-PCS | Mod: S$GLB,,, | Performed by: FAMILY MEDICINE

## 2020-10-15 RX ORDER — ALBUTEROL SULFATE 90 UG/1
2 AEROSOL, METERED RESPIRATORY (INHALATION) EVERY 6 HOURS PRN
Qty: 18 G | Refills: 11 | Status: SHIPPED | OUTPATIENT
Start: 2020-10-15 | End: 2021-11-19 | Stop reason: SDUPTHER

## 2020-10-15 NOTE — PROGRESS NOTES
Subjective:       Patient ID: Roman Quinn is a 35 y.o. male.    Chief Complaint: Annual Exam    HPI 35-year-old white male presents to clinic today for annual physical exam.  He continues to have well-controlled hypertension on amlodipine 5 mg daily and lisinopril 20 mg daily.  Hyperlipidemia remains well controlled on simvastatin 20 mg daily.  Mild persistent asthma is well controlled on Breo daily.  He has a past medical history of pneumonia with resulting pneumobilia as time that currently has fully resolved.  He has a past surgical history of hernia repair.  He has a family history of his mother having diabetes.  He is up-to-date with all vaccinations except for flu vaccine which has been given today.  Finally, he is concerned secondary to noticing a raised lesion to his right shoulder.  Review of Systems   Constitutional: Negative for activity change, appetite change, chills, fatigue, fever and unexpected weight change.   HENT: Negative for nasal congestion, ear pain, hearing loss, postnasal drip, rhinorrhea, sinus pressure/congestion, sore throat, tinnitus and trouble swallowing.    Eyes: Negative for discharge, redness, itching and visual disturbance.   Respiratory: Negative for cough, chest tightness, shortness of breath and wheezing.    Cardiovascular: Negative for chest pain and palpitations.   Gastrointestinal: Negative for abdominal pain, blood in stool, constipation, diarrhea, nausea and vomiting.   Endocrine: Negative for polydipsia and polyuria.   Genitourinary: Negative for decreased urine volume, difficulty urinating, dysuria, frequency, hematuria and urgency.   Musculoskeletal: Negative for arthralgias, back pain, joint swelling, myalgias, neck pain and neck stiffness.   Integumentary:  Positive for mole/lesion. Negative for rash.   Neurological: Negative for dizziness, weakness, light-headedness and headaches.   Psychiatric/Behavioral: Negative.  Negative for confusion and dysphoric mood.          Objective:      Physical Exam  Vitals signs and nursing note reviewed.   Constitutional:       General: He is not in acute distress.     Appearance: He is well-developed. He is not diaphoretic.   HENT:      Head: Normocephalic and atraumatic.      Right Ear: External ear normal.      Left Ear: External ear normal.      Nose: Nose normal.      Mouth/Throat:      Pharynx: No oropharyngeal exudate.   Eyes:      General: No scleral icterus.        Right eye: No discharge.         Left eye: No discharge.      Conjunctiva/sclera: Conjunctivae normal.      Pupils: Pupils are equal, round, and reactive to light.   Neck:      Musculoskeletal: Normal range of motion and neck supple.      Thyroid: No thyromegaly.      Vascular: No JVD.      Trachea: No tracheal deviation.   Cardiovascular:      Rate and Rhythm: Normal rate and regular rhythm.      Heart sounds: Normal heart sounds. No murmur. No friction rub. No gallop.    Pulmonary:      Effort: Pulmonary effort is normal. No respiratory distress.      Breath sounds: Normal breath sounds. No stridor. No wheezing or rales.   Abdominal:      General: Bowel sounds are normal. There is no distension.      Palpations: Abdomen is soft. There is no mass.      Tenderness: There is no abdominal tenderness. There is no guarding or rebound.   Musculoskeletal: Normal range of motion.         General: No tenderness.   Lymphadenopathy:      Cervical: No cervical adenopathy.   Skin:     General: Skin is warm and dry.      Coloration: Skin is not pale.      Findings: No erythema or rash.          Neurological:      Mental Status: He is alert and oriented to person, place, and time.   Psychiatric:         Behavior: Behavior normal.         Thought Content: Thought content normal.         Judgment: Judgment normal.         Assessment:       1. Well adult exam    2. Essential hypertension    3. Hyperlipidemia, unspecified hyperlipidemia type    4. Moderate persistent asthma without  complication    5. Allergic rhinitis due to pollen, unspecified seasonality    6. Impaired fasting glucose    7. Obesity (BMI 30.0-34.9)    8. Need for prophylactic vaccination and inoculation against influenza    9. Dermatosis        Plan:       1.  Labs have been reviewed and are within normal limits.  2.  Continue amlodipine 5 mg daily and lisinopril 20 mg daily.  Hypertension is well controlled.  3.  Continue simvastatin 20 mg daily.  Hyperlipidemia is well controlled.  4.  Continue Breo 1 inhalation daily.  Asthma is well controlled.  5.  Continue Flonase nasal spray as needed.  Allergic rhinitis is stable.  6.  Impaired fasting glucose is well controlled.  7.  Encourage diet and exercise.  8.  Flu vaccine given.  9.  Refer to dermatology.  10.  Return to clinic as needed or in 1 year for annual exam.

## 2020-11-03 ENCOUNTER — OFFICE VISIT (OUTPATIENT)
Dept: DERMATOLOGY | Facility: CLINIC | Age: 35
End: 2020-11-03
Payer: COMMERCIAL

## 2020-11-03 DIAGNOSIS — D22.9 BENIGN MOLE: Primary | ICD-10-CM

## 2020-11-03 DIAGNOSIS — L98.9 DERMATOSIS: ICD-10-CM

## 2020-11-03 DIAGNOSIS — D23.9 DERMATOFIBROMA: ICD-10-CM

## 2020-11-03 DIAGNOSIS — D22.39 FIBROUS PAPULE OF NOSE: ICD-10-CM

## 2020-11-03 PROCEDURE — 99202 PR OFFICE/OUTPT VISIT, NEW, LEVL II, 15-29 MIN: ICD-10-PCS | Mod: S$GLB,,, | Performed by: DERMATOLOGY

## 2020-11-03 PROCEDURE — 99999 PR PBB SHADOW E&M-EST. PATIENT-LVL III: ICD-10-PCS | Mod: PBBFAC,,, | Performed by: DERMATOLOGY

## 2020-11-03 PROCEDURE — 99202 OFFICE O/P NEW SF 15 MIN: CPT | Mod: S$GLB,,, | Performed by: DERMATOLOGY

## 2020-11-03 PROCEDURE — 99999 PR PBB SHADOW E&M-EST. PATIENT-LVL III: CPT | Mod: PBBFAC,,, | Performed by: DERMATOLOGY

## 2020-11-03 NOTE — PROGRESS NOTES
Subjective:       Patient ID:  Roman Quinn is a 35 y.o. male who presents for   Chief Complaint   Patient presents with    Lesion     right shoulder, X3yrs, hard , no tx      HPI    Review of Systems   Constitutional: Negative.    HENT: Negative.    Respiratory: Negative.    Musculoskeletal: Negative.    Skin: Negative for rash.        Objective:    Physical Exam   Constitutional: He appears well-developed and well-nourished.   Eyes: No conjunctival no injection.   Neurological: He is alert and oriented to person, place, and time.   Psychiatric: He has a normal mood and affect.   Skin:   Areas Examined (abnormalities noted in diagram):   Head / Face Inspection Performed                   Diagram Legend     Erythematous scaling macule/papule c/w actinic keratosis       Vascular papule c/w angioma      Pigmented verrucoid papule/plaque c/w seborrheic keratosis      Yellow umbilicated papule c/w sebaceous hyperplasia      Irregularly shaped tan macule c/w lentigo     1-2 mm smooth white papules consistent with Milia      Movable subcutaneous cyst with punctum c/w epidermal inclusion cyst      Subcutaneous movable cyst c/w pilar cyst      Firm pink to brown papule c/w dermatofibroma      Pedunculated fleshy papule(s) c/w skin tag(s)      Evenly pigmented macule c/w junctional nevus     Mildly variegated pigmented, slightly irregular-bordered macule c/w mildly atypical nevus      Flesh colored to evenly pigmented papule c/w intradermal nevus       Pink pearly papule/plaque c/w basal cell carcinoma      Erythematous hyperkeratotic cursted plaque c/w SCC      Surgical scar with no sign of skin cancer recurrence      Open and closed comedones      Inflammatory papules and pustules      Verrucoid papule consistent consistent with wart     Erythematous eczematous patches and plaques     Dystrophic onycholytic nail with subungual debris c/w onychomycosis     Umbilicated papule    Erythematous-base heme-crusted tan  verrucoid plaque consistent with inflamed seborrheic keratosis     Erythematous Silvery Scaling Plaque c/w Psoriasis     See annotation      Assessment / Plan:        Benign mole  Discussed with patient the benign nature of these lesions and that no treatment is indicated.      Dermatosis  -     Ambulatory referral/consult to Dermatology    Dermatofibroma  Discussed with patient the benign nature of these lesions and that no treatment is indicated.    Discussed with patient the etiology and pathogenesis of the disease or skin lesion(s) and possible treatments and aggravators.    Chronic nature of this condition discussed with patient.    Fibrous papule of nose  Discussed with patient the benign nature of these lesions and that no treatment is indicated.    Chronic nature of this condition discussed with patient.  Prn cosmetic hyfrecation of fps on the nose.  Costs $300.  Scar and recurrence reviewed.             Follow up if symptoms worsen or fail to improve.

## 2020-11-03 NOTE — LETTER
November 3, 2020      Hung Reid MD  [address] [facility] [place] [address]           [facility] - Dermatology [address]  [address]  [facility] [address]  Phone: [phone]  Fax: [phone]          Patient: Roman Quinn   MR Number: [id number]   YOB: 1985   Date of Visit: 11/3/2020       Dear Dr. Hung Reid:    Thank you for referring Roman Quinn to me for evaluation. Attached you will find relevant portions of my assessment and plan of care.    If you have questions, please do not hesitate to call me. I look forward to following Roman Quinn along with you.    Sincerely,    Darnell Awad MD    Enclosure  CC:  No Recipients    If you would like to receive this communication electronically, please contact [email or web address] or [phone] to request more information on [employer] access.    For providers and/or their staff who would like to refer a patient to [facility], please contact us through our one-stop-shop provider referral line, [facility], at [phone].    If you feel you have received this communication in error or would no longer like to receive these types of communications, please e-mail [email or web address]

## 2021-05-14 ENCOUNTER — OFFICE VISIT (OUTPATIENT)
Dept: URGENT CARE | Facility: CLINIC | Age: 36
End: 2021-05-14
Payer: COMMERCIAL

## 2021-05-14 VITALS
WEIGHT: 235 LBS | HEIGHT: 72 IN | SYSTOLIC BLOOD PRESSURE: 135 MMHG | TEMPERATURE: 98 F | HEART RATE: 92 BPM | OXYGEN SATURATION: 95 % | BODY MASS INDEX: 31.83 KG/M2 | DIASTOLIC BLOOD PRESSURE: 91 MMHG | RESPIRATION RATE: 15 BRPM

## 2021-05-14 DIAGNOSIS — J06.9 VIRAL URI: ICD-10-CM

## 2021-05-14 DIAGNOSIS — J98.01 BRONCHOSPASM: Primary | ICD-10-CM

## 2021-05-14 PROCEDURE — 99214 OFFICE O/P EST MOD 30 MIN: CPT | Mod: 25,S$GLB,, | Performed by: FAMILY MEDICINE

## 2021-05-14 PROCEDURE — 99214 PR OFFICE/OUTPT VISIT, EST, LEVL IV, 30-39 MIN: ICD-10-PCS | Mod: 25,S$GLB,, | Performed by: FAMILY MEDICINE

## 2021-05-14 PROCEDURE — 94640 AIRWAY INHALATION TREATMENT: CPT | Mod: S$GLB,,, | Performed by: FAMILY MEDICINE

## 2021-05-14 PROCEDURE — 3008F BODY MASS INDEX DOCD: CPT | Mod: CPTII,S$GLB,, | Performed by: FAMILY MEDICINE

## 2021-05-14 PROCEDURE — 3008F PR BODY MASS INDEX (BMI) DOCUMENTED: ICD-10-PCS | Mod: CPTII,S$GLB,, | Performed by: FAMILY MEDICINE

## 2021-05-14 PROCEDURE — 94640 PR INHAL RX, AIRWAY OBST/DX SPUTUM INDUCT: ICD-10-PCS | Mod: S$GLB,,, | Performed by: FAMILY MEDICINE

## 2021-05-14 RX ORDER — IPRATROPIUM BROMIDE 0.5 MG/2.5ML
0.5 SOLUTION RESPIRATORY (INHALATION)
Status: COMPLETED | OUTPATIENT
Start: 2021-05-14 | End: 2021-05-14

## 2021-05-14 RX ORDER — ALBUTEROL SULFATE 0.83 MG/ML
2.5 SOLUTION RESPIRATORY (INHALATION)
Status: COMPLETED | OUTPATIENT
Start: 2021-05-14 | End: 2021-05-14

## 2021-05-14 RX ORDER — PREDNISONE 20 MG/1
40 TABLET ORAL DAILY
Qty: 10 TABLET | Refills: 0 | Status: SHIPPED | OUTPATIENT
Start: 2021-05-14 | End: 2021-05-19

## 2021-05-14 RX ADMIN — IPRATROPIUM BROMIDE 0.5 MG: 0.5 SOLUTION RESPIRATORY (INHALATION) at 08:05

## 2021-05-14 RX ADMIN — ALBUTEROL SULFATE 2.5 MG: 0.83 SOLUTION RESPIRATORY (INHALATION) at 08:05

## 2021-10-05 ENCOUNTER — TELEPHONE (OUTPATIENT)
Dept: INTERNAL MEDICINE | Facility: CLINIC | Age: 36
End: 2021-10-05

## 2021-10-05 DIAGNOSIS — R73.01 IMPAIRED FASTING GLUCOSE: ICD-10-CM

## 2021-10-05 DIAGNOSIS — E78.5 HYPERLIPIDEMIA, UNSPECIFIED HYPERLIPIDEMIA TYPE: Chronic | ICD-10-CM

## 2021-10-05 DIAGNOSIS — Z00.00 WELL ADULT EXAM: Primary | ICD-10-CM

## 2021-10-08 ENCOUNTER — IMMUNIZATION (OUTPATIENT)
Dept: INTERNAL MEDICINE | Facility: CLINIC | Age: 36
End: 2021-10-08
Payer: COMMERCIAL

## 2021-10-08 DIAGNOSIS — Z23 NEED FOR VACCINATION: Primary | ICD-10-CM

## 2021-10-08 PROCEDURE — 91300 COVID-19, MRNA, LNP-S, PF, 30 MCG/0.3 ML DOSE VACCINE: CPT | Mod: PBBFAC | Performed by: INTERNAL MEDICINE

## 2021-10-08 PROCEDURE — 0003A COVID-19, MRNA, LNP-S, PF, 30 MCG/0.3 ML DOSE VACCINE: CPT | Mod: PBBFAC | Performed by: INTERNAL MEDICINE

## 2021-10-28 ENCOUNTER — PATIENT OUTREACH (OUTPATIENT)
Dept: ADMINISTRATIVE | Facility: HOSPITAL | Age: 36
End: 2021-10-28
Payer: COMMERCIAL

## 2021-10-28 ENCOUNTER — PATIENT MESSAGE (OUTPATIENT)
Dept: ADMINISTRATIVE | Facility: HOSPITAL | Age: 36
End: 2021-10-28
Payer: COMMERCIAL

## 2021-11-03 RX ORDER — LISINOPRIL 20 MG/1
TABLET ORAL
Qty: 30 TABLET | Refills: 0 | Status: SHIPPED | OUTPATIENT
Start: 2021-11-03 | End: 2021-11-19 | Stop reason: SDUPTHER

## 2021-11-05 RX ORDER — FLUTICASONE FUROATE AND VILANTEROL TRIFENATATE 100; 25 UG/1; UG/1
POWDER RESPIRATORY (INHALATION)
Qty: 60 EACH | Refills: 2 | Status: SHIPPED | OUTPATIENT
Start: 2021-11-05 | End: 2021-11-19 | Stop reason: SDUPTHER

## 2021-11-06 ENCOUNTER — PATIENT MESSAGE (OUTPATIENT)
Dept: ADMINISTRATIVE | Facility: HOSPITAL | Age: 36
End: 2021-11-06
Payer: COMMERCIAL

## 2021-11-12 ENCOUNTER — PATIENT MESSAGE (OUTPATIENT)
Dept: INTERNAL MEDICINE | Facility: CLINIC | Age: 36
End: 2021-11-12

## 2021-11-12 ENCOUNTER — LAB VISIT (OUTPATIENT)
Dept: LAB | Facility: HOSPITAL | Age: 36
End: 2021-11-12
Attending: FAMILY MEDICINE
Payer: COMMERCIAL

## 2021-11-12 ENCOUNTER — CLINICAL SUPPORT (OUTPATIENT)
Dept: INTERNAL MEDICINE | Facility: CLINIC | Age: 36
End: 2021-11-12
Payer: COMMERCIAL

## 2021-11-12 VITALS — SYSTOLIC BLOOD PRESSURE: 120 MMHG | DIASTOLIC BLOOD PRESSURE: 78 MMHG

## 2021-11-12 DIAGNOSIS — Z00.00 WELL ADULT EXAM: ICD-10-CM

## 2021-11-12 DIAGNOSIS — E78.5 HYPERLIPIDEMIA, UNSPECIFIED HYPERLIPIDEMIA TYPE: Chronic | ICD-10-CM

## 2021-11-12 DIAGNOSIS — R73.01 IMPAIRED FASTING GLUCOSE: ICD-10-CM

## 2021-11-12 DIAGNOSIS — E55.9 VITAMIN D INSUFFICIENCY: ICD-10-CM

## 2021-11-12 LAB
25(OH)D3+25(OH)D2 SERPL-MCNC: 21 NG/ML (ref 30–96)
ALBUMIN SERPL BCP-MCNC: 4.1 G/DL (ref 3.5–5.2)
ALP SERPL-CCNC: 57 U/L (ref 55–135)
ALT SERPL W/O P-5'-P-CCNC: 27 U/L (ref 10–44)
ANION GAP SERPL CALC-SCNC: 9 MMOL/L (ref 8–16)
AST SERPL-CCNC: 31 U/L (ref 10–40)
BASOPHILS # BLD AUTO: 0.07 K/UL (ref 0–0.2)
BASOPHILS NFR BLD: 1.3 % (ref 0–1.9)
BILIRUB SERPL-MCNC: 0.7 MG/DL (ref 0.1–1)
BILIRUB UR QL STRIP: NEGATIVE
BUN SERPL-MCNC: 18 MG/DL (ref 6–20)
CALCIUM SERPL-MCNC: 9.4 MG/DL (ref 8.7–10.5)
CHLORIDE SERPL-SCNC: 105 MMOL/L (ref 95–110)
CHOLEST SERPL-MCNC: 182 MG/DL (ref 120–199)
CHOLEST/HDLC SERPL: 4.1 {RATIO} (ref 2–5)
CLARITY UR REFRACT.AUTO: CLEAR
CO2 SERPL-SCNC: 25 MMOL/L (ref 23–29)
COLOR UR AUTO: YELLOW
CREAT SERPL-MCNC: 0.9 MG/DL (ref 0.5–1.4)
DIFFERENTIAL METHOD: NORMAL
EOSINOPHIL # BLD AUTO: 0.3 K/UL (ref 0–0.5)
EOSINOPHIL NFR BLD: 6.1 % (ref 0–8)
ERYTHROCYTE [DISTWIDTH] IN BLOOD BY AUTOMATED COUNT: 12.4 % (ref 11.5–14.5)
EST. GFR  (AFRICAN AMERICAN): >60 ML/MIN/1.73 M^2
EST. GFR  (NON AFRICAN AMERICAN): >60 ML/MIN/1.73 M^2
ESTIMATED AVG GLUCOSE: 114 MG/DL (ref 68–131)
GLUCOSE SERPL-MCNC: 114 MG/DL (ref 70–110)
GLUCOSE UR QL STRIP: NEGATIVE
HBA1C MFR BLD: 5.6 % (ref 4–5.6)
HCT VFR BLD AUTO: 47 % (ref 40–54)
HDLC SERPL-MCNC: 44 MG/DL (ref 40–75)
HDLC SERPL: 24.2 % (ref 20–50)
HGB BLD-MCNC: 15.2 G/DL (ref 14–18)
HGB UR QL STRIP: NEGATIVE
IMM GRANULOCYTES # BLD AUTO: 0.01 K/UL (ref 0–0.04)
IMM GRANULOCYTES NFR BLD AUTO: 0.2 % (ref 0–0.5)
KETONES UR QL STRIP: NEGATIVE
LDLC SERPL CALC-MCNC: 119.6 MG/DL (ref 63–159)
LEUKOCYTE ESTERASE UR QL STRIP: NEGATIVE
LYMPHOCYTES # BLD AUTO: 2 K/UL (ref 1–4.8)
LYMPHOCYTES NFR BLD: 36.1 % (ref 18–48)
MCH RBC QN AUTO: 29.3 PG (ref 27–31)
MCHC RBC AUTO-ENTMCNC: 32.3 G/DL (ref 32–36)
MCV RBC AUTO: 91 FL (ref 82–98)
MONOCYTES # BLD AUTO: 0.6 K/UL (ref 0.3–1)
MONOCYTES NFR BLD: 10 % (ref 4–15)
NEUTROPHILS # BLD AUTO: 2.6 K/UL (ref 1.8–7.7)
NEUTROPHILS NFR BLD: 46.3 % (ref 38–73)
NITRITE UR QL STRIP: NEGATIVE
NONHDLC SERPL-MCNC: 138 MG/DL
NRBC BLD-RTO: 0 /100 WBC
PH UR STRIP: 6 [PH] (ref 5–8)
PLATELET # BLD AUTO: 274 K/UL (ref 150–450)
PMV BLD AUTO: 10.2 FL (ref 9.2–12.9)
POTASSIUM SERPL-SCNC: 4.6 MMOL/L (ref 3.5–5.1)
PROT SERPL-MCNC: 7 G/DL (ref 6–8.4)
PROT UR QL STRIP: NEGATIVE
RBC # BLD AUTO: 5.18 M/UL (ref 4.6–6.2)
SODIUM SERPL-SCNC: 139 MMOL/L (ref 136–145)
SP GR UR STRIP: 1.03 (ref 1–1.03)
T4 FREE SERPL-MCNC: 0.99 NG/DL (ref 0.71–1.51)
TRIGL SERPL-MCNC: 92 MG/DL (ref 30–150)
TSH SERPL DL<=0.005 MIU/L-ACNC: 1.06 UIU/ML (ref 0.4–4)
URN SPEC COLLECT METH UR: NORMAL
WBC # BLD AUTO: 5.59 K/UL (ref 3.9–12.7)

## 2021-11-12 PROCEDURE — 81003 URINALYSIS AUTO W/O SCOPE: CPT | Performed by: FAMILY MEDICINE

## 2021-11-12 PROCEDURE — 84439 ASSAY OF FREE THYROXINE: CPT | Performed by: FAMILY MEDICINE

## 2021-11-12 PROCEDURE — 36415 COLL VENOUS BLD VENIPUNCTURE: CPT | Mod: PO | Performed by: FAMILY MEDICINE

## 2021-11-12 PROCEDURE — 80053 COMPREHEN METABOLIC PANEL: CPT | Performed by: FAMILY MEDICINE

## 2021-11-12 PROCEDURE — 82306 VITAMIN D 25 HYDROXY: CPT | Performed by: FAMILY MEDICINE

## 2021-11-12 PROCEDURE — 80061 LIPID PANEL: CPT | Performed by: FAMILY MEDICINE

## 2021-11-12 PROCEDURE — 83036 HEMOGLOBIN GLYCOSYLATED A1C: CPT | Performed by: FAMILY MEDICINE

## 2021-11-12 PROCEDURE — 84443 ASSAY THYROID STIM HORMONE: CPT | Performed by: FAMILY MEDICINE

## 2021-11-12 PROCEDURE — 85025 COMPLETE CBC W/AUTO DIFF WBC: CPT | Performed by: FAMILY MEDICINE

## 2021-11-12 RX ORDER — ERGOCALCIFEROL 1.25 MG/1
50000 CAPSULE ORAL
Qty: 4 CAPSULE | Refills: 2 | Status: SHIPPED | OUTPATIENT
Start: 2021-11-12 | End: 2022-03-21

## 2021-11-19 ENCOUNTER — OFFICE VISIT (OUTPATIENT)
Dept: INTERNAL MEDICINE | Facility: CLINIC | Age: 36
End: 2021-11-19
Payer: COMMERCIAL

## 2021-11-19 VITALS
TEMPERATURE: 97 F | BODY MASS INDEX: 34.9 KG/M2 | WEIGHT: 257.69 LBS | SYSTOLIC BLOOD PRESSURE: 136 MMHG | DIASTOLIC BLOOD PRESSURE: 82 MMHG | HEIGHT: 72 IN | RESPIRATION RATE: 18 BRPM | HEART RATE: 96 BPM | OXYGEN SATURATION: 97 %

## 2021-11-19 DIAGNOSIS — E55.9 VITAMIN D INSUFFICIENCY: ICD-10-CM

## 2021-11-19 DIAGNOSIS — E78.49 OTHER HYPERLIPIDEMIA: Chronic | ICD-10-CM

## 2021-11-19 DIAGNOSIS — I10 ESSENTIAL HYPERTENSION: Chronic | ICD-10-CM

## 2021-11-19 DIAGNOSIS — E66.9 OBESITY (BMI 30.0-34.9): Chronic | ICD-10-CM

## 2021-11-19 DIAGNOSIS — J45.40 MODERATE PERSISTENT ASTHMA WITHOUT COMPLICATION: Chronic | ICD-10-CM

## 2021-11-19 DIAGNOSIS — Z00.00 WELL ADULT EXAM: Primary | ICD-10-CM

## 2021-11-19 PROCEDURE — 4010F ACE/ARB THERAPY RXD/TAKEN: CPT | Mod: CPTII,S$GLB,, | Performed by: FAMILY MEDICINE

## 2021-11-19 PROCEDURE — 1160F PR REVIEW ALL MEDS BY PRESCRIBER/CLIN PHARMACIST DOCUMENTED: ICD-10-PCS | Mod: CPTII,S$GLB,, | Performed by: FAMILY MEDICINE

## 2021-11-19 PROCEDURE — 99999 PR PBB SHADOW E&M-EST. PATIENT-LVL IV: ICD-10-PCS | Mod: PBBFAC,,, | Performed by: FAMILY MEDICINE

## 2021-11-19 PROCEDURE — 90686 IIV4 VACC NO PRSV 0.5 ML IM: CPT | Mod: S$GLB,,, | Performed by: FAMILY MEDICINE

## 2021-11-19 PROCEDURE — 99999 PR PBB SHADOW E&M-EST. PATIENT-LVL IV: CPT | Mod: PBBFAC,,, | Performed by: FAMILY MEDICINE

## 2021-11-19 PROCEDURE — 99395 PREV VISIT EST AGE 18-39: CPT | Mod: 25,S$GLB,, | Performed by: FAMILY MEDICINE

## 2021-11-19 PROCEDURE — 90471 FLU VACCINE (QUAD) GREATER THAN OR EQUAL TO 3YO PRESERVATIVE FREE IM: ICD-10-PCS | Mod: S$GLB,,, | Performed by: FAMILY MEDICINE

## 2021-11-19 PROCEDURE — 3075F PR MOST RECENT SYSTOLIC BLOOD PRESS GE 130-139MM HG: ICD-10-PCS | Mod: CPTII,S$GLB,, | Performed by: FAMILY MEDICINE

## 2021-11-19 PROCEDURE — 3008F BODY MASS INDEX DOCD: CPT | Mod: CPTII,S$GLB,, | Performed by: FAMILY MEDICINE

## 2021-11-19 PROCEDURE — 3075F SYST BP GE 130 - 139MM HG: CPT | Mod: CPTII,S$GLB,, | Performed by: FAMILY MEDICINE

## 2021-11-19 PROCEDURE — 90471 IMMUNIZATION ADMIN: CPT | Mod: S$GLB,,, | Performed by: FAMILY MEDICINE

## 2021-11-19 PROCEDURE — 3008F PR BODY MASS INDEX (BMI) DOCUMENTED: ICD-10-PCS | Mod: CPTII,S$GLB,, | Performed by: FAMILY MEDICINE

## 2021-11-19 PROCEDURE — 3079F PR MOST RECENT DIASTOLIC BLOOD PRESSURE 80-89 MM HG: ICD-10-PCS | Mod: CPTII,S$GLB,, | Performed by: FAMILY MEDICINE

## 2021-11-19 PROCEDURE — 3044F HG A1C LEVEL LT 7.0%: CPT | Mod: CPTII,S$GLB,, | Performed by: FAMILY MEDICINE

## 2021-11-19 PROCEDURE — 3079F DIAST BP 80-89 MM HG: CPT | Mod: CPTII,S$GLB,, | Performed by: FAMILY MEDICINE

## 2021-11-19 PROCEDURE — 1160F RVW MEDS BY RX/DR IN RCRD: CPT | Mod: CPTII,S$GLB,, | Performed by: FAMILY MEDICINE

## 2021-11-19 PROCEDURE — 90686 FLU VACCINE (QUAD) GREATER THAN OR EQUAL TO 3YO PRESERVATIVE FREE IM: ICD-10-PCS | Mod: S$GLB,,, | Performed by: FAMILY MEDICINE

## 2021-11-19 PROCEDURE — 1159F PR MEDICATION LIST DOCUMENTED IN MEDICAL RECORD: ICD-10-PCS | Mod: CPTII,S$GLB,, | Performed by: FAMILY MEDICINE

## 2021-11-19 PROCEDURE — 1159F MED LIST DOCD IN RCRD: CPT | Mod: CPTII,S$GLB,, | Performed by: FAMILY MEDICINE

## 2021-11-19 PROCEDURE — 99395 PR PREVENTIVE VISIT,EST,18-39: ICD-10-PCS | Mod: 25,S$GLB,, | Performed by: FAMILY MEDICINE

## 2021-11-19 PROCEDURE — 4010F PR ACE/ARB THEARPY RXD/TAKEN: ICD-10-PCS | Mod: CPTII,S$GLB,, | Performed by: FAMILY MEDICINE

## 2021-11-19 PROCEDURE — 3044F PR MOST RECENT HEMOGLOBIN A1C LEVEL <7.0%: ICD-10-PCS | Mod: CPTII,S$GLB,, | Performed by: FAMILY MEDICINE

## 2021-11-19 RX ORDER — ALBUTEROL SULFATE 90 UG/1
2 AEROSOL, METERED RESPIRATORY (INHALATION) EVERY 6 HOURS PRN
Qty: 18 G | Refills: 11 | Status: SHIPPED | OUTPATIENT
Start: 2021-11-19 | End: 2023-02-17

## 2021-11-19 RX ORDER — AMLODIPINE BESYLATE 5 MG/1
5 TABLET ORAL DAILY
Qty: 90 TABLET | Refills: 3 | Status: SHIPPED | OUTPATIENT
Start: 2021-11-19 | End: 2021-12-05

## 2021-11-19 RX ORDER — LISINOPRIL 20 MG/1
20 TABLET ORAL DAILY
Qty: 90 TABLET | Refills: 3 | Status: SHIPPED | OUTPATIENT
Start: 2021-11-19 | End: 2023-01-08

## 2021-11-19 RX ORDER — FLUTICASONE FUROATE AND VILANTEROL TRIFENATATE 100; 25 UG/1; UG/1
1 POWDER RESPIRATORY (INHALATION) DAILY
Qty: 180 EACH | Refills: 3 | Status: SHIPPED | OUTPATIENT
Start: 2021-11-19 | End: 2023-01-18

## 2021-11-19 RX ORDER — SIMVASTATIN 20 MG/1
20 TABLET, FILM COATED ORAL NIGHTLY
Qty: 90 TABLET | Refills: 3 | Status: SHIPPED | OUTPATIENT
Start: 2021-11-19 | End: 2022-11-27

## 2021-12-31 RX ORDER — AMLODIPINE BESYLATE 5 MG/1
5 TABLET ORAL DAILY
Qty: 90 TABLET | Refills: 3 | Status: SHIPPED | OUTPATIENT
Start: 2021-12-31 | End: 2023-01-08

## 2022-01-31 NOTE — TELEPHONE ENCOUNTER
No new care gaps identified.  Powered by [employer]. Reference number: [id number].   1/31/2022 12:11:26 AM CST

## 2022-02-08 RX ORDER — FLUTICASONE PROPIONATE 50 MCG
SPRAY, SUSPENSION (ML) NASAL
Qty: 48 ML | Refills: 3 | Status: SHIPPED | OUTPATIENT
Start: 2022-02-08 | End: 2022-03-08 | Stop reason: SDUPTHER

## 2022-02-08 NOTE — TELEPHONE ENCOUNTER
Refill Authorization Note   Roman Quinn  is requesting a refill authorization.  Brief Assessment and Rationale for Refill:  Approve     Medication Therapy Plan:       Medication Reconciliation Completed: No   Comments:   --->Care Gap information included below if applicable.   Orders Placed This Encounter    fluticasone propionate (FLONASE) 50 mcg/actuation nasal spray      Requested Prescriptions   Signed Prescriptions Disp Refills    fluticasone propionate (FLONASE) 50 mcg/actuation nasal spray 48 mL 3     Sig: INSTILL 2 SPRAYS IN EACH NOSTRIL ONCE A DAY       Ear, Nose, and Throat: Nasal Preparations - Corticosteroids Passed - 1/31/2022 12:11 AM        Passed - Patient is at least 18 years old        Passed - Valid encounter within last 15 months     Recent Visits  Date Type Provider Dept   11/19/21 Office Visit Hung Reid MD [facility]   10/15/20 Office Visit Hung Reid MD [facility]   04/06/20 Office Visit Hung Reid MD [facility]   Showing recent visits within past 720 days and meeting all other requirements  Future Appointments  No visits were found meeting these conditions.  Showing future appointments within next 150 days and meeting all other requirements                    Appointments  past 12m or future 3m with PCP    Date Provider   Last Visit   11/19/2021 Hung Reid MD   Next Visit   Visit date not found Hung Reid MD   ED visits in past 90 days: 0     Note composed:6:44 AM 02/08/2022

## 2022-03-08 DIAGNOSIS — J30.1 ALLERGIC RHINITIS DUE TO POLLEN, UNSPECIFIED SEASONALITY: Primary | ICD-10-CM

## 2022-03-08 RX ORDER — FLUTICASONE PROPIONATE 50 MCG
2 SPRAY, SUSPENSION (ML) NASAL 2 TIMES DAILY
Qty: 48 ML | Refills: 3 | Status: SHIPPED | OUTPATIENT
Start: 2022-03-08 | End: 2023-03-31

## 2022-03-08 NOTE — TELEPHONE ENCOUNTER
No new care gaps identified.  Powered by [employer]. Reference number: [id number].   3/08/2022 10:32:58 AM CST

## 2022-03-21 DIAGNOSIS — E55.9 VITAMIN D INSUFFICIENCY: ICD-10-CM

## 2022-03-21 RX ORDER — ERGOCALCIFEROL 1.25 MG/1
CAPSULE ORAL
Qty: 4 CAPSULE | Refills: 2 | Status: SHIPPED | OUTPATIENT
Start: 2022-03-21 | End: 2022-06-02

## 2022-06-02 DIAGNOSIS — E55.9 VITAMIN D INSUFFICIENCY: ICD-10-CM

## 2022-06-02 RX ORDER — ERGOCALCIFEROL 1.25 MG/1
CAPSULE ORAL
Qty: 4 CAPSULE | Refills: 2 | Status: CANCELLED | OUTPATIENT
Start: 2022-06-02

## 2022-06-02 RX ORDER — ERGOCALCIFEROL 1.25 MG/1
CAPSULE ORAL
Qty: 4 CAPSULE | Refills: 2 | Status: SHIPPED | OUTPATIENT
Start: 2022-06-02 | End: 2022-12-15

## 2022-11-26 DIAGNOSIS — Z00.00 WELL ADULT EXAM: Primary | ICD-10-CM

## 2022-11-26 DIAGNOSIS — E55.9 VITAMIN D INSUFFICIENCY: ICD-10-CM

## 2022-11-26 DIAGNOSIS — E78.49 OTHER HYPERLIPIDEMIA: Chronic | ICD-10-CM

## 2022-11-26 DIAGNOSIS — R73.01 IMPAIRED FASTING GLUCOSE: ICD-10-CM

## 2022-11-26 NOTE — TELEPHONE ENCOUNTER
Care Due:                  Date            Visit Type   Department     Provider  --------------------------------------------------------------------------------                                MYCHART                              ANNUAL                              CHECKUP/PHY  MET INTERNAL  Last Visit: 11-      S            SHANNON Reid  Next Visit: None Scheduled  None         None Found                                                            Last  Test          Frequency    Reason                     Performed    Due Date  --------------------------------------------------------------------------------    Office Visit  12 months..  albuterol, amLODIPine,     11- 11-                             fluticasone, lisinopriL,                             simvastatin..............    CMP.........  12 months..  lisinopriL, simvastatin..  11- 11-    Lipid Panel.  12 months..  simvastatin..............  11- 11-    [facility] Embedded Care Gaps. Reference number: [id number]. 11/26/2022   5:37:47 AM CST

## 2022-11-27 RX ORDER — SIMVASTATIN 20 MG/1
TABLET, FILM COATED ORAL
Qty: 90 TABLET | Refills: 0 | Status: SHIPPED | OUTPATIENT
Start: 2022-11-27 | End: 2023-01-20

## 2022-11-27 NOTE — TELEPHONE ENCOUNTER
Refill Routing Note   Medication(s) are not appropriate for processing by [facility] for the following reason(s):      - Required laboratory values are outdated    [facility] action(s):  Defer Medication-related problems identified:   Requires labs  Requires appointment     Medication Therapy Plan: Annual OV due; CMP, Lipid panel due  Medication reconciliation completed: No     Appointments  past 12m or future 3m with PCP    Date Provider   Last Visit   11/19/2021 Hung Reid MD   Next Visit   Visit date not found Hung Reid MD   ED visits in past 90 days: 0        Note composed:9:23 PM 11/26/2022

## 2022-11-28 ENCOUNTER — TELEPHONE (OUTPATIENT)
Dept: INTERNAL MEDICINE | Facility: CLINIC | Age: 37
End: 2022-11-28
Payer: COMMERCIAL

## 2022-12-08 ENCOUNTER — TELEPHONE (OUTPATIENT)
Dept: FAMILY MEDICINE | Facility: CLINIC | Age: 37
End: 2022-12-08
Payer: COMMERCIAL

## 2022-12-08 NOTE — TELEPHONE ENCOUNTER
Patient appointment scheduled incorrectly .I offered to reschedule appointment  Patient declined and did not wish to reschedule appointment. Please advise.

## 2022-12-15 ENCOUNTER — OFFICE VISIT (OUTPATIENT)
Dept: INTERNAL MEDICINE | Facility: CLINIC | Age: 37
End: 2022-12-15
Payer: COMMERCIAL

## 2022-12-15 DIAGNOSIS — J45.41 MODERATE PERSISTENT ASTHMA WITH ACUTE EXACERBATION: Primary | ICD-10-CM

## 2022-12-15 DIAGNOSIS — R05.9 COUGH, UNSPECIFIED TYPE: ICD-10-CM

## 2022-12-15 DIAGNOSIS — R06.02 SHORTNESS OF BREATH: ICD-10-CM

## 2022-12-15 PROCEDURE — 1160F PR REVIEW ALL MEDS BY PRESCRIBER/CLIN PHARMACIST DOCUMENTED: ICD-10-PCS | Mod: CPTII,95,, | Performed by: INTERNAL MEDICINE

## 2022-12-15 PROCEDURE — 1159F MED LIST DOCD IN RCRD: CPT | Mod: CPTII,95,, | Performed by: INTERNAL MEDICINE

## 2022-12-15 PROCEDURE — 99214 OFFICE O/P EST MOD 30 MIN: CPT | Mod: 95,,, | Performed by: INTERNAL MEDICINE

## 2022-12-15 PROCEDURE — 99214 PR OFFICE/OUTPT VISIT, EST, LEVL IV, 30-39 MIN: ICD-10-PCS | Mod: 95,,, | Performed by: INTERNAL MEDICINE

## 2022-12-15 PROCEDURE — 4010F ACE/ARB THERAPY RXD/TAKEN: CPT | Mod: CPTII,95,, | Performed by: INTERNAL MEDICINE

## 2022-12-15 PROCEDURE — 1159F PR MEDICATION LIST DOCUMENTED IN MEDICAL RECORD: ICD-10-PCS | Mod: CPTII,95,, | Performed by: INTERNAL MEDICINE

## 2022-12-15 PROCEDURE — 4010F PR ACE/ARB THEARPY RXD/TAKEN: ICD-10-PCS | Mod: CPTII,95,, | Performed by: INTERNAL MEDICINE

## 2022-12-15 PROCEDURE — 1160F RVW MEDS BY RX/DR IN RCRD: CPT | Mod: CPTII,95,, | Performed by: INTERNAL MEDICINE

## 2022-12-15 RX ORDER — IPRATROPIUM BROMIDE AND ALBUTEROL SULFATE 2.5; .5 MG/3ML; MG/3ML
3 SOLUTION RESPIRATORY (INHALATION) EVERY 6 HOURS PRN
Qty: 75 ML | Refills: 5 | Status: SHIPPED | OUTPATIENT
Start: 2022-12-15 | End: 2023-12-15

## 2022-12-15 RX ORDER — AZITHROMYCIN 250 MG/1
TABLET, FILM COATED ORAL
Qty: 6 TABLET | Refills: 0 | Status: SHIPPED | OUTPATIENT
Start: 2022-12-15 | End: 2022-12-20

## 2022-12-15 RX ORDER — PREDNISONE 20 MG/1
40 TABLET ORAL DAILY
Qty: 10 TABLET | Refills: 0 | Status: SHIPPED | OUTPATIENT
Start: 2022-12-15 | End: 2023-01-20

## 2022-12-15 NOTE — PROGRESS NOTES
The patient location is: home  The chief complaint leading to consultation is: cough    Visit type: audiovisual    Face to Face time with patient: 10 minutes  10 minutes of total time spent on the encounter, which includes face to face time and non-face to face time preparing to see the patient (eg, review of tests), Obtaining and/or reviewing separately obtained history, Documenting clinical information in the electronic or other health record, Independently interpreting results (not separately reported) and communicating results to the patient/family/caregiver, or Care coordination (not separately reported).     Each patient to whom he or she provides medical services by telemedicine is:  (1) informed of the relationship between the physician and patient and the respective role of any other health care provider with respect to management of the patient; and (2) notified that he or she may decline to receive medical services by telemedicine and may withdraw from such care at any time.    [facility] Note    Chief Complaint      Chief Complaint   Patient presents with    Cough     History of Present Illness      Roman Quinn is a 37 y.o. male who presents today for cough.  Patient comes to appointment alone.    Went out of town last week.  Came back with right ear feeling clogged, felt like it needed to pop. 4 days later, started with productive cough (5-7 days ago).  Now cough is dry.  Some sinus pressure, no sneezing/itchy or watery eyes. Had chills yesterday. No fever.  Has been wheezing, hx of asthma. SOB after walking up stairs, normally not SOB.  Using Breo, albuterol multiple times per day.  Had pneumomediastinum 3-4 years ago.    Problem List Items Addressed This Visit       Moderate persistent asthma with acute exacerbation - Primary    Relevant Medications    predniSONE (DELTASONE) 20 MG tablet    azithromycin (Z-JULIA) 250 MG tablet    albuterol-ipratropium (DUO-NEB) 2.5 mg-0.5 mg/3 mL  nebulizer solution     Other Visit Diagnoses       Cough, unspecified type        Relevant Medications    predniSONE (DELTASONE) 20 MG tablet    azithromycin (Z-JULIA) 250 MG tablet    albuterol-ipratropium (DUO-NEB) 2.5 mg-0.5 mg/3 mL nebulizer solution    Shortness of breath        Relevant Medications    predniSONE (DELTASONE) 20 MG tablet    azithromycin (Z-JULIA) 250 MG tablet    albuterol-ipratropium (DUO-NEB) 2.5 mg-0.5 mg/3 mL nebulizer solution            Health Maintenance   Topic Date Due    Hepatitis C Screening  Never done    Lipid Panel  11/12/2026    TETANUS VACCINE  03/07/2028       Past Medical History:   Diagnosis Date    Allergic conjunctivitis     Asthma     Hyperlipidemia     Hypertension        Past Surgical History:   Procedure Laterality Date    HERNIA REPAIR         family history includes Cancer in his maternal grandmother and paternal grandmother; Diabetes in his mother; No Known Problems in his father and sister.    Social History     Tobacco Use    Smoking status: Never    Smokeless tobacco: Never   Substance Use Topics    Alcohol use: Yes     Comment: 10 drinks per week. Wine, Beer or mixed drinks.    Drug use: No       Review of Systems   Constitutional:  Negative for chills and fever.   HENT:  Negative for sore throat.    Respiratory:  Positive for cough and shortness of breath.    Cardiovascular:  Negative for chest pain and palpitations.   Gastrointestinal:  Negative for constipation, diarrhea, nausea and vomiting.   Genitourinary:  Negative for dysuria and hematuria.   Musculoskeletal:  Negative for falls.   Neurological:  Negative for headaches.      Outpatient Encounter Medications as of 12/15/2022   Medication Sig Dispense Refill    albuterol (PROAIR HFA) 90 mcg/actuation inhaler Inhale 2 puffs into the lungs every 6 (six) hours as needed for Wheezing or Shortness of Breath. Rescue 18 g 11    albuterol-ipratropium (DUO-NEB) 2.5 mg-0.5 mg/3 mL nebulizer solution Take 3 mLs by  nebulization every 6 (six) hours as needed for Wheezing. Rescue 75 mL 5    amLODIPine (NORVASC) 5 MG tablet Take 1 tablet (5 mg total) by mouth once daily. 90 tablet 3    azithromycin (Z-JULIA) 250 MG tablet Take 2 tablets by mouth on day 1; Take 1 tablet by mouth on days 2-5 6 tablet 0    ergocalciferol (ERGOCALCIFEROL) 50,000 unit Cap TAKE 1 CAPSULE BY MOUTH EVERY 7 DAYS 4 capsule 2    fluticasone furoate-vilanteroL (BREO ELLIPTA) 100-25 mcg/dose diskus inhaler Inhale 1 puff into the lungs once daily. Controller 180 each 3    fluticasone propionate (FLONASE) 50 mcg/actuation nasal spray 2 sprays (100 mcg total) by Each Nostril route 2 (two) times a day. 48 mL 3    lisinopriL (PRINIVIL,ZESTRIL) 20 MG tablet Take 1 tablet (20 mg total) by mouth once daily. 90 tablet 3    predniSONE (DELTASONE) 20 MG tablet Take 2 tablets (40 mg total) by mouth once daily. 10 tablet 0    simvastatin (ZOCOR) 20 MG tablet TAKE 1 TABLET(20 MG) BY MOUTH EVERY EVENING 90 tablet 0     No facility-administered encounter medications on file as of 12/15/2022.        Review of patient's allergies indicates:   Allergen Reactions    Iodine and iodide containing products Anaphylaxis     Throat closing & itching.       Physical Exam       ]    Physical Exam  Constitutional:       General: He is not in acute distress.     Appearance: He is well-developed.   HENT:      Head: Normocephalic and atraumatic.   Pulmonary:      Effort: Pulmonary effort is normal.   Musculoskeletal:      Cervical back: Normal range of motion.   Skin:     Findings: No rash.   Neurological:      Mental Status: He is alert and oriented to person, place, and time.      Coordination: Coordination normal.   Psychiatric:         Behavior: Behavior normal.         Thought Content: Thought content normal.         Judgment: Judgment normal.        Laboratory:  CBC:  No results for input(s): WBC, RBC, HGB, HCT, PLT, MCV, MCH, MCHC in the last 2160 hours.  CMP:  No results for input(s):  GLU, CALCIUM, ALBUMIN, PROT, NA, K, CO2, CL, BUN, ALKPHOS, ALT, AST, BILITOT in the last 2160 hours.    Invalid input(s): CREATININ  URINALYSIS:  No results for input(s): COLORU, CLARITYU, SPECGRAV, PHUR, PROTEINUA, GLUCOSEU, BILIRUBINCON, BLOODU, WBCU, RBCU, BACTERIA, MUCUS, NITRITE, LEUKOCYTESUR, UROBILINOGEN, HYALINECASTS in the last 2160 hours.   LIPIDS:  No results for input(s): TSH, HDL, CHOL, TRIG, LDLCALC, CHOLHDL, NONHDLCHOL, TOTALCHOLEST in the last 2160 hours.  TSH:  No results for input(s): TSH in the last 2160 hours.  A1C:  No results for input(s): HGBA1C in the last 2160 hours.    Radiology:  No results found in the last 30 days.     Assessment/Plan     Roman Quinn is a 37 y.o.male with:    1. Moderate persistent asthma with acute exacerbation  - predniSONE (DELTASONE) 20 MG tablet; Take 2 tablets (40 mg total) by mouth once daily.  Dispense: 10 tablet; Refill: 0  - azithromycin (Z-JULIA) 250 MG tablet; Take 2 tablets by mouth on day 1; Take 1 tablet by mouth on days 2-5  Dispense: 6 tablet; Refill: 0  - albuterol-ipratropium (DUO-NEB) 2.5 mg-0.5 mg/3 mL nebulizer solution; Take 3 mLs by nebulization every 6 (six) hours as needed for Wheezing. Rescue  Dispense: 75 mL; Refill: 5    2. Cough, unspecified type  - predniSONE (DELTASONE) 20 MG tablet; Take 2 tablets (40 mg total) by mouth once daily.  Dispense: 10 tablet; Refill: 0  - azithromycin (Z-JULIA) 250 MG tablet; Take 2 tablets by mouth on day 1; Take 1 tablet by mouth on days 2-5  Dispense: 6 tablet; Refill: 0  - albuterol-ipratropium (DUO-NEB) 2.5 mg-0.5 mg/3 mL nebulizer solution; Take 3 mLs by nebulization every 6 (six) hours as needed for Wheezing. Rescue  Dispense: 75 mL; Refill: 5    3. Shortness of breath  - predniSONE (DELTASONE) 20 MG tablet; Take 2 tablets (40 mg total) by mouth once daily.  Dispense: 10 tablet; Refill: 0  - azithromycin (Z-JULIA) 250 MG tablet; Take 2 tablets by mouth on day 1; Take 1 tablet by mouth on days 2-5   Dispense: 6 tablet; Refill: 0  - albuterol-ipratropium (DUO-NEB) 2.5 mg-0.5 mg/3 mL nebulizer solution; Take 3 mLs by nebulization every 6 (six) hours as needed for Wheezing. Rescue  Dispense: 75 mL; Refill: 5      -Continue current medications and maintain follow up with specialists.    -Follow up if symptoms worsen or fail to improve.       Jodi Valdes MD  [facility]

## 2022-12-19 ENCOUNTER — PATIENT MESSAGE (OUTPATIENT)
Dept: INTERNAL MEDICINE | Facility: CLINIC | Age: 37
End: 2022-12-19
Payer: COMMERCIAL

## 2022-12-19 RX ORDER — OSELTAMIVIR PHOSPHATE 75 MG/1
75 CAPSULE ORAL DAILY
Qty: 10 CAPSULE | Refills: 0 | Status: SHIPPED | OUTPATIENT
Start: 2022-12-19 | End: 2022-12-24

## 2023-01-13 ENCOUNTER — LAB VISIT (OUTPATIENT)
Dept: LAB | Facility: HOSPITAL | Age: 38
End: 2023-01-13
Attending: FAMILY MEDICINE
Payer: COMMERCIAL

## 2023-01-13 DIAGNOSIS — Z00.00 WELL ADULT EXAM: ICD-10-CM

## 2023-01-13 DIAGNOSIS — E55.9 VITAMIN D INSUFFICIENCY: ICD-10-CM

## 2023-01-13 DIAGNOSIS — R73.01 IMPAIRED FASTING GLUCOSE: ICD-10-CM

## 2023-01-13 DIAGNOSIS — E78.49 OTHER HYPERLIPIDEMIA: Chronic | ICD-10-CM

## 2023-01-13 LAB
25(OH)D3+25(OH)D2 SERPL-MCNC: 21 NG/ML (ref 30–96)
ALBUMIN SERPL BCP-MCNC: 4.3 G/DL (ref 3.5–5.2)
ALP SERPL-CCNC: 67 U/L (ref 55–135)
ALT SERPL W/O P-5'-P-CCNC: 25 U/L (ref 10–44)
ANION GAP SERPL CALC-SCNC: 9 MMOL/L (ref 8–16)
AST SERPL-CCNC: 29 U/L (ref 10–40)
BASOPHILS # BLD AUTO: 0.07 K/UL (ref 0–0.2)
BASOPHILS NFR BLD: 1.1 % (ref 0–1.9)
BILIRUB SERPL-MCNC: 0.6 MG/DL (ref 0.1–1)
BUN SERPL-MCNC: 19 MG/DL (ref 6–20)
CALCIUM SERPL-MCNC: 9.5 MG/DL (ref 8.7–10.5)
CHLORIDE SERPL-SCNC: 103 MMOL/L (ref 95–110)
CHOLEST SERPL-MCNC: 233 MG/DL (ref 120–199)
CHOLEST/HDLC SERPL: 5 {RATIO} (ref 2–5)
CO2 SERPL-SCNC: 25 MMOL/L (ref 23–29)
CREAT SERPL-MCNC: 0.9 MG/DL (ref 0.5–1.4)
DIFFERENTIAL METHOD: NORMAL
EOSINOPHIL # BLD AUTO: 0.2 K/UL (ref 0–0.5)
EOSINOPHIL NFR BLD: 3.5 % (ref 0–8)
ERYTHROCYTE [DISTWIDTH] IN BLOOD BY AUTOMATED COUNT: 13.1 % (ref 11.5–14.5)
EST. GFR  (NO RACE VARIABLE): >60 ML/MIN/1.73 M^2
ESTIMATED AVG GLUCOSE: 105 MG/DL (ref 68–131)
GLUCOSE SERPL-MCNC: 117 MG/DL (ref 70–110)
HBA1C MFR BLD: 5.3 % (ref 4–5.6)
HCT VFR BLD AUTO: 48.3 % (ref 40–54)
HDLC SERPL-MCNC: 47 MG/DL (ref 40–75)
HDLC SERPL: 20.2 % (ref 20–50)
HGB BLD-MCNC: 15.6 G/DL (ref 14–18)
IMM GRANULOCYTES # BLD AUTO: 0 K/UL (ref 0–0.04)
IMM GRANULOCYTES NFR BLD AUTO: 0 % (ref 0–0.5)
LDLC SERPL CALC-MCNC: 169 MG/DL (ref 63–159)
LYMPHOCYTES # BLD AUTO: 2.1 K/UL (ref 1–4.8)
LYMPHOCYTES NFR BLD: 33.5 % (ref 18–48)
MCH RBC QN AUTO: 29.4 PG (ref 27–31)
MCHC RBC AUTO-ENTMCNC: 32.3 G/DL (ref 32–36)
MCV RBC AUTO: 91 FL (ref 82–98)
MONOCYTES # BLD AUTO: 0.5 K/UL (ref 0.3–1)
MONOCYTES NFR BLD: 8.1 % (ref 4–15)
NEUTROPHILS # BLD AUTO: 3.4 K/UL (ref 1.8–7.7)
NEUTROPHILS NFR BLD: 53.8 % (ref 38–73)
NONHDLC SERPL-MCNC: 186 MG/DL
NRBC BLD-RTO: 0 /100 WBC
PLATELET # BLD AUTO: 285 K/UL (ref 150–450)
PMV BLD AUTO: 10.4 FL (ref 9.2–12.9)
POTASSIUM SERPL-SCNC: 4.4 MMOL/L (ref 3.5–5.1)
PROT SERPL-MCNC: 7.5 G/DL (ref 6–8.4)
RBC # BLD AUTO: 5.3 M/UL (ref 4.6–6.2)
SODIUM SERPL-SCNC: 137 MMOL/L (ref 136–145)
T4 FREE SERPL-MCNC: 1.03 NG/DL (ref 0.71–1.51)
TRIGL SERPL-MCNC: 85 MG/DL (ref 30–150)
TSH SERPL DL<=0.005 MIU/L-ACNC: 1.14 UIU/ML (ref 0.4–4)
WBC # BLD AUTO: 6.26 K/UL (ref 3.9–12.7)

## 2023-01-13 PROCEDURE — 85025 COMPLETE CBC W/AUTO DIFF WBC: CPT | Performed by: FAMILY MEDICINE

## 2023-01-13 PROCEDURE — 83036 HEMOGLOBIN GLYCOSYLATED A1C: CPT | Performed by: FAMILY MEDICINE

## 2023-01-13 PROCEDURE — 84443 ASSAY THYROID STIM HORMONE: CPT | Performed by: FAMILY MEDICINE

## 2023-01-13 PROCEDURE — 80061 LIPID PANEL: CPT | Performed by: FAMILY MEDICINE

## 2023-01-13 PROCEDURE — 80053 COMPREHEN METABOLIC PANEL: CPT | Performed by: FAMILY MEDICINE

## 2023-01-13 PROCEDURE — 82306 VITAMIN D 25 HYDROXY: CPT | Performed by: FAMILY MEDICINE

## 2023-01-13 PROCEDURE — 84439 ASSAY OF FREE THYROXINE: CPT | Performed by: FAMILY MEDICINE

## 2023-01-13 PROCEDURE — 36415 COLL VENOUS BLD VENIPUNCTURE: CPT | Mod: PO | Performed by: FAMILY MEDICINE

## 2023-01-20 ENCOUNTER — OFFICE VISIT (OUTPATIENT)
Dept: INTERNAL MEDICINE | Facility: CLINIC | Age: 38
End: 2023-01-20
Payer: COMMERCIAL

## 2023-01-20 VITALS
TEMPERATURE: 97 F | SYSTOLIC BLOOD PRESSURE: 128 MMHG | BODY MASS INDEX: 35.23 KG/M2 | WEIGHT: 260.13 LBS | RESPIRATION RATE: 16 BRPM | DIASTOLIC BLOOD PRESSURE: 82 MMHG | HEART RATE: 72 BPM | HEIGHT: 72 IN

## 2023-01-20 DIAGNOSIS — G44.019 EPISODIC CLUSTER HEADACHE, NOT INTRACTABLE: ICD-10-CM

## 2023-01-20 DIAGNOSIS — J45.41 MODERATE PERSISTENT ASTHMA WITH ACUTE EXACERBATION: ICD-10-CM

## 2023-01-20 DIAGNOSIS — Z00.00 WELL ADULT EXAM: Primary | ICD-10-CM

## 2023-01-20 DIAGNOSIS — E55.9 VITAMIN D INSUFFICIENCY: ICD-10-CM

## 2023-01-20 DIAGNOSIS — E78.49 OTHER HYPERLIPIDEMIA: Chronic | ICD-10-CM

## 2023-01-20 DIAGNOSIS — I10 ESSENTIAL HYPERTENSION: Chronic | ICD-10-CM

## 2023-01-20 DIAGNOSIS — E66.01 SEVERE OBESITY (BMI 35.0-35.9 WITH COMORBIDITY): ICD-10-CM

## 2023-01-20 PROCEDURE — 3074F SYST BP LT 130 MM HG: CPT | Mod: CPTII,S$GLB,, | Performed by: FAMILY MEDICINE

## 2023-01-20 PROCEDURE — 4010F PR ACE/ARB THEARPY RXD/TAKEN: ICD-10-PCS | Mod: CPTII,S$GLB,, | Performed by: FAMILY MEDICINE

## 2023-01-20 PROCEDURE — 3044F PR MOST RECENT HEMOGLOBIN A1C LEVEL <7.0%: ICD-10-PCS | Mod: CPTII,S$GLB,, | Performed by: FAMILY MEDICINE

## 2023-01-20 PROCEDURE — 3008F BODY MASS INDEX DOCD: CPT | Mod: CPTII,S$GLB,, | Performed by: FAMILY MEDICINE

## 2023-01-20 PROCEDURE — 1159F MED LIST DOCD IN RCRD: CPT | Mod: CPTII,S$GLB,, | Performed by: FAMILY MEDICINE

## 2023-01-20 PROCEDURE — 99999 PR PBB SHADOW E&M-EST. PATIENT-LVL V: ICD-10-PCS | Mod: PBBFAC,,, | Performed by: FAMILY MEDICINE

## 2023-01-20 PROCEDURE — 99999 PR PBB SHADOW E&M-EST. PATIENT-LVL V: CPT | Mod: PBBFAC,,, | Performed by: FAMILY MEDICINE

## 2023-01-20 PROCEDURE — 3079F DIAST BP 80-89 MM HG: CPT | Mod: CPTII,S$GLB,, | Performed by: FAMILY MEDICINE

## 2023-01-20 PROCEDURE — 3079F PR MOST RECENT DIASTOLIC BLOOD PRESSURE 80-89 MM HG: ICD-10-PCS | Mod: CPTII,S$GLB,, | Performed by: FAMILY MEDICINE

## 2023-01-20 PROCEDURE — 4010F ACE/ARB THERAPY RXD/TAKEN: CPT | Mod: CPTII,S$GLB,, | Performed by: FAMILY MEDICINE

## 2023-01-20 PROCEDURE — 3044F HG A1C LEVEL LT 7.0%: CPT | Mod: CPTII,S$GLB,, | Performed by: FAMILY MEDICINE

## 2023-01-20 PROCEDURE — 1160F RVW MEDS BY RX/DR IN RCRD: CPT | Mod: CPTII,S$GLB,, | Performed by: FAMILY MEDICINE

## 2023-01-20 PROCEDURE — 1159F PR MEDICATION LIST DOCUMENTED IN MEDICAL RECORD: ICD-10-PCS | Mod: CPTII,S$GLB,, | Performed by: FAMILY MEDICINE

## 2023-01-20 PROCEDURE — 99395 PR PREVENTIVE VISIT,EST,18-39: ICD-10-PCS | Mod: S$GLB,,, | Performed by: FAMILY MEDICINE

## 2023-01-20 PROCEDURE — 99395 PREV VISIT EST AGE 18-39: CPT | Mod: S$GLB,,, | Performed by: FAMILY MEDICINE

## 2023-01-20 PROCEDURE — 3074F PR MOST RECENT SYSTOLIC BLOOD PRESSURE < 130 MM HG: ICD-10-PCS | Mod: CPTII,S$GLB,, | Performed by: FAMILY MEDICINE

## 2023-01-20 PROCEDURE — 3008F PR BODY MASS INDEX (BMI) DOCUMENTED: ICD-10-PCS | Mod: CPTII,S$GLB,, | Performed by: FAMILY MEDICINE

## 2023-01-20 PROCEDURE — 1160F PR REVIEW ALL MEDS BY PRESCRIBER/CLIN PHARMACIST DOCUMENTED: ICD-10-PCS | Mod: CPTII,S$GLB,, | Performed by: FAMILY MEDICINE

## 2023-01-20 RX ORDER — SIMVASTATIN 40 MG/1
40 TABLET, FILM COATED ORAL NIGHTLY
Qty: 90 TABLET | Refills: 3 | Status: SHIPPED | OUTPATIENT
Start: 2023-01-20 | End: 2023-11-30 | Stop reason: SDUPTHER

## 2023-01-20 NOTE — PROGRESS NOTES
Subjective:       Patient ID: Roman Quinn is a 37 y.o. male.    Chief Complaint: Annual Exam (See labs to revu.   Recently have cluster headaches)    HPI 37-year-old white male presents to clinic today for annual physical exam.  Hypertension remains well controlled on amlodipine 5 mg daily and lisinopril 20 mg daily.  He continues to be treated for hyperlipidemia with simvastatin 20 mg daily; however, over the past year LDL cholesterol has elevated.  I have recommended increasing simvastatin to 40 mg daily and continuing to follow a low-fat diet.  Asthma remains well controlled on Breo 1 inhalation daily.  He reports a past surgical history of hernia repair.  He reports a family history of his mother having diabetes.  He is up-to-date with all vaccinations.  Finally, he reports an episode of cluster headaches for which he does use Excedrin migraine with improvement of symptoms.  He notes that over the past 3 years he has had an episode each year.  He has become concerned secondary to the recurrence of these episodes.  Review of Systems   Constitutional:  Negative for appetite change, chills, fatigue and fever.   HENT:  Negative for nasal congestion, ear pain, hearing loss, postnasal drip, rhinorrhea, sinus pressure/congestion, sore throat and tinnitus.    Eyes:  Negative for redness, itching and visual disturbance.   Respiratory:  Negative for cough, chest tightness and shortness of breath.    Cardiovascular:  Negative for chest pain and palpitations.   Gastrointestinal:  Negative for abdominal pain, constipation, diarrhea, nausea and vomiting.   Genitourinary:  Negative for decreased urine volume, difficulty urinating, dysuria, frequency, hematuria and urgency.   Musculoskeletal:  Negative for back pain, myalgias, neck pain and neck stiffness.   Integumentary:  Negative for rash.   Neurological:  Positive for headaches. Negative for dizziness and light-headedness.   Psychiatric/Behavioral: Negative.          Objective:      Physical Exam  Vitals and nursing note reviewed.   Constitutional:       General: He is not in acute distress.     Appearance: Normal appearance. He is well-developed. He is not diaphoretic.   HENT:      Head: Normocephalic and atraumatic.      Right Ear: External ear normal.      Left Ear: External ear normal.      Nose: Nose normal.      Mouth/Throat:      Pharynx: No oropharyngeal exudate.   Eyes:      General: No scleral icterus.        Right eye: No discharge.         Left eye: No discharge.      Conjunctiva/sclera: Conjunctivae normal.      Pupils: Pupils are equal, round, and reactive to light.   Neck:      Thyroid: No thyromegaly.      Vascular: No JVD.      Trachea: No tracheal deviation.   Cardiovascular:      Rate and Rhythm: Normal rate and regular rhythm.      Heart sounds: Normal heart sounds. No murmur heard.    No friction rub. No gallop.   Pulmonary:      Effort: Pulmonary effort is normal. No respiratory distress.      Breath sounds: Normal breath sounds. No stridor. No wheezing, rhonchi or rales.   Chest:      Chest wall: No tenderness.   Abdominal:      General: Bowel sounds are normal. There is no distension.      Palpations: Abdomen is soft. There is no mass.      Tenderness: There is no abdominal tenderness. There is no guarding or rebound.   Musculoskeletal:         General: No tenderness. Normal range of motion.      Cervical back: Normal range of motion and neck supple.   Lymphadenopathy:      Cervical: No cervical adenopathy.   Skin:     General: Skin is warm and dry.      Coloration: Skin is not pale.      Findings: No erythema or rash.   Neurological:      Mental Status: He is alert and oriented to person, place, and time.   Psychiatric:         Mood and Affect: Mood normal.         Behavior: Behavior normal.         Thought Content: Thought content normal.         Judgment: Judgment normal.       Assessment:       Problem List Items Addressed This Visit       Essential  hypertension (Chronic)    Hyperlipidemia (Chronic)    Relevant Medications    simvastatin (ZOCOR) 40 MG tablet    Other Relevant Orders    Comprehensive Metabolic Panel    Lipid Panel    Moderate persistent asthma with acute exacerbation    Severe obesity (BMI 35.0-35.9 with comorbidity)    Vitamin D insufficiency     Other Visit Diagnoses       Well adult exam    -  Primary    Episodic cluster headache, not intractable        Relevant Orders    Ambulatory referral/consult to Neurology            Plan:         1. Labs have been reviewed and are overall within normal limits except for an elevated cholesterol level.  2. Continue lisinopril 20 mg daily and amlodipine 5 mg daily.  Hypertension is well controlled.  3. Increase simvastatin to 40 mg daily and repeat CMP and fasting lipid panel in 3 months.  4. Continue Breo as prescribed.  Asthma is stable.  5. Continue over-the-counter vitamin-D 2000 units daily.  Vitamin-D insufficiency remains stable.    6. Refer to neurology for further evaluation of cluster headaches.  7. Encourage diet and exercise.    8. Return to clinic as needed or in 1 year for annual physical exam.

## 2023-01-22 NOTE — PROGRESS NOTES
Neurology Clinic Note      Date: 1/23/23  Patient Name: Roman Quinn   MRN: [id number]   PCP: Hung Reid  Referring Provider: Hung Reid MD    Assessment and Plan:   Roman Quinn is a 37 y.o. male presenting for evaluation of episodic right-sided headaches.  He does not fit the classic description of a migraine or TAC. He was concerned about the possibility of cluster headaches.  I explained to him that aside from the location and the circadian/cirannual pattern, he does not have any autonomic symptoms to warrant a diagnosis of cluster headaches.  I do feel an MRI of the brain with and without contrast would be helpful in ruling out any occult underlying etiology.    For pain control, I recommended continuing Excedrin as it seems to be working well.  He was instructed to inform me immediately if there is any change in the nature or pattern of his headaches or if he develops any new associated symptoms.    I will see him again after the MRI.      Problem List Items Addressed This Visit    None  Visit Diagnoses       Nonintractable episodic headache, unspecified headache type    -  Primary    Relevant Orders    MRI Brain W WO Contrast    Episodic cluster headache, not intractable                  Subjective:          HPI:   Mr. Roman Quinn is a 37 y.o. male with a history of HTN and HLD presenting for evaluation of headaches.     Reports episodic right-sided headaches located in the right temporal region in a ' golf ball' area of distribution.  Over the last two weeks, he has had 3 attacks; the first 2 were severe and the last one was milder.  They typically occur during the night or early morning and last about 30-45 minutes.  Excedrin works well in aborting the headache.  Associated with photophobia.  He denies any nausea/vomiting, phonophobia or autonomic symptoms.  No associated vision loss, double vision or blurry vision.  He also informs me that he has had similar headaches for the  last 2 years, usually occurring during the same time of the year, around the same two week period.  Prior to the onset of his headaches, he was treated for a ruptured blood vessel in his right eye.  He had no visual symptoms from this and this was reportedly found incidentally during a routine follow-up.  He has been doing well at his ophthalmology follow ups since then.      PAST MEDICAL HISTORY:  Past Medical History:   Diagnosis Date    Allergic conjunctivitis     Asthma     Hyperlipidemia     Hypertension        PAST SURGICAL HISTORY:  Past Surgical History:   Procedure Laterality Date    HERNIA REPAIR         CURRENT MEDS:  Current Outpatient Medications   Medication Sig Dispense Refill    albuterol (PROAIR HFA) 90 mcg/actuation inhaler Inhale 2 puffs into the lungs every 6 (six) hours as needed for Wheezing or Shortness of Breath. Rescue 18 g 11    albuterol-ipratropium (DUO-NEB) 2.5 mg-0.5 mg/3 mL nebulizer solution Take 3 mLs by nebulization every 6 (six) hours as needed for Wheezing. Rescue 75 mL 5    amLODIPine (NORVASC) 5 MG tablet TAKE 1 TABLET(5 MG) BY MOUTH EVERY DAY 90 tablet 3    fluticasone furoate-vilanteroL (BREO) 100-25 mcg/dose diskus inhaler INHALE 1 PUFF INTO THE LUNGS EVERY  each 3    fluticasone propionate (FLONASE) 50 mcg/actuation nasal spray 2 sprays (100 mcg total) by Each Nostril route 2 (two) times a day. 48 mL 3    lisinopriL (PRINIVIL,ZESTRIL) 20 MG tablet TAKE 1 TABLET(20 MG) BY MOUTH EVERY DAY 90 tablet 3    simvastatin (ZOCOR) 40 MG tablet Take 1 tablet (40 mg total) by mouth every evening. 90 tablet 3     No current facility-administered medications for this visit.       ALLERGIES:  Review of patient's allergies indicates:   Allergen Reactions    Iodine and iodide containing products Anaphylaxis     Throat closing & itching.       FAMILY HISTORY:  Family History   Problem Relation Age of Onset    Diabetes Mother     Cancer Maternal Grandmother     Cancer Paternal  Grandmother     No Known Problems Father     No Known Problems Sister     Skin cancer Neg Hx     Melanoma Neg Hx     Allergic rhinitis Neg Hx     Asthma Neg Hx     Immunodeficiency Neg Hx     Rhinitis Neg Hx     Urticaria Neg Hx     Eczema Neg Hx     Atopy Neg Hx     Angioedema Neg Hx     Allergies Neg Hx        SOCIAL HISTORY:  Social History     Tobacco Use    Smoking status: Never    Smokeless tobacco: Never   Substance Use Topics    Alcohol use: Yes     Comment: 10 drinks per week. Wine, Beer or mixed drinks.    Drug use: No       Review of Systems:  12 system review of systems is negative except for the symptoms mentioned in HPI.      Objective:     Vitals:    01/23/23 1458   BP: 124/82   BP Location: Right arm   Patient Position: Sitting   BP Method: Large (Automatic)   Pulse: 106   Weight: 118 kg (260 lb 2.3 oz)   Height: 6' (1.829 m)     General: NAD, well nourished   Eyes: no tearing, discharge, no erythema    Neck: Supple, full range of motion  Cardiovascular: Warm and well perfused  Lungs: Normal work of breathing  Skin: No rash, lesions, or breakdown on exposed skin  Psychiatry: Mood and affect are appropriate       NEUROLOGICAL EXAMINATION:     MENTAL STATUS   Oriented to person, place, and time.   Follows 2 step commands.   Speech: speech is normal   Level of consciousness: alert    CRANIAL NERVES     CN II   Visual fields full to confrontation.     CN III, IV, VI   Pupils are equal, round, and reactive to light.  Extraocular motions are normal.   CN III: no CN III palsy  Nystagmus: none   Diplopia: none  Ophthalmoparesis: none  Upgaze: normal  Downgaze: normal    CN V   Facial sensation intact.     CN VII   Facial expression full, symmetric.     CN XII   CN XII normal.        Optic discs full.     MOTOR EXAM   Right arm pronator drift: absent  Left arm pronator drift: absent    Strength   Right deltoid: 5/5  Left deltoid: 5/5  Right biceps: 5/5  Left biceps: 5/5  Right triceps: 5/5  Left triceps:  5/5  Right iliopsoas: 5/5  Left iliopsoas: 5/5  Right quadriceps: 5/5  Left quadriceps: 5/5  Right hamstrin/5  Left hamstrin/5  Right glutei: 5/5  Left glutei: 5/5  Right anterior tibial: 5/5  Left anterior tibial: 5/5  Right gastroc: 5/5  Left gastroc: 5/5    REFLEXES     Reflexes   Right brachioradialis: 2+  Left brachioradialis: 2+  Right biceps: 2+  Left biceps: 2+  Right patellar: 2+  Left patellar: 2+    SENSORY EXAM   Light touch normal.     GAIT AND COORDINATION      Coordination   Finger to nose coordination: normal    Rusty Vang MD  Department of Neurology  [facility]

## 2023-01-23 ENCOUNTER — OFFICE VISIT (OUTPATIENT)
Dept: NEUROLOGY | Facility: CLINIC | Age: 38
End: 2023-01-23
Payer: COMMERCIAL

## 2023-01-23 VITALS
HEIGHT: 72 IN | HEART RATE: 106 BPM | DIASTOLIC BLOOD PRESSURE: 82 MMHG | WEIGHT: 260.13 LBS | BODY MASS INDEX: 35.23 KG/M2 | SYSTOLIC BLOOD PRESSURE: 124 MMHG

## 2023-01-23 DIAGNOSIS — R51.9 NONINTRACTABLE EPISODIC HEADACHE, UNSPECIFIED HEADACHE TYPE: Primary | ICD-10-CM

## 2023-01-23 DIAGNOSIS — G44.019 EPISODIC CLUSTER HEADACHE, NOT INTRACTABLE: ICD-10-CM

## 2023-01-23 PROCEDURE — 3074F PR MOST RECENT SYSTOLIC BLOOD PRESSURE < 130 MM HG: ICD-10-PCS | Mod: CPTII,S$GLB,, | Performed by: STUDENT IN AN ORGANIZED HEALTH CARE EDUCATION/TRAINING PROGRAM

## 2023-01-23 PROCEDURE — 99999 PR PBB SHADOW E&M-EST. PATIENT-LVL III: CPT | Mod: PBBFAC,,, | Performed by: STUDENT IN AN ORGANIZED HEALTH CARE EDUCATION/TRAINING PROGRAM

## 2023-01-23 PROCEDURE — 99999 PR PBB SHADOW E&M-EST. PATIENT-LVL III: ICD-10-PCS | Mod: PBBFAC,,, | Performed by: STUDENT IN AN ORGANIZED HEALTH CARE EDUCATION/TRAINING PROGRAM

## 2023-01-23 PROCEDURE — 99203 PR OFFICE/OUTPT VISIT, NEW, LEVL III, 30-44 MIN: ICD-10-PCS | Mod: S$GLB,,, | Performed by: STUDENT IN AN ORGANIZED HEALTH CARE EDUCATION/TRAINING PROGRAM

## 2023-01-23 PROCEDURE — 1159F MED LIST DOCD IN RCRD: CPT | Mod: CPTII,S$GLB,, | Performed by: STUDENT IN AN ORGANIZED HEALTH CARE EDUCATION/TRAINING PROGRAM

## 2023-01-23 PROCEDURE — 3074F SYST BP LT 130 MM HG: CPT | Mod: CPTII,S$GLB,, | Performed by: STUDENT IN AN ORGANIZED HEALTH CARE EDUCATION/TRAINING PROGRAM

## 2023-01-23 PROCEDURE — 4010F ACE/ARB THERAPY RXD/TAKEN: CPT | Mod: CPTII,S$GLB,, | Performed by: STUDENT IN AN ORGANIZED HEALTH CARE EDUCATION/TRAINING PROGRAM

## 2023-01-23 PROCEDURE — 1159F PR MEDICATION LIST DOCUMENTED IN MEDICAL RECORD: ICD-10-PCS | Mod: CPTII,S$GLB,, | Performed by: STUDENT IN AN ORGANIZED HEALTH CARE EDUCATION/TRAINING PROGRAM

## 2023-01-23 PROCEDURE — 99203 OFFICE O/P NEW LOW 30 MIN: CPT | Mod: S$GLB,,, | Performed by: STUDENT IN AN ORGANIZED HEALTH CARE EDUCATION/TRAINING PROGRAM

## 2023-01-23 PROCEDURE — 3079F PR MOST RECENT DIASTOLIC BLOOD PRESSURE 80-89 MM HG: ICD-10-PCS | Mod: CPTII,S$GLB,, | Performed by: STUDENT IN AN ORGANIZED HEALTH CARE EDUCATION/TRAINING PROGRAM

## 2023-01-23 PROCEDURE — 3008F PR BODY MASS INDEX (BMI) DOCUMENTED: ICD-10-PCS | Mod: CPTII,S$GLB,, | Performed by: STUDENT IN AN ORGANIZED HEALTH CARE EDUCATION/TRAINING PROGRAM

## 2023-01-23 PROCEDURE — 4010F PR ACE/ARB THEARPY RXD/TAKEN: ICD-10-PCS | Mod: CPTII,S$GLB,, | Performed by: STUDENT IN AN ORGANIZED HEALTH CARE EDUCATION/TRAINING PROGRAM

## 2023-01-23 PROCEDURE — 3079F DIAST BP 80-89 MM HG: CPT | Mod: CPTII,S$GLB,, | Performed by: STUDENT IN AN ORGANIZED HEALTH CARE EDUCATION/TRAINING PROGRAM

## 2023-01-23 PROCEDURE — 3044F PR MOST RECENT HEMOGLOBIN A1C LEVEL <7.0%: ICD-10-PCS | Mod: CPTII,S$GLB,, | Performed by: STUDENT IN AN ORGANIZED HEALTH CARE EDUCATION/TRAINING PROGRAM

## 2023-01-23 PROCEDURE — 3044F HG A1C LEVEL LT 7.0%: CPT | Mod: CPTII,S$GLB,, | Performed by: STUDENT IN AN ORGANIZED HEALTH CARE EDUCATION/TRAINING PROGRAM

## 2023-01-23 PROCEDURE — 3008F BODY MASS INDEX DOCD: CPT | Mod: CPTII,S$GLB,, | Performed by: STUDENT IN AN ORGANIZED HEALTH CARE EDUCATION/TRAINING PROGRAM

## 2023-02-14 ENCOUNTER — TELEPHONE (OUTPATIENT)
Dept: NEUROLOGY | Facility: CLINIC | Age: 38
End: 2023-02-14
Payer: COMMERCIAL

## 2023-02-14 NOTE — TELEPHONE ENCOUNTER
----- Message from Kilye Ley MA sent at 2/14/2023 10:39 AM CST -----  Type:  Patient Returning Call    Who Called: pt insurance  Does the patient know what this is regarding?: yes  Would the patient rather a call back or a response via [employer]?  Call back   Best Call Back Number: [phone]   Additional Information:  order for Mri brain W and WO contrast needs to be faxed to [phone] due to pt going to [facility] ([facility])

## 2023-02-15 ENCOUNTER — OFFICE VISIT (OUTPATIENT)
Dept: UROLOGY | Facility: CLINIC | Age: 38
End: 2023-02-15
Payer: COMMERCIAL

## 2023-02-15 VITALS
BODY MASS INDEX: 35.23 KG/M2 | DIASTOLIC BLOOD PRESSURE: 91 MMHG | HEART RATE: 93 BPM | SYSTOLIC BLOOD PRESSURE: 130 MMHG | HEIGHT: 72 IN | WEIGHT: 260.13 LBS

## 2023-02-15 DIAGNOSIS — Z30.2 ENCOUNTER FOR MALE STERILIZATION PROCEDURE: Primary | ICD-10-CM

## 2023-02-15 PROBLEM — T79.7XXA SUBCUTANEOUS EMPHYSEMA: Status: RESOLVED | Noted: 2018-10-17 | Resolved: 2023-02-15

## 2023-02-15 PROCEDURE — 4010F ACE/ARB THERAPY RXD/TAKEN: CPT | Mod: CPTII,S$GLB,, | Performed by: UROLOGY

## 2023-02-15 PROCEDURE — 1159F PR MEDICATION LIST DOCUMENTED IN MEDICAL RECORD: ICD-10-PCS | Mod: CPTII,S$GLB,, | Performed by: UROLOGY

## 2023-02-15 PROCEDURE — 1160F RVW MEDS BY RX/DR IN RCRD: CPT | Mod: CPTII,S$GLB,, | Performed by: UROLOGY

## 2023-02-15 PROCEDURE — 99999 PR PBB SHADOW E&M-EST. PATIENT-LVL IV: CPT | Mod: PBBFAC,,, | Performed by: UROLOGY

## 2023-02-15 PROCEDURE — 99204 OFFICE O/P NEW MOD 45 MIN: CPT | Mod: S$GLB,,, | Performed by: UROLOGY

## 2023-02-15 PROCEDURE — 3075F PR MOST RECENT SYSTOLIC BLOOD PRESS GE 130-139MM HG: ICD-10-PCS | Mod: CPTII,S$GLB,, | Performed by: UROLOGY

## 2023-02-15 PROCEDURE — 3080F PR MOST RECENT DIASTOLIC BLOOD PRESSURE >= 90 MM HG: ICD-10-PCS | Mod: CPTII,S$GLB,, | Performed by: UROLOGY

## 2023-02-15 PROCEDURE — 3080F DIAST BP >= 90 MM HG: CPT | Mod: CPTII,S$GLB,, | Performed by: UROLOGY

## 2023-02-15 PROCEDURE — 3044F PR MOST RECENT HEMOGLOBIN A1C LEVEL <7.0%: ICD-10-PCS | Mod: CPTII,S$GLB,, | Performed by: UROLOGY

## 2023-02-15 PROCEDURE — 3075F SYST BP GE 130 - 139MM HG: CPT | Mod: CPTII,S$GLB,, | Performed by: UROLOGY

## 2023-02-15 PROCEDURE — 1160F PR REVIEW ALL MEDS BY PRESCRIBER/CLIN PHARMACIST DOCUMENTED: ICD-10-PCS | Mod: CPTII,S$GLB,, | Performed by: UROLOGY

## 2023-02-15 PROCEDURE — 4010F PR ACE/ARB THEARPY RXD/TAKEN: ICD-10-PCS | Mod: CPTII,S$GLB,, | Performed by: UROLOGY

## 2023-02-15 PROCEDURE — 3008F PR BODY MASS INDEX (BMI) DOCUMENTED: ICD-10-PCS | Mod: CPTII,S$GLB,, | Performed by: UROLOGY

## 2023-02-15 PROCEDURE — 99204 PR OFFICE/OUTPT VISIT, NEW, LEVL IV, 45-59 MIN: ICD-10-PCS | Mod: S$GLB,,, | Performed by: UROLOGY

## 2023-02-15 PROCEDURE — 3044F HG A1C LEVEL LT 7.0%: CPT | Mod: CPTII,S$GLB,, | Performed by: UROLOGY

## 2023-02-15 PROCEDURE — 3008F BODY MASS INDEX DOCD: CPT | Mod: CPTII,S$GLB,, | Performed by: UROLOGY

## 2023-02-15 PROCEDURE — 99999 PR PBB SHADOW E&M-EST. PATIENT-LVL IV: ICD-10-PCS | Mod: PBBFAC,,, | Performed by: UROLOGY

## 2023-02-15 PROCEDURE — 1159F MED LIST DOCD IN RCRD: CPT | Mod: CPTII,S$GLB,, | Performed by: UROLOGY

## 2023-02-15 RX ORDER — DIAZEPAM 5 MG/1
TABLET ORAL
Qty: 2 TABLET | Refills: 0 | Status: SHIPPED | OUTPATIENT
Start: 2023-02-15

## 2023-02-15 RX ORDER — LIDOCAINE HYDROCHLORIDE 10 MG/ML
20 INJECTION INFILTRATION; PERINEURAL ONCE
Status: COMPLETED | OUTPATIENT
Start: 2023-03-17 | End: 2023-03-17

## 2023-02-15 NOTE — PROGRESS NOTES
Chief Complaint:   Undesired fertility    HPI:  Mr. Quinn is a 37 y.o.  male who presents for evaluation re vasectomy. He has 2 children, ages 2 and 3 yo, and he is certain that he desires no more. He is aware of other forms of contraception.  He's currently using the pill for contraception. He is aware that vasectomy is to be considered permanent/irreversible.    He denies orchalgia.  No dysuria.  No hematuria.    ROS:  Rmoan Quinn denies headache, blurred vision, fever, nausea, vomiting, chills, abdominal pain, chest pain, sore throat, bleeding per rectum, cough, SOB, recent loss of consciousness, recent mental status changes, seizures, dizziness, or upper or lower extremity weakness.    Past Medical History    Past Medical History:   Diagnosis Date    Allergic conjunctivitis     Asthma     Hyperlipidemia     Hypertension        Past Surgical History    Past Surgical History:   Procedure Laterality Date    HERNIA REPAIR         Social History    Social History     Socioeconomic History    Marital status:    Occupational History    Occupation: [employer]     Employer: [employer]     Employer: [employer]   Tobacco Use    Smoking status: Never    Smokeless tobacco: Never   Substance and Sexual Activity    Alcohol use: Yes     Comment: 10 drinks per week. Wine, Beer or mixed drinks.    Drug use: No    Sexual activity: Yes     Partners: Female     Social Determinants of Health     Financial Resource Strain: Low Risk     Difficulty of Paying Living Expenses: Not very hard   Food Insecurity: No Food Insecurity    Worried About Running Out of Food in the Last Year: Never true    Ran Out of Food in the Last Year: Never true   Transportation Needs: No Transportation Needs    Lack of Transportation (Medical): No    Lack of Transportation (Non-Medical): No   Physical Activity: Insufficiently Active    Days of Exercise per Week: 3 days    Minutes of Exercise per Session: 30 min   Stress: Stress Concern Present     Feeling of Stress : To some extent   Social Connections: Unknown    Frequency of Communication with Friends and Family: Three times a week    Frequency of Social Gatherings with Friends and Family: Once a week    Active Member of Clubs or Organizations: Yes    Attends Club or Organization Meetings: More than 4 times per year    Marital Status:    Housing Stability: Low Risk     Unable to Pay for Housing in the Last Year: No    Number of Places Lived in the Last Year: 1    Unstable Housing in the Last Year: No       Family History  Family History   Problem Relation Age of Onset    Diabetes Mother     Cancer Maternal Grandmother     Cancer Paternal Grandmother     No Known Problems Father     No Known Problems Sister     Skin cancer Neg Hx     Melanoma Neg Hx     Allergic rhinitis Neg Hx     Asthma Neg Hx     Immunodeficiency Neg Hx     Rhinitis Neg Hx     Urticaria Neg Hx     Eczema Neg Hx     Atopy Neg Hx     Angioedema Neg Hx     Allergies Neg Hx          Medications    Current Outpatient Medications:     albuterol (PROAIR HFA) 90 mcg/actuation inhaler, Inhale 2 puffs into the lungs every 6 (six) hours as needed for Wheezing or Shortness of Breath. Rescue, Disp: 18 g, Rfl: 11    albuterol-ipratropium (DUO-NEB) 2.5 mg-0.5 mg/3 mL nebulizer solution, Take 3 mLs by nebulization every 6 (six) hours as needed for Wheezing. Rescue, Disp: 75 mL, Rfl: 5    amLODIPine (NORVASC) 5 MG tablet, TAKE 1 TABLET(5 MG) BY MOUTH EVERY DAY, Disp: 90 tablet, Rfl: 3    fluticasone furoate-vilanteroL (BREO) 100-25 mcg/dose diskus inhaler, INHALE 1 PUFF INTO THE LUNGS EVERY DAY, Disp: 180 each, Rfl: 3    fluticasone propionate (FLONASE) 50 mcg/actuation nasal spray, 2 sprays (100 mcg total) by Each Nostril route 2 (two) times a day., Disp: 48 mL, Rfl: 3    lisinopriL (PRINIVIL,ZESTRIL) 20 MG tablet, TAKE 1 TABLET(20 MG) BY MOUTH EVERY DAY, Disp: 90 tablet, Rfl: 3    simvastatin (ZOCOR) 40 MG tablet, Take 1 tablet (40 mg total)  by mouth every evening., Disp: 90 tablet, Rfl: 3    Allergies  Review of patient's allergies indicates:   Allergen Reactions    Iodine and iodide containing products Anaphylaxis     Throat closing & itching.         ?  PHYSICAL EXAM:    The patient generally appears in good health, is appropriately interactive, and is in no apparent distress.     Eyes: anicteric sclerae, moist conjunctivae; no lid-lag; PERRLA     HENT: Atraumatic; oropharynx clear with moist mucous membranes and no mucosal ulcerations;normal hard and soft palate.  No evidence of lymphadenopathy.    Neck: Trachea midline.  No thyromegaly.    Skin: No lesions.    Mental: Cooperative with normal affect.  Is oriented to time, place, and person.    Neuro: Grossly intact.    Chest: Normal inspiratory effort.   No accessory muscles.  No audible wheezes.  Respirations symmetric on inspiration and expiration.    Heart: Regular rhythm.      Abdomen:  Soft, non-tender. No masses or organomegaly. Bladder is not palpable. No evidence of flank discomfort. No evidence of inguinal hernia.    Genitourinary: The penis has no evidence of plaques or induration. The urethral meatus is normal. The testes, epididymides, and cord structures are normal in size and contour bilaterally. The scrotum is normal in size and contour.    Extremities: No clubbing, cyanosis, or edema          A/P:  Roman Quinn is a 37 y.o. male who presents for evaluation re vasectomy.    1.I discussed with the patient risks and benefits, as well as alternatives. We discussed possible complications at length, including fever, infection, bleeding--possibly requiring reoperation,testicular injury or atrophy with loss of function, chronic pain, persistent or recurrent presence of sperm in the ejaculate, vasal recanalization, as well as the risks attendant to the anesthetic.  2.We discussed that he should stop aspirin, ibuprofen, and similar products at least one week prior to the procedure.  Acetominophen is okay to use for headaches, pain, etc.  3. He should bring an athletic supporter and loose fitting sweat pants on the day of the procedure.  4. We discussed that he must continue to use contraception until two consecutive semen analyses (checked at approximately 4-6 weeks post-vasectomy) reveal azoospermia.  5. He will schedule an elective vasectomy.     No

## 2023-02-15 NOTE — PATIENT INSTRUCTIONS
Having a Vasectomy: Before, During, and After the Procedure  Vasectomy is an outpatient (same day) procedure. It can be done in a doctors office, clinic, or hospital. Your doctor will talk with you about preparing for surgery. He or she will also discuss the possible risks and complications with you. After the procedure, follow all your doctors advice for recovery.  Preparing for Surgery      The cut ends of the vas may be tied, closed with a clip, or sealed by heat (cauterized).    Your doctor will talk with you about getting ready for surgery. You may be asked to do the following:  Sign a consent form. This must be done at least a few days before surgery. It gives your doctor permission to do the procedure. It also states that a vasectomy is not guaranteed to make you sterile.  Dont take aspirin, ibuprofen, or naproxen for 1 week before surgery or 1 week after. These medications can cause bleeding after the procedure. Also, tell your doctor if you take any medications, supplements, or herbal remedies.  Tell your doctor if youve had any prior scrotal surgery.  Arrange for an adult family member or friend to give you a ride home after surgery.  Shower and clean your scrotum the day of surgery. Your doctor may also ask you to shave your scrotum.  Bring an athletic supporter (jockstrap) and a pair of loose fitting pants to the doctors office or hospital.  Eat something with sugar before surgery.  During Surgery  The entire procedure usually lasts less than 30 minutes.  Youll be asked to undress and lie on a table.  To prevent pain during surgery, youll be given an injection of local anesthetic in your scrotum or lower groin.  Once the area is numb, one or two small incisions are made in the scrotum.   The vas deferens are lifted through the incision and cut. The ends of the vas are then sealed off using one of several methods.  If needed, the incision is closed with stitches.  You can rest for a while until  youre ready to go home.  Recovering at Home  For about a week, your scrotum may look bruised and slightly swollen. You may also have a small amount of bloody discharge from the incision. This is normal.  To help make your recovery more comfortable, follow the tips below.  Stay off your feet as much as possible for the first 2 days.   Wear an athletic supporter or snug cotton briefs for support.  Ice the area for 24 hours  Take medications with acetaminophen (such as Tylenol) to relieve any discomfort. Dont use aspirin, ibuprofen, or naproxen.  Avoid heavy lifting, exercise, or intercourse for 7 days.  Remember: You must use another form of birth control until youre completely sterile.  Call your doctor if you notice any of the following after surgery:   Increasing pain or swelling in your scrotum  A large black-and-blue area, or a growing lump  Fever or chills  Increasing redness or drainage of the incision  Trouble urinating    Sex After Vasectomy  Vasectomy doesnt change your sexual function. So when you start having sex again, it should feel the same as before. A vasectomy also shouldnt affect your relationship with your partner. Its important to remember, though, that you wont become sterile right away. It will take time before you can have sex without the need for birth control.  Until youre sterile: After a vasectomy, some active sperm still remain in your semen. It will take time and many ejaculations before the sperm are completely gone. During this period, you must use another birth control method to prevent pregnancy. To make sure no sperm are left in your semen, youll need to have one or more semen exams. You usually collect a semen sample at home and bring it to a lab. The sample is then checked under a microscope. Youre sterile only when these samples show no evidence of sperm. Ask your doctor whether additional follow-up is needed.  After youre sterile: After your doctor tells you youre  sterile, you no longer need to use any form of birth control. Youre free to have sex without the fear of unwanted pregnancy. However, a vasectomy does not protect you from sexually transmitted diseases (STDs). If you have more than one sex partner, be sure to practice safer sex by using condoms.  © 7202-7939 Cedric Dawson, [address] [place] [address], [place] [address]. All rights reserved. This information is not intended as a substitute for professional medical care. Always follow your healthcare professional's instructions.    Vasectomy: Risks and Complications  A vasectomy is an outpatient procedure. This means youll go home the same day. Its done in a doctors office, clinic, or hospital. Before your procedure, youll be asked to read and sign a consent form. This form states youre aware of the possible risks and complications. It also says that you understand that the procedure, though most often successful, cant promise to make you sterile. Be sure you have all your questions answered before you sign this form. Below is a list of risks and possible complications of the procedure.  Risks and Possible Complications of Vasectomy   Vasectomy is safe. But it does have risks. They include the following:  Bleeding or infection.  Sperm granuloma. This is a small, harmless lump. It may form where the vas deferens is sealed off.  Loss of Testicle  Epididymitis.This is inflammation that may cause scrotal aching. It often goes away without treatment. Anti-inflammatory medications can provide relief.  Reconnection of the vas deferens. This can occur in rare cases. It makes you fertile again. This can result in an unwanted pregnancy.  Sperm antibodies.These are a common response of the body to absorbed sperm. The antibodies can make you sterile. This is true even if you later try to reverse your vasectomy.  Long-term testicular discomfort.This may occur after surgery. But its very rare.    © 2796-2321 Cedric Dawson,  [address] [place] [address]. All rights reserved. This information is not intended as a substitute for professional medical care. Always follow your healthcare professional's instructions.

## 2023-02-28 NOTE — PROGRESS NOTES
Neurology Clinic Note      The patient location is:  [place]  The chief complaint leading to consultation is:  Headache    Visit type: audiovisual    Face to Face time with patient: 10 mins  12 minutes of total time spent on the encounter, which includes face to face time and non-face to face time preparing to see the patient (eg, review of tests), Obtaining and/or reviewing separately obtained history, Documenting clinical information in the electronic or other health record, Independently interpreting results (not separately reported) and communicating results to the patient/family/caregiver, or Care coordination (not separately reported).     Each patient to whom he or she provides medical services by telemedicine is:  (1) informed of the relationship between the physician and patient and the respective role of any other health care provider with respect to management of the patient; and (2) notified that he or she may decline to receive medical services by telemedicine and may withdraw from such care at any time.      Date: 3/1/23  Patient Name: Roman Quinn   MRN: [id number]   PCP: Hung Reid  Referring Provider: No ref. provider found    Assessment and Plan:   Roman Quinn is a 37 y.o. male presenting for follow-up for headaches.  He has previously seen me for episodic right headaches with a cirannual pattern temporal without any associated migrainous or autonomic features.  I requested him to send me a CD of his MRI for me to review.  Since his headaches have resolved, no further changes in management.  Encouraged to get in touch with me if his headaches recur or he has any new symptoms.      Problem List Items Addressed This Visit          Neuro    Nonintractable episodic headache - Primary         Subjective:       Interval History (3/1/2023):    He has been doing well.  No further headaches since his last visit.  MRI was done at outside facility.       HP (1/23/20232):   Mr. Roman Ulloa  Kia is a 37 y.o. male with a history of HTN and HLD presenting for evaluation of headaches.      Reports episodic right-sided headaches located in the right temporal region in a ' golf ball' area of distribution.  Over the last two weeks, he has had 3 attacks; the first 2 were severe and the last one was milder.  They typically occur during the night or early morning and last about 30-45 minutes.  Excedrin works well in aborting the headache.  Associated with photophobia.  He denies any nausea/vomiting, phonophobia or autonomic symptoms.  No associated vision loss, double vision or blurry vision.  He also informs me that he has had similar headaches for the last 2 years, usually occurring during the same time of the year, around the same two week period.  Prior to the onset of his headaches, he was treated for a ruptured blood vessel in his right eye.  He had no visual symptoms from this and this was reportedly found incidentally during a routine follow-up.  He has been doing well at his ophthalmology follow ups since then.    PAST MEDICAL HISTORY:  Past Medical History:   Diagnosis Date    Allergic conjunctivitis     Asthma     Hyperlipidemia     Hypertension        PAST SURGICAL HISTORY:  Past Surgical History:   Procedure Laterality Date    HERNIA REPAIR         CURRENT MEDS:  Current Outpatient Medications   Medication Sig Dispense Refill    albuterol (PROVENTIL/VENTOLIN HFA) 90 mcg/actuation inhaler Inhale 2 puffs into the lungs every 6 (six) hours as needed for Wheezing or Shortness of Breath. Rescue 54 g 3    albuterol-ipratropium (DUO-NEB) 2.5 mg-0.5 mg/3 mL nebulizer solution Take 3 mLs by nebulization every 6 (six) hours as needed for Wheezing. Rescue 75 mL 5    amLODIPine (NORVASC) 5 MG tablet TAKE 1 TABLET(5 MG) BY MOUTH EVERY DAY 90 tablet 3    diazePAM (VALIUM) 5 MG tablet Take 2 pills 30 min prior to procedure 2 tablet 0    fluticasone furoate-vilanteroL (BREO) 100-25 mcg/dose diskus inhaler INHALE  1 PUFF INTO THE LUNGS EVERY  each 3    fluticasone propionate (FLONASE) 50 mcg/actuation nasal spray 2 sprays (100 mcg total) by Each Nostril route 2 (two) times a day. 48 mL 3    lisinopriL (PRINIVIL,ZESTRIL) 20 MG tablet TAKE 1 TABLET(20 MG) BY MOUTH EVERY DAY 90 tablet 3    simvastatin (ZOCOR) 40 MG tablet Take 1 tablet (40 mg total) by mouth every evening. 90 tablet 3     Current Facility-Administered Medications   Medication Dose Route Frequency Provider Last Rate Last Admin    [START ON 3/17/2023] LIDOcaine HCL 10 mg/ml (1%) injection 20 mL  20 mL Other Once Chris Pink MD           ALLERGIES:  Review of patient's allergies indicates:   Allergen Reactions    Iodine and iodide containing products Anaphylaxis     Throat closing & itching.       FAMILY HISTORY:  Family History   Problem Relation Age of Onset    Diabetes Mother     Cancer Maternal Grandmother     Cancer Paternal Grandmother     No Known Problems Father     No Known Problems Sister     Skin cancer Neg Hx     Melanoma Neg Hx     Allergic rhinitis Neg Hx     Asthma Neg Hx     Immunodeficiency Neg Hx     Rhinitis Neg Hx     Urticaria Neg Hx     Eczema Neg Hx     Atopy Neg Hx     Angioedema Neg Hx     Allergies Neg Hx        SOCIAL HISTORY:  Social History     Tobacco Use    Smoking status: Never    Smokeless tobacco: Never   Substance Use Topics    Alcohol use: Yes     Comment: 10 drinks per week. Wine, Beer or mixed drinks.    Drug use: No       Review of Systems:  12 system review of systems is negative except for the symptoms mentioned in HPI.      Objective:   There were no vitals filed for this visit.    Exam limited -  televideo visit    General: Well-developed, well-groomed. No apparent distress  Eyes: Anicteric, noninjected.  ENT: Normocephalic, atraumatic.    Respiratory: No respiratory distress.    Cardiovascular: Deferred - televideo visit  Abdomen: Deferred - televideo visit  Skin: No rashes, or lesions, nodules on exposed  areas    Neurologic Exam  The patient is awake, alert and oriented. Language is fluent.  Fund of knowledge and attention are appropriate, able to provide detailed medical history.    Cranial nerves:   Pupils are round.  Ocular motility is full in all cardinal positions of gaze.   Facial activation is symmetric.   Shoulder elevation is symmetric.  Tongue protrudes midline.  Exam limited 2/2 televideo visit.    Motor examination   Normal bulk in BUE  No pronator drift.  Exam limited 2/2 televideo visit.    Sensory examination   Deferred - televideo visit    Deep tendon reflexes  Deferred - televideo visit    Gait:   Deferred - televideo visit    Coordination: Finger to nose is normal bilaterally.  Rapid finger movements intact b/l.         This is a consult performed through audio-visual using [employer] pepe. Patient and provider are in different locations. History and physical exam are limited due to the nature of this encounter.       Rusty Vang MD  Department of Neurology  [facility]

## 2023-03-01 ENCOUNTER — OFFICE VISIT (OUTPATIENT)
Dept: NEUROLOGY | Facility: CLINIC | Age: 38
End: 2023-03-01
Payer: COMMERCIAL

## 2023-03-01 ENCOUNTER — PATIENT MESSAGE (OUTPATIENT)
Dept: NEUROLOGY | Facility: CLINIC | Age: 38
End: 2023-03-01

## 2023-03-01 DIAGNOSIS — R51.9 NONINTRACTABLE EPISODIC HEADACHE, UNSPECIFIED HEADACHE TYPE: Primary | ICD-10-CM

## 2023-03-01 PROCEDURE — 3044F PR MOST RECENT HEMOGLOBIN A1C LEVEL <7.0%: ICD-10-PCS | Mod: CPTII,95,, | Performed by: STUDENT IN AN ORGANIZED HEALTH CARE EDUCATION/TRAINING PROGRAM

## 2023-03-01 PROCEDURE — 4010F PR ACE/ARB THEARPY RXD/TAKEN: ICD-10-PCS | Mod: CPTII,95,, | Performed by: STUDENT IN AN ORGANIZED HEALTH CARE EDUCATION/TRAINING PROGRAM

## 2023-03-01 PROCEDURE — 99212 PR OFFICE/OUTPT VISIT, EST, LEVL II, 10-19 MIN: ICD-10-PCS | Mod: 95,,, | Performed by: STUDENT IN AN ORGANIZED HEALTH CARE EDUCATION/TRAINING PROGRAM

## 2023-03-01 PROCEDURE — 99212 OFFICE O/P EST SF 10 MIN: CPT | Mod: 95,,, | Performed by: STUDENT IN AN ORGANIZED HEALTH CARE EDUCATION/TRAINING PROGRAM

## 2023-03-01 PROCEDURE — 4010F ACE/ARB THERAPY RXD/TAKEN: CPT | Mod: CPTII,95,, | Performed by: STUDENT IN AN ORGANIZED HEALTH CARE EDUCATION/TRAINING PROGRAM

## 2023-03-01 PROCEDURE — 3044F HG A1C LEVEL LT 7.0%: CPT | Mod: CPTII,95,, | Performed by: STUDENT IN AN ORGANIZED HEALTH CARE EDUCATION/TRAINING PROGRAM

## 2023-03-17 ENCOUNTER — PROCEDURE VISIT (OUTPATIENT)
Dept: UROLOGY | Facility: CLINIC | Age: 38
End: 2023-03-17
Payer: COMMERCIAL

## 2023-03-17 VITALS
DIASTOLIC BLOOD PRESSURE: 90 MMHG | SYSTOLIC BLOOD PRESSURE: 139 MMHG | RESPIRATION RATE: 16 BRPM | BODY MASS INDEX: 35.21 KG/M2 | WEIGHT: 260 LBS | HEIGHT: 72 IN | HEART RATE: 108 BPM

## 2023-03-17 DIAGNOSIS — Z30.2 ENCOUNTER FOR MALE STERILIZATION PROCEDURE: ICD-10-CM

## 2023-03-17 PROCEDURE — 55250 REMOVAL OF SPERM DUCT(S): CPT | Mod: S$GLB,,, | Performed by: UROLOGY

## 2023-03-17 PROCEDURE — 55250 PR REMOVAL OF SPERM DUCT(S): ICD-10-PCS | Mod: S$GLB,,, | Performed by: UROLOGY

## 2023-03-17 RX ORDER — OXYCODONE AND ACETAMINOPHEN 5; 325 MG/1; MG/1
1 TABLET ORAL EVERY 4 HOURS PRN
Qty: 8 TABLET | Refills: 0 | Status: SHIPPED | OUTPATIENT
Start: 2023-03-17 | End: 2023-03-27

## 2023-03-17 RX ORDER — CEPHALEXIN 500 MG/1
500 CAPSULE ORAL 4 TIMES DAILY
Qty: 8 CAPSULE | Refills: 0 | Status: SHIPPED | OUTPATIENT
Start: 2023-03-17 | End: 2023-03-19

## 2023-03-17 RX ADMIN — LIDOCAINE HYDROCHLORIDE 20 ML: 10 INJECTION INFILTRATION; PERINEURAL at 01:03

## 2023-03-17 NOTE — PATIENT INSTRUCTIONS
What to Expect After a Vasectomy  You cannot drive or operate heavy machinery on the day of the procedure. Begin prescription antibiotics today and take as directed until finished. Dr. Pink will give you a prescription for a narcotic pain medication. You may choose to take the prescription medication or Tylenol only for minor discomfort. Do not take any NSAIDS (e.g., Motrin, Naprosyn, etc.) or aspirin for at least one week, as these products can thin the blood.    Apply ice packs to the scrotal area for 24-48 hours. Avoid direct contact of the ice pack with the skin. Scrotal supports, jock straps, or fitted underwear help elevate the scrotum and reduce discomfort.    You may shower the next day. Gently apply soapy water to the scrotum to wash. Rinse and dry yourself by blotting the skin, not rubbing.    Avoid strenuous physical exercises or sexual relations for at least one week after a vasectomy.    Continue to use birth control for at least 6-8 weeks or 20 ejaculations. You are still considered fertile until your urologist examines a post-vasectomy semen analysis after 20 ejaculations and a second one a few days after the first. Drop off the specimens at the 4th floor [facility] department between 8am and 4pm.    Do NOT resume unprotected sexual activity until your physician finds no sperm in your semen.    All stitches will dissolve on their own in 1-2 weeks.    Signs and Symptoms to Report  A large amount of bleeding at the site.  An unusual amount of pain.  A large amount of swelling in the scrotum.  Fever and chills.  Any signs of infection, such as redness at the site or foul-smelling discharge    Risks  The risks of complication after vasectomy are very low.    A few of the risks include:  Bleeding.  Infection.  Scrotal hematoma - a collection of blood in the scrotum.  Inflammation of the epididymis - inflammation of a structure next to the testicle that helps in maturation of the sperm.  Sperm  granuloma - a collection of sperm that leaks out from the vas deferens, forming a small nodule or lump. This does not usually cause any discomfort, but you may feel it in the scrotum.  Recanalization - the restoration of the lumen or transport tube between the two ends of the vas deferens, possibly causing fertility.    If you have any questions or concerns, please call Dr. Pink at [phone] during regular office hours. If there are any problems after hours or on weekends, you may call [phone] and ask for the urology resident on call.

## 2023-03-17 NOTE — PROCEDURES
Procedures Date: 03/17/2023     Pre procedure diagnosis: male sterilization    Post procedure diagnosis:same    Surgeon: Apryl    Assistant: None    Procedure performed: Bilateral vasectomy    Blood loss: Min.    Specimen: None    Procedure in detail:  After informed consent was obtained, the patient was placed in the supine position.  The skin was sterilized with a prep.  Attention was then turned to the patient's left hemiscrotum.    The vas was isolated on this side.  Local anesthesia was applied with 1% lidocaine.  The skin overlying the vas was incised sharply.  The vas was then isolated and grasped with a ring forceps.  It was brought through the incision.  The adventia overlying the vas was incised sharply.  The vas was then doubly clamped with a hemostat.  The vas was divided between the two hemostats with a 15 blade scalpel.    The ends of the vas were cauterized and tied with 2-0 silk sutures.  Two sutures were placed on each side. Electrocautery was used to obtain hemostasis.  A fascial interposition was then done with a 3-0 chromic.    The wound was then closed with a 3-0 chromic.    Attention was then turned to the right hemiscrotum and the exact same procedure was done. The vas was isolated on this side.  Local anesthesia was applied with 1% lidocaine.  The skin overlying the vas was incised sharply.  The vas was then isolated and grasped with a ring forceps.  It was brought through the incision.  The adventia overlying the vas was incised sharply.  The vas was then doubly clamped with a hemostat.  The vas was divided between the two hemostats with a 15 blade scalpel.    The ends of the vas were cauterized and tied with 2-0 silk sutures.  Two sutures were placed on each side. Electrocautery was used to obtain hemostasis.  A fascial interposition was then done with a 3-0 chromic.    The wound was then closed with a 3-0 chromic.    He tolerated the procedure well without complication.  He was advised  to continue contraception until he brings in 2 semen samples that show no sperm.  He is also to avoid lifting, strenuous exercise, intercourse, NSAIDS, and ASA for 1 week.  He will be discharged home is stable condition.  He is to follow up prn.

## 2023-03-31 RX ORDER — SIMVASTATIN 20 MG/1
TABLET, FILM COATED ORAL
Qty: 90 TABLET | Refills: 0 | OUTPATIENT
Start: 2023-03-31

## 2023-04-01 NOTE — TELEPHONE ENCOUNTER
Refill Decision Note   Roman Quinn  is requesting a refill authorization.  Brief Assessment and Rationale for Refill:  Quick Discontinue     Medication Therapy Plan:  Simvastatin increased to 40mg (1/20/23); Quick dc      Comments:     Note composed:7:42 PM 03/31/2023

## 2023-04-20 ENCOUNTER — LAB VISIT (OUTPATIENT)
Dept: LAB | Facility: HOSPITAL | Age: 38
End: 2023-04-20
Attending: FAMILY MEDICINE
Payer: COMMERCIAL

## 2023-04-20 DIAGNOSIS — E78.49 OTHER HYPERLIPIDEMIA: Chronic | ICD-10-CM

## 2023-04-20 LAB
ALBUMIN SERPL BCP-MCNC: 4.2 G/DL (ref 3.5–5.2)
ALP SERPL-CCNC: 67 U/L (ref 55–135)
ALT SERPL W/O P-5'-P-CCNC: 23 U/L (ref 10–44)
ANION GAP SERPL CALC-SCNC: 8 MMOL/L (ref 8–16)
AST SERPL-CCNC: 30 U/L (ref 10–40)
BILIRUB SERPL-MCNC: 0.4 MG/DL (ref 0.1–1)
BUN SERPL-MCNC: 19 MG/DL (ref 6–20)
CALCIUM SERPL-MCNC: 9.4 MG/DL (ref 8.7–10.5)
CHLORIDE SERPL-SCNC: 106 MMOL/L (ref 95–110)
CHOLEST SERPL-MCNC: 178 MG/DL (ref 120–199)
CHOLEST/HDLC SERPL: 3.9 {RATIO} (ref 2–5)
CO2 SERPL-SCNC: 27 MMOL/L (ref 23–29)
CREAT SERPL-MCNC: 1 MG/DL (ref 0.5–1.4)
EST. GFR  (NO RACE VARIABLE): >60 ML/MIN/1.73 M^2
GLUCOSE SERPL-MCNC: 115 MG/DL (ref 70–110)
HDLC SERPL-MCNC: 46 MG/DL (ref 40–75)
HDLC SERPL: 25.8 % (ref 20–50)
LDLC SERPL CALC-MCNC: 117.2 MG/DL (ref 63–159)
NONHDLC SERPL-MCNC: 132 MG/DL
POTASSIUM SERPL-SCNC: 5.1 MMOL/L (ref 3.5–5.1)
PROT SERPL-MCNC: 7.3 G/DL (ref 6–8.4)
SODIUM SERPL-SCNC: 141 MMOL/L (ref 136–145)
TRIGL SERPL-MCNC: 74 MG/DL (ref 30–150)

## 2023-04-20 PROCEDURE — 36415 COLL VENOUS BLD VENIPUNCTURE: CPT | Mod: PO | Performed by: FAMILY MEDICINE

## 2023-04-20 PROCEDURE — 80053 COMPREHEN METABOLIC PANEL: CPT | Performed by: FAMILY MEDICINE

## 2023-04-20 PROCEDURE — 80061 LIPID PANEL: CPT | Performed by: FAMILY MEDICINE

## 2023-05-07 ENCOUNTER — PATIENT MESSAGE (OUTPATIENT)
Dept: UROLOGY | Facility: CLINIC | Age: 38
End: 2023-05-07
Payer: COMMERCIAL

## 2023-05-10 ENCOUNTER — TELEPHONE (OUTPATIENT)
Dept: UROLOGY | Facility: CLINIC | Age: 38
End: 2023-05-10
Payer: COMMERCIAL

## 2023-05-10 NOTE — TELEPHONE ENCOUNTER
Patient was informed that his post vas specimen showed no sperm.  He was instructed to wait at least 1 week before submitting another sample.  He was instructed to have multiple ejaculations in the meantime and continue to use some form of birth control until he is cleared. He voiced understanding.

## 2023-05-19 ENCOUNTER — TELEPHONE (OUTPATIENT)
Dept: UROLOGY | Facility: CLINIC | Age: 38
End: 2023-05-19
Payer: COMMERCIAL

## 2023-09-25 DIAGNOSIS — M25.521 RIGHT ELBOW PAIN: Primary | ICD-10-CM

## 2023-09-26 ENCOUNTER — HOSPITAL ENCOUNTER (OUTPATIENT)
Dept: RADIOLOGY | Facility: HOSPITAL | Age: 38
Discharge: HOME | End: 2023-09-26
Attending: ORTHOPAEDIC SURGERY
Payer: COMMERCIAL

## 2023-09-26 DIAGNOSIS — M25.521 RIGHT ELBOW PAIN: ICD-10-CM

## 2023-09-26 PROCEDURE — 73080 X-RAY EXAM OF ELBOW: CPT | Mod: TC,PO,RT

## 2023-09-26 PROCEDURE — 73080 XR ELBOW COMPLETE 3 VIEW RIGHT: ICD-10-PCS | Mod: 26,RT,, | Performed by: RADIOLOGY

## 2023-09-26 PROCEDURE — 73080 X-RAY EXAM OF ELBOW: CPT | Mod: 26,RT,, | Performed by: RADIOLOGY

## 2023-09-27 ENCOUNTER — OFFICE VISIT (OUTPATIENT)
Dept: ORTHOPEDICS | Facility: CLINIC | Age: 38
End: 2023-09-27
Payer: COMMERCIAL

## 2023-09-27 DIAGNOSIS — M25.521 RIGHT ELBOW PAIN: ICD-10-CM

## 2023-09-27 DIAGNOSIS — M77.11 RIGHT LATERAL EPICONDYLITIS: Primary | ICD-10-CM

## 2023-09-27 PROCEDURE — 20605 DRAIN/INJ JOINT/BURSA W/O US: CPT | Mod: RT,S$GLB,, | Performed by: ORTHOPAEDIC SURGERY

## 2023-09-27 PROCEDURE — 3044F PR MOST RECENT HEMOGLOBIN A1C LEVEL <7.0%: ICD-10-PCS | Mod: CPTII,S$GLB,, | Performed by: ORTHOPAEDIC SURGERY

## 2023-09-27 PROCEDURE — 4010F ACE/ARB THERAPY RXD/TAKEN: CPT | Mod: CPTII,S$GLB,, | Performed by: ORTHOPAEDIC SURGERY

## 2023-09-27 PROCEDURE — 99204 PR OFFICE/OUTPT VISIT, NEW, LEVL IV, 45-59 MIN: ICD-10-PCS | Mod: 25,S$GLB,, | Performed by: ORTHOPAEDIC SURGERY

## 2023-09-27 PROCEDURE — 1160F RVW MEDS BY RX/DR IN RCRD: CPT | Mod: CPTII,S$GLB,, | Performed by: ORTHOPAEDIC SURGERY

## 2023-09-27 PROCEDURE — 1159F PR MEDICATION LIST DOCUMENTED IN MEDICAL RECORD: ICD-10-PCS | Mod: CPTII,S$GLB,, | Performed by: ORTHOPAEDIC SURGERY

## 2023-09-27 PROCEDURE — 99204 OFFICE O/P NEW MOD 45 MIN: CPT | Mod: 25,S$GLB,, | Performed by: ORTHOPAEDIC SURGERY

## 2023-09-27 PROCEDURE — 99999 PR PBB SHADOW E&M-EST. PATIENT-LVL III: CPT | Mod: PBBFAC,,, | Performed by: ORTHOPAEDIC SURGERY

## 2023-09-27 PROCEDURE — 3044F HG A1C LEVEL LT 7.0%: CPT | Mod: CPTII,S$GLB,, | Performed by: ORTHOPAEDIC SURGERY

## 2023-09-27 PROCEDURE — 1159F MED LIST DOCD IN RCRD: CPT | Mod: CPTII,S$GLB,, | Performed by: ORTHOPAEDIC SURGERY

## 2023-09-27 PROCEDURE — 1160F PR REVIEW ALL MEDS BY PRESCRIBER/CLIN PHARMACIST DOCUMENTED: ICD-10-PCS | Mod: CPTII,S$GLB,, | Performed by: ORTHOPAEDIC SURGERY

## 2023-09-27 PROCEDURE — 99999 PR PBB SHADOW E&M-EST. PATIENT-LVL III: ICD-10-PCS | Mod: PBBFAC,,, | Performed by: ORTHOPAEDIC SURGERY

## 2023-09-27 PROCEDURE — 4010F PR ACE/ARB THEARPY RXD/TAKEN: ICD-10-PCS | Mod: CPTII,S$GLB,, | Performed by: ORTHOPAEDIC SURGERY

## 2023-09-27 PROCEDURE — 20605 INTERMEDIATE JOINT ASPIRATION/INJECTION: ICD-10-PCS | Mod: RT,S$GLB,, | Performed by: ORTHOPAEDIC SURGERY

## 2023-09-27 RX ORDER — METHYLPREDNISOLONE ACETATE 40 MG/ML
40 INJECTION, SUSPENSION INTRA-ARTICULAR; INTRALESIONAL; INTRAMUSCULAR; SOFT TISSUE
Status: DISCONTINUED | OUTPATIENT
Start: 2023-09-27 | End: 2023-09-27 | Stop reason: HOSPADM

## 2023-09-27 RX ADMIN — METHYLPREDNISOLONE ACETATE 40 MG: 40 INJECTION, SUSPENSION INTRA-ARTICULAR; INTRALESIONAL; INTRAMUSCULAR; SOFT TISSUE at 03:09

## 2023-09-27 NOTE — PROCEDURES
R lateral epicondyleIntermediate Joint Aspiration/Injection    Date/Time: 9/27/2023 3:45 PM    Performed by: Elizabet Tobin MD  Authorized by: Elizabet Tobin MD    Consent Done?:  Yes (Verbal)  Indications:  Pain  Timeout: Prior to procedure the correct patient, procedure, and site was verified      Location:  Elbow  Prep: Patient was prepped and draped in usual sterile fashion    Needle size:  25 G  Medications:  40 mg methylPREDNISolone acetate 40 mg/mL  Patient tolerance:  Patient tolerated the procedure well with no immediate complications

## 2023-09-27 NOTE — PROGRESS NOTES
[facility]  History & Physical  Orthopedics    SUBJECTIVE:      COVID-19 attestation:  This patient was treated during the COVID-19 pandemic.  This was discussed with the patient, they are aware of our current policies and procedures, were given the option of delaying their visit and or switching to a virtual visit, delaying their surgery when applicable, and they elect to proceed.    Chief Complaint:   Chief Complaint   Patient presents with    Right Elbow - Pain       Referring Provider: Self, Aaareferral     History of Present Illness:  Patient is a 38 y.o. right hand dominant male who presents today with complaints of right elbow pain for the past 5 weeks.  He states he 1st noticed the pain when he was Snapper fishing and had to quickly real in the fish.  Since then he is continued to have pain especially with gripping and daily activities.  He denies numbness and tingling.  He denies weakness.    The patient works in [employer] for [employer].    The patient rates their pain as a 5/10.    Attempted treatment(s) and/or interventions include rest and activity modification.     The patient denies any fevers, chills, N/V, D/C and presents for evaluation.       Past Medical History:   Diagnosis Date    Allergic conjunctivitis     Asthma     Hyperlipidemia     Hypertension      Past Surgical History:   Procedure Laterality Date    HERNIA REPAIR       Review of patient's allergies indicates:   Allergen Reactions    Iodine and iodide containing products Anaphylaxis     Throat closing & itching.     Social History     Social History Narrative    Not on file     Family History   Problem Relation Age of Onset    Diabetes Mother     Cancer Maternal Grandmother     Cancer Paternal Grandmother     No Known Problems Father     No Known Problems Sister     Skin cancer Neg Hx     Melanoma Neg Hx     Allergic rhinitis Neg Hx     Asthma Neg Hx     Immunodeficiency Neg Hx     Rhinitis Neg Hx     Urticaria Neg Hx      Eczema Neg Hx     Atopy Neg Hx     Angioedema Neg Hx     Allergies Neg Hx          Current Outpatient Medications:     albuterol (PROVENTIL/VENTOLIN HFA) 90 mcg/actuation inhaler, Inhale 2 puffs into the lungs every 6 (six) hours as needed for Wheezing or Shortness of Breath. Rescue, Disp: 54 g, Rfl: 3    albuterol-ipratropium (DUO-NEB) 2.5 mg-0.5 mg/3 mL nebulizer solution, Take 3 mLs by nebulization every 6 (six) hours as needed for Wheezing. Rescue, Disp: 75 mL, Rfl: 5    amLODIPine (NORVASC) 5 MG tablet, TAKE 1 TABLET(5 MG) BY MOUTH EVERY DAY, Disp: 90 tablet, Rfl: 3    diazePAM (VALIUM) 5 MG tablet, Take 2 pills 30 min prior to procedure, Disp: 2 tablet, Rfl: 0    fluticasone furoate-vilanteroL (BREO) 100-25 mcg/dose diskus inhaler, INHALE 1 PUFF INTO THE LUNGS EVERY DAY, Disp: 180 each, Rfl: 3    fluticasone propionate (FLONASE) 50 mcg/actuation nasal spray, SHAKE LIQUID AND USE 2 SPRAYS(100 MCG) IN EACH NOSTRIL TWICE DAILY, Disp: 48 g, Rfl: 3    lisinopriL (PRINIVIL,ZESTRIL) 20 MG tablet, TAKE 1 TABLET(20 MG) BY MOUTH EVERY DAY, Disp: 90 tablet, Rfl: 3    simvastatin (ZOCOR) 40 MG tablet, Take 1 tablet (40 mg total) by mouth every evening., Disp: 90 tablet, Rfl: 3    ROS    Review of Systems:  Constitutional: no fever or chills  Eyes: no visual changes  ENT: no nasal congestion or sore throat  Respiratory: no cough or shortness of breath  Cardiovascular: no chest pain  Gastrointestinal: no nausea or vomiting, tolerating diet  Musculoskeletal: pain and soreness    OBJECTIVE:      Vital Signs (Most Recent):  There were no vitals filed for this visit.  There is no height or weight on file to calculate BMI.    Physical Exam    Physical Exam:  Constitutional: The patient appears well-developed and well-nourished. No distress.   Head: Normocephalic and atraumatic.   Nose: Nose normal.   Eyes: Conjunctivae and EOM are normal.   Neck: No tracheal deviation present.   Cardiovascular: Normal rate and intact distal  pulses.    Pulmonary/Chest: Effort normal. No respiratory distress.   Abdominal: There is no guarding.   Lymphatic: Negative for adenopathy   Neurological: The patient is alert.   Psychiatric: The patient has a normal mood and affect.     Bilateral Hand/Wrist Examination:    Observation/Inspection:  Swelling  positive right lateral epicondyle    Deformity  none  Discoloration  none     Scars   none    Atrophy  none    HAND/WRIST EXAMINATION:    Tender to palpation right lateral epicondyle, pain with resisted wrist extension  Bilateral ROM hand/wrist/elbow full    Neurovascular Exam:  Digits WWP, brisk CR < 3s throughout  NVI motor/LTS to M/R/U nerves, radial pulse 2+  2+ biceps and brachioradialis reflexes    Diagnostic Results:    X-rays AP, lateral and oblique right elbow taken today are independently reviewed by me and show no bony or ligamentous abnormalities.     ASSESSMENT/PLAN:      38 y.o. yo male with   Encounter Diagnoses   Name Primary?    Right lateral epicondylitis Yes    Right elbow pain       Plan:  I have referred him to therapy for ASTYM, extensor tendon glides and multi-modality therapy. I have instructed him on activity modification for lateral epicondylitis.  I will see him back in 6 weeks, sooner for any worsening of symptoms.      -I have offered him a selective injection. I have explained the risks, benefits, and alternatives of the procedure in detail.  The patient voices understanding and all questions have been answered. The patient agrees to proceed as planned. So after a sterile prep of the skin in the normal fashion the right lateral epicondylitis was injected using a 25 gauge needle with a combination of 1cc 1% plain lidocaine and 40 mg of methylprednisolone.  The patient is cautioned and immediate relief of pain is secondary to the local anesthetic and will be temporary.  After the anesthetic wears off there may be a increase in pain that may last for a few hours or a few days and they  should use ice to help alleviate this flair up of pain. Patient tolerated the procedure well.    - F/U as needed for any worsening of symptoms  - Call with any questions/concerns in the interim       The patient's pathophysiology was explained in detail with reference to x-rays, models, other visual aids as appropriate.  Treatment options were discussed in detail.  Questions were invited and answered to the patient's satisfaction. I reviewed Primary care , and other specialty's notes to better coordinate patient's care.          Elizabet Tobin MD    Please be aware that this note has been generated with the assistance of MModal voice-to-text.  Please excuse any spelling or grammatical errors.

## 2023-10-12 NOTE — PROGRESS NOTES
[facility] Occupational Therapy  Elbow  Evaluation     Date: 10/13/2023  Name: Roman Quinn  Clinic Number: [id number]    Therapy Diagnosis:   Encounter Diagnoses   Name Primary?    Weakness     Right elbow pain      Physician: Elizabet Tobin MD    Physician Orders: Evaluate and treat ; 2x/wk x 6 wks , Modalities prn  Medical Diagnosis: M77.11 (ICD-10-CM) - Right lateral epicondylitis  Evaluation Date: 10/13/2023  Insurance Authorization Expiration date : 9/27/2023 - 12/31/2023  Plan of Care Certification Period: 1/4/2024  Date of Return to MD: CHINEDU    Visit # / Visits authorized: 1 / 1  Time In:3:00 pm  Time Out: 3:45 pm  Total Billable Time: 45 minutes    Precautions:  Standard and Weightbearing  Subjective       Involved Side: Right elbow   Dominant Side: Right    Date of Onset: ~5 weeks     History of Current Condition/Mechanism of Injury: Pt states he first started to notice the pain while he was fishing for Snapper and had to quickly real in the fish.  Since then he is continued to have pain especially with gripping and daily activities.       Imaging: X-Ray on 9/26/2023 Bony structures of the elbow are intact no joint effusion is seen.  Tiny bony spurs seen at the olecranon without soft tissue swelling      Surgical Procedure and Date: none        Past Medical History/Physical Systems Review:   oRman Quinn  has a past medical history of Allergic conjunctivitis, Asthma, Hyperlipidemia, and Hypertension.    Roman Quinn  has a past surgical history that includes Hernia repair.    Roman has a current medication list which includes the following prescription(s): albuterol, albuterol-ipratropium, amlodipine, diazepam, fluticasone furoate-vilanterol, fluticasone propionate, lisinopril, and simvastatin.    Review of patient's allergies indicates:   Allergen Reactions    Iodine and iodide containing products Anaphylaxis     Throat closing & itching.            Pain:  Functional Pain  Scale Rating 0-10:   n/a/10 on current  3/10 at best  3/10 at worst with picking anything more than 5 pounds   Location: right elbow    Patient's Goals for Therapy:  to increase motion, reduce pain,     Occupation:  [employer] for [employer]  Working presently: employed  Duties: typing     Functional Limitations/Social History:    Previous functional status includes: Independent with all ADLs.     Current FunctionalStatus   Home/Living environment : lives with their family      Limitation of Functional Status as follows:   ADLs/IADLs:     - patient was full functional prior to this incident .     Leisure: Fishing and going to his kids soccer games  Objective     Pt received injection on 9-27-23      Wrist and elbow ROM: Right- full motion     Special Tests:     Right   10/13/2023   Tinel's Test at elbow -   Extrinsic Tightness Test +   Cozen's +   Mills Test  -     Strength: (JORGE Dynamometer in lbs.) Average 3 trials, Position II:     10/13/2023 10/13/2023   Rung 2 Right  Left   JORGE # 2 30# 100#     Comments:  strength taken with elbow extended      Intake Outcome Measure for FOTO elbow  Survey    Therapist reviewed FOTO scores for Roman Quinn on 10/13/2023.   FOTO documents entered into [employer] - see Media section.    Intake Score: 63         Treatment     Treatment Time In/out  (separate from total time) : 23 minutes at the end of the hour     Patient receives ultrasound  for pain control and decreased inflammation @ 100 duty cycle, 3.3 Mhz, applied to right lateral epicondyle, intensity = 1.0 w/cm2 for 8 minutes.    CHT performed  Instrument Assisted Soft Tissue Mobilization  stimulating tissue turnover, scar tissue resorption, and the regeneration of tendons, cross friction massage of extensor wad  muscles and throughout musculature  x 8  minutes      Patient received moist heat x 5 minutes to right elbow  to increase tissue elasticity in preparation for treatment.       Home Exercise  Program/Education:  Issued HEP (see patient instructions in EMR) and educated on modality use for pain management . Exercises were reviewed and Roman was able to demonstrate them prior to the end of the session.   Pt received a written copy of exercises to perform at home. Roman demonstrated good  understanding of the education provided.  Pt was advised to perform these exercises free of pain, and to stop performing them if pain occurs.    Patient/Family Education: role of OT, goals for OT, double booking , scheduling/cancellations - pt verbalized understanding. Discussed insurance limitations with patient.    Additional Education provided: role of CHT/OT, goals for CHT/OT, discussed insurance limitation with patient- patient verbalized understanding       Assessment     This 38 y.o. male referred to Outpatient Occupational Therapy with diagnosis of   Encounter Diagnoses   Name Primary?    Weakness     Right elbow pain     presents with limitations as described in problem list. Patient can benefit from Occupational Therapy services for Iontophoresis, Ultrasound, moist heat, AAROM, AROM, Theraputic exercises, joint mobs, home exercise program provied with written instructions, Ice massage, strengthening, Theraband Ex, and UBE and Orthosis, if deemed necessary . The following goals were discussed with the patient and he is in agreement with them as to be addressed in the treatment plan.     Problem List:   Decreased function of Right UE, Decreased ROM, Increased pain, and Decreased strength      Anticipated barriers to occupational therapy: None  Pt has no cultural, educational or language barriers to learning provided.  Medical Necessity is demonstrated by the following  Occupational Profile/History  Co-morbidities and personal factors that may impact the plan of care [x] LOW: Brief chart review  [] MODERATE: Expanded chart review   [] HIGH: Extensive chart review    Moderate / High Support Documentation:       Examination  Performance deficits relating to physical, cognitive or psychosocial skills that result in activity limitations and/or participation restrictions  [x] LOW: addressing 1-3 Performance deficits  [] MODERATE: 3-5 Performance deficits  [] HIGH: 5+ Performance deficits (please support below)    Moderate / High Support Documentation:    Physical:  Muscle Power/Strength  Muscle Endurance   Strength  Pain    Cognitive:  No Deficits    Psychosocial:    No Deficits     Treatment Options [x] LOW: Limited options  [] MODERATE: Several options  [] HIGH: Multiple options      Decision Making/ Complexity Score: low         The following goals were discussed with the patient and patient is in agreement with them as to be addressed in the treatment plan.     Goals:       Goals to be met in 6-8  weeks:    1) Patient to be IND with HEP and modalities for pain managment.  2)Pt will increase  strength 10-20 lbs. to improve functional grasp for ADLs/work/leisure activities.   3) Pt will decrease FOTO limitation score by 90 to increase their elbow  functional ability.  4) Pt will report use of elbow strap and wrist immobilizer brace to rest tendonitis and increase functional arm use.        Plan         Certification Period/Plan of care expiration: 10/13/2023 to 12/13/2023.    Outpatient Occupational Therapy 2 times weekly for 6 weeks to include the following interventions: Paraffin, Fluidotherapy, Manual therapy/joint mobilizations, Modalities for pain management, US 3 mhz, Therapeutic exercises/activities., Iontophoresis with 2.0 cc Dexamethasone, Strengthening, Orthotic Fabrication/Fit/Training, Edema Control, Scar Management, Wound Care, Electrical Modalities, Joint Protection, and Energy Conservation.      Gracia Gabriel, OT  Occupational therapist, Certified Hand Therapist

## 2023-10-12 NOTE — PATIENT INSTRUCTIONS
Patient Education  ? Perform day-to-day tasks with your palms up (e.g. lifting and carrying weighted objects, reaching for  clothes in the washer/dryer, making beds, etc.)  ? Keep your arms close to your body with all lifting.  ? Avoid picking up objects with your palms down or arms away from your body.  ? Avoid locking your elbows straight to hold heavy objects.  ? Avoid dragging objects (e.g. luggage) or grasping and carrying luggage, briefcases, or book bags.  ? Avoid pulling weeds or pinching and pulling items apart.  ? Avoid lifting weights or using exercise equipment with your elbows straight and arms away from your body.  ? It is ideal to either establish or continue a cardio, core, and lower body workout regimen (as long as this  activity does not increase your elbow pain).    Try to lift object with palm facing up, rather than palm down                              Carpal tunnel brace ( picture above)

## 2023-10-13 ENCOUNTER — CLINICAL SUPPORT (OUTPATIENT)
Dept: REHABILITATION | Facility: HOSPITAL | Age: 38
End: 2023-10-13
Attending: FAMILY MEDICINE
Payer: COMMERCIAL

## 2023-10-13 DIAGNOSIS — R53.1 WEAKNESS: ICD-10-CM

## 2023-10-13 DIAGNOSIS — M25.521 RIGHT ELBOW PAIN: ICD-10-CM

## 2023-10-13 PROCEDURE — 97165 OT EVAL LOW COMPLEX 30 MIN: CPT | Mod: PO

## 2023-10-13 PROCEDURE — 97035 APP MDLTY 1+ULTRASOUND EA 15: CPT | Mod: PO

## 2023-10-24 ENCOUNTER — CLINICAL SUPPORT (OUTPATIENT)
Dept: REHABILITATION | Facility: HOSPITAL | Age: 38
End: 2023-10-24
Payer: COMMERCIAL

## 2023-10-24 DIAGNOSIS — M25.521 RIGHT ELBOW PAIN: ICD-10-CM

## 2023-10-24 DIAGNOSIS — R53.1 WEAKNESS: Primary | ICD-10-CM

## 2023-10-24 PROCEDURE — 97035 APP MDLTY 1+ULTRASOUND EA 15: CPT | Mod: PO

## 2023-10-24 PROCEDURE — 97140 MANUAL THERAPY 1/> REGIONS: CPT | Mod: PO

## 2023-10-24 NOTE — PROGRESS NOTES
"                          [facility]                    Occupational Therapy Daily Treatment Note       Date: 10/24/2023  Name: Roman Quinn  Clinic Number: [id number]    Therapy Diagnosis:   Encounter Diagnoses   Name Primary?    Weakness Yes    Right elbow pain      Physician: Elizabet Tobin MD    Physician Orders: ***  Medical Diagnosis: ***  Surgical Procedure and Date: ***, ***  Insurance Authorization Period Expiration: ***  Plan of Care Certification Period: ***  Date of Return to MD: ***    Visit # / Visits authorized: *** / ***  Time In:***  Time Out: ***  Total Billable Time: *** minutes    Precautions:  {IP WOUND PRECAUTIONS O[facility]:94529}        Subjective     Pt reports: ***    he {Actions; was/was not:07050} compliant with home exercise program given last session.   Response to previous treatment:***      Pain: {0-10:08618::"0"}/10  Location: {RIGHT/LEFT/BILATERAL:72325} {LOCATION ON BODY:71739}     Objective    received the following supervised modalities after being cleared for contradictions for *** minutes:     -  Roman received paraffin bath to *** hand(s) for *** minutes to increase blood flow, circulation, pain management and for tissue elasticity prior to therex.       Patient received fluidotherapy to *** hand(s) for *** minutes to increase blood flow, circulation, desensitization, sensory re-education and for pain management. Performed the following exercises x 2 minutes each:    -***    Roman   received the following direct contact modalities after being cleared for contraindications for *** minutes:      -Patient receives ultrasound  for pain control and remold scar tissue to increase tissue elasticity @ 20 duty cycle, 3.3 Mhz, applied to ***, intensity = 0.6 w/cm2 for 8 minutes.    -***         Roman   received the following manual therapy techniques to increase joint mobilization and soft tissue mobilization for *** minutes:       -Performed scar massage to *** area for *** " "minutes with *** to decrease dense adhesions and improve tensile glide.      -Performed manual therapy techniques to *** area including joint mobilizations, grade *** for *** minutes to increase joint mobility, ROM and for pain management.     -***      Roman  participated in dynamic functional therapeutic exercises to improve functional performance while increasing strength, endurance, ROM,  and flexibility  for ***  minutes, including:    -***      Roman participated in dynamic functional therapeutic activities to improve  fine dexterity and gross motor  for manipulation task x  ***  minutes, including:  ***       Roman  participated in neuromuscular re-education activities to improve: {AMB PT PROGRESS NEURO RE-ED:17651} for *** minutes. The following activities were included:      -***      Home Exercises and Education Provided     Education provided:  - discussed insurance limitation  - Progress towards goals  - Pt also instructed on importance of attention to postural alignment during rest and activity to decrease compensatory movement patterns.       Written Home Exercises Provided: {Blank single:02309::"yes","Patient instructed to cont prior HEP"}.  Exercises were reviewed and Roman was able to demonstrate them prior to the end of the session.  Roman demonstrated {Desc; good/fair/poor:64298} understanding of the HEP provided.   .   See EMR under {Blank single:21583::"Media","Patient Instructions"} for exercises provided {Blank single:11207::"10/24/2023","prior visit"}.       Assessment     Pt would continue to benefit from skilled OT. ***     Roman {IS / IS NOT:28877} progressing well towards his goals and there are no updates to goals at this time. Pt prognosis is {REHAB PROGNOSIS O[facility]:19178}.       The patient demonstrated proper understanding  of each exercise.Pt required verbal and tactile cues for ***.  Pt continues to be limited in functional and leisurely pursuits. Pain limits patients participation in " ADL's. Pt is not able to carryout necessary vocational tasks. Patient continues to requires cues and skilled supervision to complete HEP       Anticipated barriers to occupational therapy: ***    Pt's spiritual, cultural and educational needs considered and pt agreeable to plan of care and goals.    Goals:    ***         Plan      Certification period: *** to ***  Updates/Grading for next session: {AMB OT HAND [place]:42749}      Gracia Gabriel, MOT, OTR/L, CHT

## 2023-10-24 NOTE — PROGRESS NOTES
Occupational Therapy Daily Treatment Note     Name: Roman Quinn  Clinic Number: [id number]    Therapy Diagnosis:   Encounter Diagnoses   Name Primary?    Weakness Yes    Right elbow pain      Physician: Elizabet Tobin MD      Physician Orders: Evaluate and treat ; 2x/wk x 6 wks , Modalities prn  Medical Diagnosis: M77.11 (ICD-10-CM) - Right lateral epicondylitis  Evaluation Date: 10/13/2023  Insurance Authorization Expiration date : 9/27/2023 - 12/31/2023  Plan of Care Certification Period: 1/4/2024  Date of Return to MD: TBD    Visit # / Visits authorized: 1 / 1  Time In: 4:45 pm  Time Out: 5:40 pm  Total Billable Time: 55 minutes    Precautions: Standard    Subjective     Pt reports: he was compliant with home exercise program given last session.   Response to previous treatment: less pain, increase strength       Pain: 3/10  Location: right lateral elbow     Objective     Roman received the following supervised modalities after being cleared for contradictions for 10 minutes:   -  Patient received moist heat  to right  hand  for 10 minutes to increase blood flow  and pain management and for tissue elasticity prior to therex.     Roman received the following direct contact modalities after being cleared for contraindications for 8 minutes:  -Patient receives ultrasound  for pain control and remold scar tissue to increase tissue elasticity @ 20 duty cycle, 3.3 Mhz, applied to right lateral elbow , intensity = 0.6 w/cm2 for 8 minutes.    Roman received the following manual therapy techniques for 16 minutes:     CHT performed  Instrument Assisted Soft Tissue Mobilization  stimulating tissue turnover, scar tissue resorption, and the regeneration of tendons, cross friction massage of extensor wad  muscles and throughout musculature  x 8  minutes       myofascial cupping X 5 minutes for loosening soft tissue,  improve scar mobility, release  tissue restrictions, decrease muscle tension  and  pain, improve blood flow and increase function of  tissues in lateral right extensor  muscle     Marco participated in dynamic functional therapeutic exercises: to improve functional performance for minutes, including: x10 minutes     - added new HEP  Home Exercises and Education Provided       Written Home Exercises Provided: Added HEP .  Exercises were reviewed and Roman was able to demonstrate them prior to the end of the session.  Roman demonstrated good  understanding of the education provided.     Pt received a written copy of exercises to perform at home.   See EMR under patient instructions for exercises given.     Roman demonstrated good  understanding of the education provided.       Assessment     Pt has pain only with lifting items like pot of water,  heavy groceries, heavy door knobs. Not using right arm to allow to heal. Has not purchased wrist brace yet. Has increased  strength by 10 pounds, this was taken upon arrival.    Roman is progressing well towards his goals and there are no updates to goals at this time. Pt prognosis is Good.    Pain limits patients participation in ADL's. Pt is not able to carryout necessary vocational tasks.    Anticipated barriers to occupational therapy: none    Pt's spiritual, cultural and educational needs considered and pt agreeable to plan of care and goals.    Goals:         Goals to be met in 6-8  weeks:     1) Patient to be IND with HEP and modalities for pain managment.  2)Pt will increase  strength 10-20 lbs. to improve functional grasp for ADLs/work/leisure activities.   3) Pt will decrease FOTO limitation score by 90 to increase their elbow  functional ability.  4) Pt will report use of elbow strap and wrist immobilizer brace to rest tendonitis and increase functional arm use.           Plan         Certification Period/Plan of care expiration: 10/13/2023 to 12/13/2023.     Outpatient Occupational Therapy 2 times weekly for 6 weeks to include the  following interventions: Paraffin, Fluidotherapy, Manual therapy/joint mobilizations, Modalities for pain management, US 3 mhz, Therapeutic exercises/activities., Iontophoresis with 2.0 cc Dexamethasone      BHARATH Rodas , CHT   Occupational therapy, Certified Hand Therapist

## 2023-10-24 NOTE — PATIENT INSTRUCTIONS
Arm on table, elbow straight, palm down. Use other hand to lift affected hand, bending wrist up. Slowly lower hand for 3-5 seconds.  __10_ reps per set, __1_ sets per day, __3_ days per week. Add __1-2_ lbs . All pain-free.          Forearm resting on thigh (or other surface), palm up, weight in hand. Raise hand up. Hold momentarily. Return slowly.__10_ reps per set, __1_ sets per day, __3_ days per week. Add __1-2_ lbs . All pain-free.               Patient Education  ? Perform day-to-day tasks with your palms up (e.g. lifting and carrying weighted objects, reaching for  clothes in the washer/dryer, making beds, etc.)  ? Keep your arms close to your body with all lifting.  ? Avoid picking up objects with your palms down or arms away from your body.  ? Avoid locking your elbows straight to hold heavy objects.  ? Avoid dragging objects (e.g. luggage) or grasping and carrying luggage, briefcases, or book bags.  ? Avoid pulling weeds or pinching and pulling items apart.  ? Avoid lifting weights or using exercise equipment with your elbows straight and arms away from your body.  ? It is ideal to either establish or continue a cardio, core, and lower body workout regimen (as long as this  activity does not increase your elbow pain).    Try to lift object with palm facing up, rather than palm down

## 2023-11-02 NOTE — PROGRESS NOTES
Occupational Therapy Daily Treatment Note     Name: Roman Quinn  Clinic Number: [id number]    Therapy Diagnosis:   Encounter Diagnoses   Name Primary?    Weakness Yes    Right elbow pain        Physician: Elizabet Tobin MD      Physician Orders: Evaluate and treat ; 2x/wk x 6 wks ,  Medical Diagnosis: M77.11 (ICD-10-CM) - Right lateral epicondylitis  Evaluation Date: 10/13/2023  Insurance Authorization Expiration date : 9/27/2023 - 12/31/2023  Plan of Care Certification Period: 1/4/2024  Date of Return to MD: TBD    Visit # / Visits authorized: 3 / 20  Time In: 9:00 am  Time Out: 9:45 am  Total Billable Time: 45 minutes    Precautions: Standard    Subjective     Pt reports: he was soreness after last session and feels that his elbow is healing.  Able to do more at home.    Response to previous treatment: less pain, increase strength       Pain: 3/10  Location: right lateral elbow     Objective     Roman received the following supervised modalities after being cleared for contradictions for 8 minutes:     -  Patient received moist heat  to right  elbow  for 8 minutes to increase blood flow  and pain management and for tissue elasticity prior to therex.     Roman received the following direct contact modalities after being cleared for contraindications for 8 minutes:    -Patient receives ultrasound  for pain control and remold scar tissue to increase tissue elasticity @ 20 duty cycle, 3.3 Mhz, applied to right lateral elbow , intensity = 0.6 w/cm2 for 8 minutes.    Roman received the following manual therapy techniques for 15 minutes:     CHT performed  Instrument Assisted Soft Tissue Mobilization  stimulating tissue turnover, scar tissue resorption, and the regeneration of tendons, cross friction massage of extensor wad  muscles and throughout musculature  x 8  minutes       myofascial cupping X 6 minutes for loosening soft tissue,  improve scar mobility, release  tissue restrictions,  "decrease muscle tension  and pain, improve blood flow and increase function of  tissues in lateral right extensor  muscle     Marco participated in dynamic functional therapeutic exercises: to improve functional performance for minutes, including: x 12 minutes     - Wrist stretches 3 ways 3 x 30 seconds each  - AROM wrist flexion/extension on wedge 10 x each  - Applied Ktape to wrist extensors      Strength: (JORGE Dynamometer in lbs.) Average 3 trials, Position II:       10/13/2023 10/13/2023 11/3/2023   Rung 2 Right  Left Right    JORGE # 2 30# 100# 76#     Home Exercises and Education Provided       Written Home Exercises Provided: Added HEP .  Exercises were reviewed and Roman was able to demonstrate them prior to the end of the session.  Roman demonstrated good  understanding of the education provided.     Pt received a written copy of exercises to perform at home.   See EMR under patient instructions for exercises given.     Roman demonstrated good  understanding of the education provided.       Assessment     Pt has pain only with lifting items like pot of water,  heavy groceries, heavy door knobs. Pt reports "he was more sore after last session" and he "feels that his elbow is getting better". Measured girp strength with an increase to to 76 pounds. Pt tolerated application of K-tape well to help facilitate wrist extension. Pt pain with end PROM wrist flexion.    Roman is progressing well towards his goals and there are no updates to goals at this time. Pt prognosis is Good.    Pain limits patients participation in ADL's. Pt is not able to carryout necessary vocational tasks.    Anticipated barriers to occupational therapy: none    Pt's spiritual, cultural and educational needs considered and pt agreeable to plan of care and goals.    Goals:         Goals to be met in 6-8  weeks:     1) Patient to be IND with HEP and modalities for pain managment. -met 11/3  2)Pt will increase  strength 10-20 lbs. to " improve functional grasp for ADLs/work/leisure activities. -met 11/3  3) Pt will decrease FOTO limitation score by 90 to increase their elbow  functional ability.  4) Pt will report use of elbow strap and wrist immobilizer brace to rest tendonitis and increase functional arm use.           Plan         Certification Period/Plan of care expiration: 10/13/2023 to 12/13/2023.     Outpatient Occupational Therapy 2 times weekly for 6 weeks to include the following interventions: Paraffin, Fluidotherapy, Manual therapy/joint mobilizations, Modalities for pain management, US 3 mhz, Therapeutic exercises/activities., Iontophoresis with 2.0 cc Dexamethasone      Gracia Gabriel, OT , CHT   Occupational therapy, Certified Hand Therapist

## 2023-11-03 ENCOUNTER — CLINICAL SUPPORT (OUTPATIENT)
Dept: REHABILITATION | Facility: HOSPITAL | Age: 38
End: 2023-11-03
Payer: COMMERCIAL

## 2023-11-03 DIAGNOSIS — M25.521 RIGHT ELBOW PAIN: ICD-10-CM

## 2023-11-03 DIAGNOSIS — R53.1 WEAKNESS: Primary | ICD-10-CM

## 2023-11-03 PROCEDURE — 97140 MANUAL THERAPY 1/> REGIONS: CPT | Mod: PO

## 2023-11-03 PROCEDURE — 97035 APP MDLTY 1+ULTRASOUND EA 15: CPT | Mod: PO

## 2023-11-03 PROCEDURE — 97110 THERAPEUTIC EXERCISES: CPT | Mod: PO

## 2023-11-09 NOTE — PROGRESS NOTES
Occupational Therapy Daily Treatment Note     Name: Roman Quinn  Clinic Number: [id number]    Therapy Diagnosis:   Encounter Diagnoses   Name Primary?    Weakness Yes    Right elbow pain          Physician: Elizabet Tobin MD      Physician Orders: Evaluate and treat ; 2x/wk x 6 wks ,  Medical Diagnosis: M77.11 (ICD-10-CM) - Right lateral epicondylitis  Evaluation Date: 10/13/2023  Insurance Authorization Expiration date : 9/27/2023 - 12/31/2023  Plan of Care Certification Period: 1/4/2024  Date of Return to MD: TBD    Visit # / Visits authorized: 4 / 20  Time In: 7:30 am  Time Out: 8:15 am  Total Billable Time: 45 minutes    Precautions: Standard    Subjective     Pt reports: he feels like its still sore, but that its getting better.     Response to previous treatment: less pain, increase  strength       Pain: 3/10  Location: right lateral elbow     Objective     Roman received the following supervised modalities after being cleared for contradictions for 8 minutes:     -  Patient received moist heat  to right  elbow  for 8 minutes to increase blood flow  and pain management and for tissue elasticity prior to therex.     Roman received the following direct contact modalities after being cleared for contraindications for 8 minutes:    -Patient receives ultrasound  for pain control and remold scar tissue to increase tissue elasticity @ 20 duty cycle, 3.3 Mhz, applied to right lateral elbow , intensity = 0.6 w/cm2 for 8 minutes.    Roman received the following manual therapy techniques for 16 minutes:     CHT performed  Instrument Assisted Soft Tissue Mobilization  stimulating tissue turnover, scar tissue resorption, and the regeneration of tendons, cross friction massage of extensor wad  muscles and throughout musculature  x 8  minutes       myofascial cupping X 8 minutes for loosening soft tissue,  improve scar mobility, release  tissue restrictions, decrease muscle tension  and pain,  improve blood flow and increase function of  tissues in lateral right extensor  muscle     Marco participated in dynamic functional therapeutic exercises: to improve functional performance for minutes, including: x 13 minutes     - Wrist stretches 3 ways 3 x 30 seconds each  - AROM wrist flexion/extension on wedge 10 x each  - Octi 3 min  - Static hold in neutral with 5 pound weight 2 min  - Static hold sup/pro with 5 pound weight 1 min  - Measurements taken      Strength: (JORGE Dynamometer in lbs.) Average 3 trials, Position II:       10/13/2023 10/13/2023 11/3/2023 11/10/2023   Rung 2 Right  Left Right  Right    JORGE # 2 30# 100# 76# 55#  83# post treatment     Home Exercises and Education Provided       Written Home Exercises Provided: Added HEP .  Exercises were reviewed and Roman was able to demonstrate them prior to the end of the session.  Roman demonstrated good  understanding of the education provided.     Pt received a written copy of exercises to perform at home.   See EMR under patient instructions for exercises given.     Roman demonstrated good  understanding of the education provided.       Assessment     Pt has pain only with lifting items like pot of water,  heavy groceries, heavy door knobs. Pt reports he did not think K-Tape worked for him. Pt tolerated static holds of 5 pound weight well. Post treatment  strength increased to 83 pounds. Will progress as tolerated and continue to work on endurance.     Roman is progressing well towards his goals and there are no updates to goals at this time. Pt prognosis is Good.       Anticipated barriers to occupational therapy: none    Pt's spiritual, cultural and educational needs considered and pt agreeable to plan of care and goals.    Goals:         Goals to be met in 6-8  weeks:     1) Patient to be IND with HEP and modalities for pain managment. -met 11/3  2)Pt will increase  strength 10-20 lbs. to improve functional grasp for  ADLs/work/leisure activities. -met 11/3  3) Pt will decrease FOTO limitation score by 90 to increase their elbow  functional ability.  4) Pt will report use of elbow strap and wrist immobilizer brace to rest tendonitis and increase functional arm use.           Plan         Certification Period/Plan of care expiration: 10/13/2023 to 12/13/2023.     Outpatient Occupational Therapy 2 times weekly for 6 weeks to include the following interventions: Paraffin, Fluidotherapy, Manual therapy/joint mobilizations, Modalities for pain management, US 3 mhz, Therapeutic exercises/activities., Iontophoresis with 2.0 cc Dexamethasone      Gracia Gabriel, OT ,

## 2023-11-10 ENCOUNTER — CLINICAL SUPPORT (OUTPATIENT)
Dept: REHABILITATION | Facility: HOSPITAL | Age: 38
End: 2023-11-10
Payer: COMMERCIAL

## 2023-11-10 DIAGNOSIS — M25.521 RIGHT ELBOW PAIN: ICD-10-CM

## 2023-11-10 DIAGNOSIS — R53.1 WEAKNESS: Primary | ICD-10-CM

## 2023-11-10 PROCEDURE — 97140 MANUAL THERAPY 1/> REGIONS: CPT | Mod: PO

## 2023-11-10 PROCEDURE — 97035 APP MDLTY 1+ULTRASOUND EA 15: CPT | Mod: PO

## 2023-11-10 PROCEDURE — 97110 THERAPEUTIC EXERCISES: CPT | Mod: PO

## 2023-11-16 NOTE — PROGRESS NOTES
"                          Occupational Therapy Daily Treatment Note     Name: Roman Quinn  Clinic Number: [id number]    Therapy Diagnosis:   Encounter Diagnoses   Name Primary?    Weakness Yes    Right elbow pain            Physician: Elizabet Tobin MD      Physician Orders: Evaluate and treat ; 2x/wk x 6 wks ,  Medical Diagnosis: M77.11 (ICD-10-CM) - Right lateral epicondylitis  Evaluation Date: 10/13/2023  Insurance Authorization Expiration date : 9/27/2023 - 12/31/2023  Plan of Care Certification Period: 1/4/2024  Date of Return to MD: TBD    Visit # / Visits authorized: 5 / 20  Time In: 7:30 am  Time Out: 8:15 am  Total Billable Time: 45 minutes    Precautions: Standard    Subjective     Pt reports: He felt slight pain in his elbow when he opened a refrigerator door. "It's not 100%"    Response to previous treatment: less pain, increase  strength       Pain: 3/10  Location: right lateral elbow     Objective     Roman received the following supervised modalities after being cleared for contradictions for 5 minutes:     -  Patient received moist heat  to right  elbow  for 5 minutes to increase blood flow  and pain management and for tissue elasticity prior to therex.     Roman received the following direct contact modalities after being cleared for contraindications for 8 minutes:    -Patient receives ultrasound  for pain control and remold scar tissue to increase tissue elasticity @ 20 duty cycle, 3.3 Mhz, applied to right lateral elbow , intensity = 0.6 w/cm2 for 8 minutes.    Roman received the following manual therapy techniques for 29 minutes:     - Radial head joint mob, lateral glide oscillations and sustained hold  - AROM forearm rotation into supination with radial head lateral glide 10 times  -Therapist performed  Instrument Assisted Soft Tissue Mobilization  stimulating tissue turnover, scar tissue resorption, and the regeneration of tendons, cross friction massage of extensor wad  muscles and " throughout musculature  x 8  minutes       -myofascial cupping X 8 minutes for loosening soft tissue,  improve scar mobility, release  tissue restrictions, decrease muscle tension  and pain, improve blood flow and increase function of  tissues in lateral right extensor  muscle     Marco participated in dynamic functional therapeutic exercises: to improve functional performance for minutes, including: x 3 minutes     - Eccentric wrist extension 1# 10 reps  - Static hold in neutral with 5 pound weight 1 min     Strength: (JORGE Dynamometer in lbs.) Average 3 trials, Position II:       10/13/2023 10/13/2023 11/3/2023 11/17/2023   Rung 2 Right  Left Right  Right    JORGE # 2 30# 100# 76#   85# post session     Home Exercises and Education Provided       Written Home Exercises Provided: Added HEP .  Exercises were reviewed and Roman was able to demonstrate them prior to the end of the session.  Roman demonstrated good  understanding of the education provided.     Pt received a written copy of exercises to perform at home.   See EMR under patient instructions for exercises given.     Roman demonstrated good  understanding of the education provided.       Assessment     Pt has pain when opening heavy refrigerator doors. Pt tolerated eccentric wrist extension well. Post treatment  strength increased to 85 pounds. Will progress as tolerated and continue to work on endurance.     Roman is progressing well towards his goals and there are no updates to goals at this time. Pt prognosis is Good.       Anticipated barriers to occupational therapy: none    Pt's spiritual, cultural and educational needs considered and pt agreeable to plan of care and goals.    Goals:         Goals to be met in 6-8  weeks:     1) Patient to be IND with HEP and modalities for pain managment. -met 11/3  2)Pt will increase  strength 10-20 lbs. to improve functional grasp for ADLs/work/leisure activities. -met 11/3  3) Pt will decrease FOTO  limitation score by 90 to increase their elbow  functional ability.  4) Pt will report use of elbow strap and wrist immobilizer brace to rest tendonitis and increase functional arm use.           Plan         Certification Period/Plan of care expiration: 10/13/2023 to 12/13/2023.     Outpatient Occupational Therapy 2 times weekly for 6 weeks to include the following interventions: Paraffin, Fluidotherapy, Manual therapy/joint mobilizations, Modalities for pain management, US 3 mhz, Therapeutic exercises/activities., Iontophoresis with 2.0 cc Dexamethasone      Gracia Gabriel, OT ,

## 2023-11-17 ENCOUNTER — CLINICAL SUPPORT (OUTPATIENT)
Dept: REHABILITATION | Facility: HOSPITAL | Age: 38
End: 2023-11-17
Payer: COMMERCIAL

## 2023-11-17 DIAGNOSIS — M25.521 RIGHT ELBOW PAIN: ICD-10-CM

## 2023-11-17 DIAGNOSIS — R53.1 WEAKNESS: Primary | ICD-10-CM

## 2023-11-17 PROCEDURE — 97035 APP MDLTY 1+ULTRASOUND EA 15: CPT | Mod: PO

## 2023-11-17 PROCEDURE — 97140 MANUAL THERAPY 1/> REGIONS: CPT | Mod: PO

## 2023-11-21 ENCOUNTER — CLINICAL SUPPORT (OUTPATIENT)
Dept: REHABILITATION | Facility: HOSPITAL | Age: 38
End: 2023-11-21
Payer: COMMERCIAL

## 2023-11-21 DIAGNOSIS — R53.1 WEAKNESS: Primary | ICD-10-CM

## 2023-11-21 DIAGNOSIS — M25.521 RIGHT ELBOW PAIN: ICD-10-CM

## 2023-11-21 PROCEDURE — 97033 APP MDLTY 1+IONTPHRSIS EA 15: CPT | Mod: PO

## 2023-11-21 PROCEDURE — 97140 MANUAL THERAPY 1/> REGIONS: CPT | Mod: PO

## 2023-11-21 PROCEDURE — 97035 APP MDLTY 1+ULTRASOUND EA 15: CPT | Mod: PO

## 2023-11-21 NOTE — PROGRESS NOTES
"                          Occupational Therapy Daily Treatment Note     Name: Roman Quinn  Clinic Number: [id number]    Therapy Diagnosis:   Encounter Diagnoses   Name Primary?    Weakness Yes    Right elbow pain              Physician: Elizabet Tobin MD      Physician Orders: Evaluate and treat ; 2x/wk x 6 wks ,  Medical Diagnosis: M77.11 (ICD-10-CM) - Right lateral epicondylitis  Evaluation Date: 10/13/2023  Insurance Authorization Expiration date : 9/27/2023 - 12/31/2023  Plan of Care Certification Period: 1/4/2024  Date of Return to MD: TBD    Visit # / Visits authorized: 6 / 20  Time In: 3:15 pm  Time Out: 4 pm  Total Billable Time: 45 minutes    Precautions: Standard    Subjective     Pt reports: He felt slight pain in his elbow when he opened a refrigerator door. "It's not 100%"    Response to previous treatment: less pain, increase  strength       Pain: 3/10  Location: right lateral elbow     Objective     Roman received the following supervised modalities after being cleared for contradictions for 5 minutes:     -  Patient received moist heat  to right  elbow  for 5 minutes to increase blood flow  and pain management and for tissue elasticity prior to therex.     Roman received the following direct contact modalities after being cleared for contraindications for 8 minutes:    -Patient receives ultrasound  for pain control and remold scar tissue to increase tissue elasticity @ 20 duty cycle, 3.3 Mhz, applied to right lateral elbow , intensity = 0.6 w/cm2 for 8 minutes.    Roman received the following manual therapy techniques for 29 minutes:     - Radial head joint mob, lateral glide oscillations and sustained hold  - AROM forearm rotation into supination with radial head lateral glide 10 times  -Therapist performed  Instrument Assisted Soft Tissue Mobilization  stimulating tissue turnover, scar tissue resorption, and the regeneration of tendons, cross friction massage of extensor wad  muscles and " throughout musculature  x 8  minutes       Roman  received a 14 hour extended wear Iontophoresis patch for pain control and decreased inflammation with 1.0cc Dexamethasone applied to right lateral elbow . Patient educated on wear time and precautions voicing  understanding and compliance.    Marco participated in dynamic functional therapeutic exercises: to improve functional performance for minutes, including: x 3 minutes     - stretches x 15 reps in flexion and extension elbow extended       Strength: (JORGE Dynamometer in lbs.) Average 3 trials, Position II:       10/13/2023 10/13/2023 11/3/2023 11/17/2023   Rung 2 Right  Left Right  Right    JORGE # 2 30# 100# 76#   85# post session     Home Exercises and Education Provided       Written Home Exercises Provided: Added HEP .  Exercises were reviewed and Roman was able to demonstrate them prior to the end of the session.  Roman demonstrated good  understanding of the education provided.     Pt received a written copy of exercises to perform at home.   See EMR under patient instructions for exercises given.     Roman demonstrated good  understanding of the education provided.       Assessment     Pt has pain since he attempted to open a large cabinet.  Pt tolerated eccentric wrist extension well. Post treatment  strength increased to 75 pounds. Will progress as tolerated and continue to work on endurance.     Roman is progressing well towards his goals and there are no updates to goals at this time. Pt prognosis is Good.       Anticipated barriers to occupational therapy: none    Pt's spiritual, cultural and educational needs considered and pt agreeable to plan of care and goals.    Goals:         Goals to be met in 6-8  weeks:     1) Patient to be IND with HEP and modalities for pain managment. -met 11/3  2)Pt will increase  strength 10-20 lbs. to improve functional grasp for ADLs/work/leisure activities. -met 11/3  3) Pt will decrease FOTO limitation  score by 90 to increase their elbow  functional ability.  4) Pt will report use of elbow strap and wrist immobilizer brace to rest tendonitis and increase functional arm use.           Plan         Certification Period/Plan of care expiration: 10/13/2023 to 12/13/2023.     Outpatient Occupational Therapy 2 times weekly for 6 weeks to include the following interventions: Paraffin, Fluidotherapy, Manual therapy/joint mobilizations, Modalities for pain management, US 3 mhz, Therapeutic exercises/activities., Iontophoresis with 2.0 cc Dexamethasone      Gracia Gabriel, OT ,

## 2023-11-28 ENCOUNTER — CLINICAL SUPPORT (OUTPATIENT)
Dept: REHABILITATION | Facility: HOSPITAL | Age: 38
End: 2023-11-28
Payer: COMMERCIAL

## 2023-11-28 DIAGNOSIS — M25.521 RIGHT ELBOW PAIN: ICD-10-CM

## 2023-11-28 DIAGNOSIS — R53.1 WEAKNESS: Primary | ICD-10-CM

## 2023-11-28 PROCEDURE — 97110 THERAPEUTIC EXERCISES: CPT | Mod: PO

## 2023-11-28 PROCEDURE — 97035 APP MDLTY 1+ULTRASOUND EA 15: CPT | Mod: PO

## 2023-11-28 PROCEDURE — 97530 THERAPEUTIC ACTIVITIES: CPT | Mod: PO

## 2023-11-28 NOTE — PROGRESS NOTES
"                          Occupational Therapy Daily Treatment Note     Name: Roman Quinn  Clinic Number: [id number]    Therapy Diagnosis:   Encounter Diagnoses   Name Primary?    Weakness Yes    Right elbow pain              Physician: Elizabet Tobin MD      Physician Orders: Evaluate and treat ; 2x/wk x 6 wks ,  Medical Diagnosis: M77.11 (ICD-10-CM) - Right lateral epicondylitis  Evaluation Date: 10/13/2023  Insurance Authorization Expiration date : 9/27/2023 - 12/31/2023  Plan of Care Certification Period: 1/4/2024  Date of Return to MD: TBD    Visit # / Visits authorized: 6 / 20  Time In: 3:15 pm  Time Out: 4 pm  Total Billable Time: 45 minutes    Precautions: Standard    Subjective     Pt reports: He felt slight pain in his elbow when he opened a refrigerator door. "It's not 100%"    Response to previous treatment: less pain, increase  strength       Pain: 3/10  Location: right lateral elbow     Objective     Roman received the following supervised modalities after being cleared for contradictions for 5 minutes:     -  Patient received moist heat  to right  elbow  for 5 minutes to increase blood flow  and pain management and for tissue elasticity prior to therex.     Roman received the following direct contact modalities after being cleared for contraindications for 8 minutes:    -Patient receives ultrasound  for pain control and remold scar tissue to increase tissue elasticity @ 20 duty cycle, 3.3 Mhz, applied to right lateral elbow , intensity = 0.6 w/cm2 for 8 minutes.    Roman received the following manual therapy techniques for 29 minutes:     - Radial head joint mob, lateral glide oscillations and sustained hold  - AROM forearm rotation into supination with radial head lateral glide 10 times  -Therapist performed  Instrument Assisted Soft Tissue Mobilization  stimulating tissue turnover, scar tissue resorption, and the regeneration of tendons, cross friction massage of extensor wad  muscles and " throughout musculature  x 8  minutes         Marco participated in dynamic functional therapeutic exercises: to improve functional performance for minutes, including: x 3 minutes     - stretches x 15 reps in flexion and extension elbow extended  - wrist PRE using 2#wt x 20 reps     Strength: (JORGE Dynamometer in lbs.) Average 3 trials, Position II:       10/13/2023 10/13/2023 11/3/2023 11/28/2023   Rung 2 Right  Left Right  Right    JORGE # 2 30# 100# 76# 81#; post session is 90#     Home Exercises and Education Provided       Written Home Exercises Provided: Added HEP .  Exercises were reviewed and Roman was able to demonstrate them prior to the end of the session.  Roman demonstrated good  understanding of the education provided.     Pt received a written copy of exercises to perform at home.   See EMR under patient instructions for exercises given.     Roman demonstrated good  understanding of the education provided.       Assessment     Pt has pain since he attempted to open a large cabinet.  Pt tolerated eccentric wrist extension well. Post treatment  strength increased to 75 pounds. Will progress as tolerated and continue to work on endurance.     Roman is progressing well towards his goals and there are no updates to goals at this time. Pt prognosis is Good.       Anticipated barriers to occupational therapy: none    Pt's spiritual, cultural and educational needs considered and pt agreeable to plan of care and goals.         Goals met:     1) Patient to be IND with HEP and modalities for pain managment. -met 11/3  2)Pt will increase  strength 10-20 lbs. to improve functional grasp for ADLs/work/leisure activities. -met 11/3  3) Pt will decrease FOTO limitation score by 90 to increase their elbow  functional ability. MET  4) Pt will report use of elbow strap and wrist immobilizer brace to rest tendonitis and increase functional arm use. MET           Plan         Certification Period/Plan of care  expiration: 10/13/2023 to 12/13/2023.     Pt would like to put his Therapy on hold until he gets reassessed for further elbow from MD.      Gracia Gabriel OT ,

## 2023-11-29 DIAGNOSIS — J30.1 ALLERGIC RHINITIS DUE TO POLLEN, UNSPECIFIED SEASONALITY: ICD-10-CM

## 2023-11-29 RX ORDER — FLUTICASONE PROPIONATE 50 MCG
SPRAY, SUSPENSION (ML) NASAL
Qty: 48 G | Refills: 0 | Status: SHIPPED | OUTPATIENT
Start: 2023-11-29 | End: 2023-12-04 | Stop reason: SDUPTHER

## 2023-11-29 RX ORDER — AMLODIPINE BESYLATE 5 MG/1
5 TABLET ORAL DAILY
Qty: 90 TABLET | Refills: 0 | Status: SHIPPED | OUTPATIENT
Start: 2023-11-29 | End: 2023-12-04 | Stop reason: SDUPTHER

## 2023-11-29 RX ORDER — LISINOPRIL 20 MG/1
20 TABLET ORAL DAILY
Qty: 90 TABLET | Refills: 0 | Status: SHIPPED | OUTPATIENT
Start: 2023-11-29 | End: 2023-12-04 | Stop reason: SDUPTHER

## 2023-11-29 NOTE — TELEPHONE ENCOUNTER
No care due was identified.  Health [facility] Embedded Care Due Messages. Reference number: [id number].   11/29/2023 1:38:14 PM CST

## 2023-11-30 DIAGNOSIS — E78.49 OTHER HYPERLIPIDEMIA: Chronic | ICD-10-CM

## 2023-11-30 RX ORDER — SIMVASTATIN 40 MG/1
40 TABLET, FILM COATED ORAL NIGHTLY
Qty: 90 TABLET | Refills: 0 | Status: SHIPPED | OUTPATIENT
Start: 2023-11-30 | End: 2024-01-17

## 2023-11-30 NOTE — TELEPHONE ENCOUNTER
No care due was identified.  [facility] Embedded Care Due Messages. Reference number: [id number].   11/30/2023 9:39:00 AM CST

## 2023-12-04 DIAGNOSIS — J30.1 ALLERGIC RHINITIS DUE TO POLLEN, UNSPECIFIED SEASONALITY: ICD-10-CM

## 2023-12-04 RX ORDER — LISINOPRIL 20 MG/1
20 TABLET ORAL DAILY
Qty: 90 TABLET | Refills: 0 | Status: SHIPPED | OUTPATIENT
Start: 2023-12-04 | End: 2024-01-18

## 2023-12-04 RX ORDER — AMLODIPINE BESYLATE 5 MG/1
5 TABLET ORAL DAILY
Qty: 90 TABLET | Refills: 0 | Status: SHIPPED | OUTPATIENT
Start: 2023-12-04 | End: 2024-01-18

## 2023-12-04 RX ORDER — FLUTICASONE PROPIONATE 50 MCG
SPRAY, SUSPENSION (ML) NASAL
Qty: 48 G | Refills: 0 | Status: SHIPPED | OUTPATIENT
Start: 2023-12-04 | End: 2023-12-27 | Stop reason: SDUPTHER

## 2023-12-04 NOTE — TELEPHONE ENCOUNTER
No care due was identified.  Health [facility] Embedded Care Due Messages. Reference number: [id number].   12/04/2023 10:13:50 AM CST

## 2023-12-06 ENCOUNTER — OFFICE VISIT (OUTPATIENT)
Dept: ORTHOPEDICS | Facility: CLINIC | Age: 38
End: 2023-12-06
Payer: COMMERCIAL

## 2023-12-06 ENCOUNTER — PATIENT MESSAGE (OUTPATIENT)
Dept: REHABILITATION | Facility: HOSPITAL | Age: 38
End: 2023-12-06
Payer: COMMERCIAL

## 2023-12-06 DIAGNOSIS — G89.29 CHRONIC PAIN OF RIGHT ELBOW: ICD-10-CM

## 2023-12-06 DIAGNOSIS — M25.521 CHRONIC PAIN OF RIGHT ELBOW: ICD-10-CM

## 2023-12-06 DIAGNOSIS — M77.11 RIGHT LATERAL EPICONDYLITIS: Primary | ICD-10-CM

## 2023-12-06 PROCEDURE — 4010F PR ACE/ARB THEARPY RXD/TAKEN: ICD-10-PCS | Mod: CPTII,S$GLB,, | Performed by: ORTHOPAEDIC SURGERY

## 2023-12-06 PROCEDURE — 20605 DRAIN/INJ JOINT/BURSA W/O US: CPT | Mod: RT,S$GLB,, | Performed by: ORTHOPAEDIC SURGERY

## 2023-12-06 PROCEDURE — 1160F PR REVIEW ALL MEDS BY PRESCRIBER/CLIN PHARMACIST DOCUMENTED: ICD-10-PCS | Mod: CPTII,S$GLB,, | Performed by: ORTHOPAEDIC SURGERY

## 2023-12-06 PROCEDURE — 3044F PR MOST RECENT HEMOGLOBIN A1C LEVEL <7.0%: ICD-10-PCS | Mod: CPTII,S$GLB,, | Performed by: ORTHOPAEDIC SURGERY

## 2023-12-06 PROCEDURE — 1160F RVW MEDS BY RX/DR IN RCRD: CPT | Mod: CPTII,S$GLB,, | Performed by: ORTHOPAEDIC SURGERY

## 2023-12-06 PROCEDURE — 99999 PR PBB SHADOW E&M-EST. PATIENT-LVL III: ICD-10-PCS | Mod: PBBFAC,,, | Performed by: ORTHOPAEDIC SURGERY

## 2023-12-06 PROCEDURE — 4010F ACE/ARB THERAPY RXD/TAKEN: CPT | Mod: CPTII,S$GLB,, | Performed by: ORTHOPAEDIC SURGERY

## 2023-12-06 PROCEDURE — 3044F HG A1C LEVEL LT 7.0%: CPT | Mod: CPTII,S$GLB,, | Performed by: ORTHOPAEDIC SURGERY

## 2023-12-06 PROCEDURE — 1159F PR MEDICATION LIST DOCUMENTED IN MEDICAL RECORD: ICD-10-PCS | Mod: CPTII,S$GLB,, | Performed by: ORTHOPAEDIC SURGERY

## 2023-12-06 PROCEDURE — 99214 OFFICE O/P EST MOD 30 MIN: CPT | Mod: 25,S$GLB,, | Performed by: ORTHOPAEDIC SURGERY

## 2023-12-06 PROCEDURE — 1159F MED LIST DOCD IN RCRD: CPT | Mod: CPTII,S$GLB,, | Performed by: ORTHOPAEDIC SURGERY

## 2023-12-06 PROCEDURE — 99999 PR PBB SHADOW E&M-EST. PATIENT-LVL III: CPT | Mod: PBBFAC,,, | Performed by: ORTHOPAEDIC SURGERY

## 2023-12-06 PROCEDURE — 20605 INTERMEDIATE JOINT ASPIRATION/INJECTION: ICD-10-PCS | Mod: RT,S$GLB,, | Performed by: ORTHOPAEDIC SURGERY

## 2023-12-06 PROCEDURE — 99214 PR OFFICE/OUTPT VISIT, EST, LEVL IV, 30-39 MIN: ICD-10-PCS | Mod: 25,S$GLB,, | Performed by: ORTHOPAEDIC SURGERY

## 2023-12-06 RX ADMIN — METHYLPREDNISOLONE ACETATE 40 MG: 40 INJECTION, SUSPENSION INTRA-ARTICULAR; INTRALESIONAL; INTRAMUSCULAR; SOFT TISSUE at 01:12

## 2023-12-06 NOTE — PROGRESS NOTES
Roman Quinn presents for follow up evaluation of   Encounter Diagnosis   Name Primary?    Right lateral epicondylitis Yes   Patient is following up from csi 9.27.23 right lateral epicondyle and OT (AISTM) which gave him some improvement.  He states that the pain has returned along his right lateral elbow.     PE:    AA&O x 4.  NAD  HEENT:  NCAT, sclera nonicteric  Lungs:  Respirations are equal and unlabored.  CV:  2+ bilateral upper and lower extremity pulses.  MSK: Right Elbow: +edema, Tender to palpation right lateral epicondyle, pain with resisted wrist extensio Neurovascularly intact bilaterally.  5/5 thenar and intrinsic musculature strength.  Otherwise full range of motion hands, wrists and elbows.      ASSESSMENT/PLAN:      38 y.o. yo male with   Encounter Diagnosis   Name Primary?    Right lateral epicondylitis Yes        Plan:  We have discussed the natural history of lateral epicondylitis including treatment options such as splinting, oral and topical anti-inflammatories, cortisone injections and surgery.    -I have offered him a selective injection. I have explained the risks, benefits, and alternatives of the procedure in detail.  The patient voices understanding and all questions have been answered. The patient agrees to proceed as planned. So after a sterile prep of the skin in the normal fashion the right lateral epicondyle was injected using a 25 gauge needle with a combination of 1cc 1% plain lidocaine and 40 mg of methylprednisolone.  The patient is cautioned and immediate relief of pain is secondary to the local anesthetic and will be temporary.  After the anesthetic wears off there may be a increase in pain that may last for a few hours or a few days and they should use ice to help alleviate this flair up of pain. Patient tolerated the procedure well.    Follow up after therapy completed  Call with any questions/concerns in the interim        Elizabet Tobin MD     Please be aware that  this note has been generated with the assistance of [employer] voice-to-text.  Please excuse any spelling or grammatical errors.

## 2023-12-17 RX ORDER — METHYLPREDNISOLONE ACETATE 40 MG/ML
40 INJECTION, SUSPENSION INTRA-ARTICULAR; INTRALESIONAL; INTRAMUSCULAR; SOFT TISSUE
Status: DISCONTINUED | OUTPATIENT
Start: 2023-12-06 | End: 2023-12-17 | Stop reason: HOSPADM

## 2023-12-17 NOTE — PROCEDURES
R lateral epicondyleIntermediate Joint Aspiration/Injection    Date/Time: 12/6/2023 1:30 PM    Performed by: Elizabet Tobin MD  Authorized by: Elizabet oTbin MD    Consent Done?:  Yes (Verbal)  Indications:  Pain  Timeout: Prior to procedure the correct patient, procedure, and site was verified      Location:  Elbow  Prep: Patient was prepped and draped in usual sterile fashion    Needle size:  25 G  Medications:  40 mg methylPREDNISolone acetate 40 mg/mL  Patient tolerance:  Patient tolerated the procedure well with no immediate complications

## 2023-12-21 ENCOUNTER — CLINICAL SUPPORT (OUTPATIENT)
Dept: REHABILITATION | Facility: HOSPITAL | Age: 38
End: 2023-12-21
Payer: COMMERCIAL

## 2023-12-21 DIAGNOSIS — M25.521 RIGHT ELBOW PAIN: ICD-10-CM

## 2023-12-21 DIAGNOSIS — R53.1 WEAKNESS: Primary | ICD-10-CM

## 2023-12-21 PROCEDURE — 97110 THERAPEUTIC EXERCISES: CPT | Mod: PO

## 2023-12-21 PROCEDURE — 97140 MANUAL THERAPY 1/> REGIONS: CPT | Mod: PO

## 2023-12-21 NOTE — PROGRESS NOTES
Occupational Therapy Daily Treatment Note     Name: Roman Quinn  Clinic Number: [id number]    Therapy Diagnosis:   Encounter Diagnoses   Name Primary?    Weakness Yes    Right elbow pain              Physician: Elizabet Tobin MD      Physician Orders: Evaluate and treat ; 2x/wk x 6 wks ,  Medical Diagnosis: M77.11 (ICD-10-CM) - Right lateral epicondylitis  Evaluation Date: 10/13/2023  Insurance Authorization Expiration date : 9/27/2023 - 12/31/2023  Plan of Care Certification Period: 1/4/2024  Date of Return to MD: TBD    Visit # / Visits authorized: 6 / 20  Time In: 3:15 pm  Time Out: 4 pm  Total Billable Time: 45 minutes    Precautions: Standard    Subjective     Pt reports: Pt got injection on 12-6-23    Response to previous treatment: less pain, decrease frequency in pain       Pain: 3/10  Location: right lateral elbow     Objective     Roman received the following supervised modalities after being cleared for contradictions for 5 minutes:     -  Patient received moist heat  to right  elbow  for 5 minutes to increase blood flow  and pain management and for tissue elasticity prior to therex.     Roman received the following direct contact modalities after being cleared for contraindications for 8 minutes:    -Patient receives ultrasound  for pain control and remold scar tissue to increase tissue elasticity @ 20 duty cycle, 3.3 Mhz, applied to right lateral elbow , intensity = 0.6 w/cm2 for 8 minutes.    Roman received the following manual therapy techniques for 29 minutes:     - Radial head joint mob, lateral glide oscillations and sustained hold  - AROM forearm rotation into supination with radial head lateral glide 10 times  -Therapist performed  Instrument Assisted Soft Tissue Mobilization  stimulating tissue turnover, scar tissue resorption, and the regeneration of tendons, cross friction massage of extensor wad  muscles and throughout musculature  x 8  minutes         Marco  participated in dynamic functional therapeutic exercises: to improve functional performance for minutes, including: x 3 minutes       Bicep and tricep using 7#wt x 30 reps   - stretches x 15 reps in flexion and extension elbow extended  - wrist PRE using 2#wt x 20 reps     Strength: (JORGE Dynamometer in lbs.) Average 3 trials, Position II:       10/13/2023 10/13/2023 11/3/2023 12/21/2023   Rung 2 Right  Left Right  Right    JORGE # 2 30# 100# 76# 101     Home Exercises and Education Provided       Written Home Exercises Provided: Added HEP .  Exercises were reviewed and Roman was able to demonstrate them prior to the end of the session.  Roman demonstrated good  understanding of the education provided.     Pt received a written copy of exercises to perform at home.   See EMR under patient instructions for exercises given.     Roman demonstrated good  understanding of the education provided.       Assessment     Pt has pain since he attempted to open a large cabinet.  Pt tolerated eccentric wrist extension well. Post treatment  strength increased to 75 pounds. Will progress as tolerated and continue to work on endurance.     Roman is progressing well towards his goals and there are no updates to goals at this time. Pt prognosis is Good.       Anticipated barriers to occupational therapy: none    Pt's spiritual, cultural and educational needs considered and pt agreeable to plan of care and goals.         Goals met:     1) Patient to be IND with HEP and modalities for pain managment. -met 11/3  2)Pt will increase  strength 10-20 lbs. to improve functional grasp for ADLs/work/leisure activities. -met 11/3  3) Pt will decrease FOTO limitation score by 90 to increase their elbow  functional ability. MET  4) Pt will report use of elbow strap and wrist immobilizer brace to rest tendonitis and increase functional arm use. MET           Plan         Certification Period/Plan of care expiration: 10/13/2023 to  12/13/2023.     Pt would like to put his Therapy on hold until he gets reassessed for further elbow from MD.      Gracia Gabriel OT ,

## 2023-12-22 DIAGNOSIS — M25.521 RIGHT ELBOW PAIN: Primary | ICD-10-CM

## 2023-12-22 DIAGNOSIS — J30.1 ALLERGIC RHINITIS DUE TO POLLEN, UNSPECIFIED SEASONALITY: ICD-10-CM

## 2023-12-22 DIAGNOSIS — J45.40 MODERATE PERSISTENT ASTHMA WITHOUT COMPLICATION: Chronic | ICD-10-CM

## 2023-12-22 NOTE — TELEPHONE ENCOUNTER
No care due was identified.  [facility] Embedded Care Due Messages. Reference number: [id number].   12/22/2023 4:08:12 PM CST

## 2023-12-24 RX ORDER — FLUTICASONE PROPIONATE 50 MCG
SPRAY, SUSPENSION (ML) NASAL
Qty: 48 G | Refills: 0 | OUTPATIENT
Start: 2023-12-24

## 2023-12-24 RX ORDER — ALBUTEROL SULFATE 90 UG/1
AEROSOL, METERED RESPIRATORY (INHALATION)
Qty: 54 G | Refills: 0 | Status: SHIPPED | OUTPATIENT
Start: 2023-12-24 | End: 2023-12-27 | Stop reason: SDUPTHER

## 2023-12-24 NOTE — TELEPHONE ENCOUNTER
Refill Decision Note   Roman Quinn  is requesting a refill authorization.  Brief Assessment and Rationale for Refill:  Approve  Quick Discontinue     Medication Therapy Plan: Flonase signed 12/4/23      Comments:     Note composed:5:13 AM 12/24/2023

## 2023-12-27 DIAGNOSIS — J30.1 ALLERGIC RHINITIS DUE TO POLLEN, UNSPECIFIED SEASONALITY: ICD-10-CM

## 2023-12-27 DIAGNOSIS — Z00.00 WELL ADULT EXAM: Primary | ICD-10-CM

## 2023-12-27 DIAGNOSIS — E78.49 OTHER HYPERLIPIDEMIA: Chronic | ICD-10-CM

## 2023-12-27 DIAGNOSIS — J45.40 MODERATE PERSISTENT ASTHMA WITHOUT COMPLICATION: Chronic | ICD-10-CM

## 2023-12-27 DIAGNOSIS — Z12.5 PROSTATE CANCER SCREENING: ICD-10-CM

## 2023-12-27 DIAGNOSIS — E55.9 VITAMIN D INSUFFICIENCY: ICD-10-CM

## 2023-12-27 RX ORDER — ALBUTEROL SULFATE 90 UG/1
AEROSOL, METERED RESPIRATORY (INHALATION)
Qty: 54 G | Refills: 0 | Status: SHIPPED | OUTPATIENT
Start: 2023-12-27

## 2023-12-27 RX ORDER — FLUTICASONE PROPIONATE 50 MCG
SPRAY, SUSPENSION (ML) NASAL
Qty: 48 G | Refills: 0 | Status: SHIPPED | OUTPATIENT
Start: 2023-12-27 | End: 2024-01-22 | Stop reason: SDUPTHER

## 2023-12-27 NOTE — TELEPHONE ENCOUNTER
No care due was identified.  [facility] Embedded Care Due Messages. Reference number: [id number].   12/27/2023 9:52:56 AM CST

## 2024-01-04 NOTE — PROGRESS NOTES
Occupational Therapy Daily Treatment Note/Discharge Summary     Name: Roman Quinn  Clinic Number: [id number]    Therapy Diagnosis:   Encounter Diagnoses   Name Primary?    Weakness Yes    Right elbow pain        Physician: Elizabet Tobin MD      Physician Orders: Evaluate and treat ; 2x/wk x 6 wks ,  Medical Diagnosis: M77.11 (ICD-10-CM) - Right lateral epicondylitis  Evaluation Date: 10/13/2023  Insurance Authorization Expiration date : 1/12/2024    Date of Return to MD: TBD    Visit # / Visits authorized: 6 / 20  Time In: 8:45 am  Time Out: 9:30 am  Total Billable Time: 45 minutes    Precautions: Standard    Subjective     Pt reports: Significant reduction in pain since CSI    Response to previous treatment: less pain, decrease frequency in pain       Pain: 0/10    Objective     Roman received the following supervised modalities after being cleared for contradictions for 18 minutes:   -  Patient received moist heat  to right  elbow  for 10 minutes to increase blood flow  and pain management and for tissue elasticity prior to therex.     Roman participated in dynamic functional therapeutic exercises: to improve functional performance for minutes, including: 15 minutes   -WF stretches 10 count x 10  -UBE 3'F/3'B  -Green flexbar frowns/smiles, WE/WF/UD/RD 3x10  -Hammer pro/sup 3x10    Roman participated in dynamic functional therapeutic activities to improve functional performance for 15  minutes, including:  -Pathophysiology review  -Joint protection training     Strength: (JORGE Dynamometer in lbs.) Average 3 trials, Position II:       10/13/2023 10/13/2023 11/3/2023 12/21/2023 1/5/2024   Rung 2 Right  Left Right  Right  Right   JORGE # 2 30# 100# 76# 101 145     Home Exercises and Education Provided       Written Home Exercises Provided: Upgraded HEP .  Exercises were reviewed and Roman was able to demonstrate them prior to the end of the session.  Roman demonstrated good   understanding of the education provided.     Pt received a written copy of exercises to perform at home.   See EMR under patient instructions for exercises given.     Roman demonstrated good  understanding of the education provided.       Assessment     Pt has improved significantly since CSI and is not in need of further tx.      Anticipated barriers to occupational therapy: none    Pt's spiritual, cultural and educational needs considered and pt agreeable to plan of care and goals.         Goals met:     1) Patient to be IND with HEP and modalities for pain managment. -met 11/3  2)Pt will increase  strength 10-20 lbs. to improve functional grasp for ADLs/work/leisure activities. -met 11/3  3) Pt will decrease FOTO limitation score by 90 to increase their elbow  functional ability. MET  4) Pt will report use of elbow strap and wrist immobilizer brace to rest tendonitis and increase functional arm use. MET           Plan              D/C from OT.    ODILON Grajeda OTR/l, CHT

## 2024-01-05 ENCOUNTER — CLINICAL SUPPORT (OUTPATIENT)
Dept: REHABILITATION | Facility: HOSPITAL | Age: 39
End: 2024-01-05
Payer: COMMERCIAL

## 2024-01-05 DIAGNOSIS — M25.521 RIGHT ELBOW PAIN: ICD-10-CM

## 2024-01-05 DIAGNOSIS — R53.1 WEAKNESS: Primary | ICD-10-CM

## 2024-01-05 PROCEDURE — 97530 THERAPEUTIC ACTIVITIES: CPT | Mod: PO

## 2024-01-05 PROCEDURE — 97110 THERAPEUTIC EXERCISES: CPT | Mod: PO

## 2024-01-05 PROCEDURE — 97010 HOT OR COLD PACKS THERAPY: CPT | Mod: PO

## 2024-01-05 NOTE — PATIENT INSTRUCTIONS
Flexion / Extension (Resistive)        Wring a towel, using wrist movements only. Do not move other hand.  Flexion: Twist downward. Extension: Twist upward. Twist left. Twist right.  Repeat 20 times. Do 1 session 3-4 times/week.       Pronation / Supination (Resistive)        Hold hammer weighing 12-16 ounces and rotate palm up and down. Keep elbow flexed at side and wrist straight.  Repeat 20 times. Do 1 session 3-4 weeks.

## 2024-01-10 ENCOUNTER — OFFICE VISIT (OUTPATIENT)
Dept: ORTHOPEDICS | Facility: CLINIC | Age: 39
End: 2024-01-10
Payer: COMMERCIAL

## 2024-01-10 ENCOUNTER — HOSPITAL ENCOUNTER (OUTPATIENT)
Dept: RADIOLOGY | Facility: OTHER | Age: 39
Discharge: HOME | End: 2024-01-10
Attending: ORTHOPAEDIC SURGERY
Payer: COMMERCIAL

## 2024-01-10 DIAGNOSIS — G89.29 CHRONIC PAIN OF RIGHT ELBOW: ICD-10-CM

## 2024-01-10 DIAGNOSIS — M25.521 RIGHT ELBOW PAIN: ICD-10-CM

## 2024-01-10 DIAGNOSIS — M77.11 RIGHT LATERAL EPICONDYLITIS: Primary | ICD-10-CM

## 2024-01-10 DIAGNOSIS — M25.521 CHRONIC PAIN OF RIGHT ELBOW: ICD-10-CM

## 2024-01-10 PROCEDURE — 73080 X-RAY EXAM OF ELBOW: CPT | Mod: 26,RT,, | Performed by: RADIOLOGY

## 2024-01-10 PROCEDURE — 73080 X-RAY EXAM OF ELBOW: CPT | Mod: TC,FY,RT

## 2024-01-10 PROCEDURE — 1159F MED LIST DOCD IN RCRD: CPT | Mod: CPTII,S$GLB,, | Performed by: ORTHOPAEDIC SURGERY

## 2024-01-10 PROCEDURE — 1160F RVW MEDS BY RX/DR IN RCRD: CPT | Mod: CPTII,S$GLB,, | Performed by: ORTHOPAEDIC SURGERY

## 2024-01-10 PROCEDURE — 99999 PR PBB SHADOW E&M-EST. PATIENT-LVL III: CPT | Mod: PBBFAC,,, | Performed by: ORTHOPAEDIC SURGERY

## 2024-01-10 PROCEDURE — 99213 OFFICE O/P EST LOW 20 MIN: CPT | Mod: S$GLB,,, | Performed by: ORTHOPAEDIC SURGERY

## 2024-01-10 NOTE — PROGRESS NOTES
Roman Quinn presents for follow up evaluation of   Encounter Diagnoses   Name Primary?    Right lateral epicondylitis Yes    Chronic pain of right elbow      Patient is following up from csi right lateral epicondyle 12.7.23 and OT (AISTM) which. He has been working in therapy and feels that he is much improved. He reports no pain today.     PE:    AA&O x 4.  NAD  HEENT:  NCAT, sclera nonicteric  Lungs:  Respirations are equal and unlabored.  CV:  2+ bilateral upper and lower extremity pulses.  MSK: Right Elbow: nontender to palpation right lateral epicondyle. Neurovascularly intact bilaterally.  5/5 thenar and intrinsic musculature strength.  Otherwise full range of motion hands, wrists and elbows.      X-rays AP, lateral and oblique right elbow taken today are independently reviewed by me and shows no bony or ligamentous abnormality.      ASSESSMENT/PLAN:      38 y.o. yo male with   Encounter Diagnoses   Name Primary?    Right lateral epicondylitis Yes    Chronic pain of right elbow           Plan:  We have discussed the natural history of lateral epicondylitis including treatment options such as splinting, oral and topical anti-inflammatories, cortisone injections and surgery.    Follow up as needed  Call with any questions/concerns in the interim        Elizabet Tobin MD     Please be aware that this note has been generated with the assistance of [employer] voice-to-text.  Please excuse any spelling or grammatical errors.

## 2024-01-18 RX ORDER — AMLODIPINE BESYLATE 5 MG/1
5 TABLET ORAL
Qty: 90 TABLET | Refills: 0 | Status: SHIPPED | OUTPATIENT
Start: 2024-01-18 | End: 2024-02-20

## 2024-01-18 RX ORDER — LISINOPRIL 20 MG/1
20 TABLET ORAL
Qty: 90 TABLET | Refills: 0 | Status: SHIPPED | OUTPATIENT
Start: 2024-01-18 | End: 2024-02-20

## 2024-01-18 NOTE — TELEPHONE ENCOUNTER
Refill Routing Note   Medication(s) are not appropriate for processing by [facility] for the following reason(s):        Required vitals abnormal    [facility] action(s):  Defer               Appointments  past 12m or future 3m with PCP    Date Provider   Last Visit   1/20/2023 Hung Reid MD   Next Visit   Visit date not found uHng Reid MD   ED visits in past 90 days: 0        Note composed:3:20 PM 01/18/2024

## 2024-01-18 NOTE — TELEPHONE ENCOUNTER
No care due was identified.  Health [facility] Embedded Care Due Messages. Reference number: [id number].   1/18/2024 1:01:18 AM CST

## 2024-01-22 DIAGNOSIS — J30.1 ALLERGIC RHINITIS DUE TO POLLEN, UNSPECIFIED SEASONALITY: ICD-10-CM

## 2024-01-22 NOTE — TELEPHONE ENCOUNTER
No care due was identified.  Health [facility] Embedded Care Due Messages. Reference number: [id number].   1/22/2024 4:17:55 PM CST

## 2024-01-23 RX ORDER — FLUTICASONE FUROATE AND VILANTEROL 100; 25 UG/1; UG/1
1 POWDER RESPIRATORY (INHALATION) DAILY
Qty: 180 EACH | Refills: 0 | Status: SHIPPED | OUTPATIENT
Start: 2024-01-23 | End: 2024-02-15 | Stop reason: SDUPTHER

## 2024-01-23 RX ORDER — FLUTICASONE FUROATE AND VILANTEROL TRIFENATATE 100; 25 UG/1; UG/1
1 POWDER RESPIRATORY (INHALATION)
Qty: 180 EACH | Refills: 3 | OUTPATIENT
Start: 2024-01-23

## 2024-01-23 RX ORDER — FLUTICASONE PROPIONATE 50 MCG
SPRAY, SUSPENSION (ML) NASAL
Qty: 48 G | Refills: 0 | Status: SHIPPED | OUTPATIENT
Start: 2024-01-23 | End: 2024-02-15 | Stop reason: SDUPTHER

## 2024-01-23 NOTE — TELEPHONE ENCOUNTER
Refill Decision Note   Roman Quinn  is requesting a refill authorization.  Brief Assessment and Rationale for Refill:  Approve     Medication Therapy Plan:       Medication Reconciliation Completed: No   Comments:     No Care Gaps recommended.     Note composed:10:32 AM 01/23/2024

## 2024-01-23 NOTE — TELEPHONE ENCOUNTER
Refill Decision Note   Roman Quinn  is requesting a refill authorization.  Brief Assessment and Rationale for Refill:  Quick Discontinue     Medication Therapy Plan:         Comments:     Note composed:6:17 AM 01/23/2024

## 2024-01-23 NOTE — TELEPHONE ENCOUNTER
No care due was identified.  [facility] Embedded Care Due Messages. Reference number: [id number].   1/23/2024 5:37:13 AM CST

## 2024-01-23 NOTE — TELEPHONE ENCOUNTER
Refill Routing Note   Medication(s) are not appropriate for processing by [facility] for the following reason(s):        Drug-disease interaction:  fluticasone furoate-vilanteroL and Moderate persistent asthma with acute exacerbation     [facility] action(s):  Defer  Approve      Medication Therapy Plan:       Pharmacist review requested: Yes     Appointments  past 12m or future 3m with PCP    Date Provider   Last Visit   1/20/2023 Hung Reid MD   Next Visit   Visit date not found Hung Reid MD   ED visits in past 90 days: 0        Note composed:5:31 AM 01/23/2024

## 2024-01-27 ENCOUNTER — PATIENT MESSAGE (OUTPATIENT)
Dept: INTERNAL MEDICINE | Facility: CLINIC | Age: 39
End: 2024-01-27
Payer: COMMERCIAL

## 2024-01-29 RX ORDER — ONDANSETRON 8 MG/1
8 TABLET, ORALLY DISINTEGRATING ORAL 3 TIMES DAILY PRN
Qty: 30 TABLET | Refills: 0 | Status: SHIPPED | OUTPATIENT
Start: 2024-01-29

## 2024-01-29 NOTE — TELEPHONE ENCOUNTER
I have sent a prescription of Zofran to the patient's pharmacy to use as needed for nausea and vomiting.  Thank you.

## 2024-02-15 DIAGNOSIS — J30.1 ALLERGIC RHINITIS DUE TO POLLEN, UNSPECIFIED SEASONALITY: ICD-10-CM

## 2024-02-15 RX ORDER — FLUTICASONE FUROATE AND VILANTEROL 100; 25 UG/1; UG/1
1 POWDER RESPIRATORY (INHALATION) DAILY
Qty: 180 EACH | Refills: 0 | Status: SHIPPED | OUTPATIENT
Start: 2024-02-15 | End: 2024-02-21

## 2024-02-15 RX ORDER — FLUTICASONE PROPIONATE 50 MCG
SPRAY, SUSPENSION (ML) NASAL
Qty: 48 G | Refills: 0 | Status: SHIPPED | OUTPATIENT
Start: 2024-02-15 | End: 2024-02-24 | Stop reason: SDUPTHER

## 2024-02-15 NOTE — TELEPHONE ENCOUNTER
No care due was identified.  [facility] Embedded Care Due Messages. Reference number: [id number].   2/15/2024 11:46:05 AM CST

## 2024-02-20 RX ORDER — AMLODIPINE BESYLATE 5 MG/1
5 TABLET ORAL
Qty: 90 TABLET | Refills: 0 | Status: SHIPPED | OUTPATIENT
Start: 2024-02-20 | End: 2024-03-07 | Stop reason: SDUPTHER

## 2024-02-20 RX ORDER — LISINOPRIL 20 MG/1
20 TABLET ORAL
Qty: 90 TABLET | Refills: 0 | Status: SHIPPED | OUTPATIENT
Start: 2024-02-20 | End: 2024-03-07 | Stop reason: SDUPTHER

## 2024-02-20 NOTE — TELEPHONE ENCOUNTER
Refill Routing Note   Medication(s) are not appropriate for processing by [facility] for the following reason(s):        Required vitals abnormal    [facility] action(s):  Defer               Appointments  past 12m or future 3m with PCP    Date Provider   Last Visit   1/20/2023 Hung Reid MD   Next Visit   3/7/2024 Hung Reid MD   ED visits in past 90 days: 0        Note composed:8:43 AM 02/20/2024

## 2024-02-20 NOTE — TELEPHONE ENCOUNTER
No care due was identified.  Health [facility] Embedded Care Due Messages. Reference number: [id number].   2/20/2024 5:40:08 AM CST

## 2024-02-21 RX ORDER — FLUTICASONE FUROATE AND VILANTEROL 100; 25 UG/1; UG/1
1 POWDER RESPIRATORY (INHALATION)
Qty: 180 EACH | Refills: 0 | Status: SHIPPED | OUTPATIENT
Start: 2024-02-21 | End: 2024-03-07 | Stop reason: SDUPTHER

## 2024-02-22 NOTE — TELEPHONE ENCOUNTER
Refill Decision Note   Roman Quinn  is requesting a refill authorization.  Brief Assessment and Rationale for Refill:  Approve     Medication Therapy Plan:  Alternate pharmacy requesting; Drug-Disease: fluticasone furoate-vilanteroL and Moderate persistent asthma with acute exacerbation (Overridden by Hung Reid MD on Feb 15, 2024)      Alert overridden per protocol: Yes   Comments:     Note composed:7:14 PM 02/21/2024

## 2024-02-22 NOTE — TELEPHONE ENCOUNTER
No care due was identified.  [facility] Embedded Care Due Messages. Reference number: [id number].   2/21/2024 6:45:38 PM CST

## 2024-02-24 ENCOUNTER — ON-DEMAND VIRTUAL (OUTPATIENT)
Dept: URGENT CARE | Facility: CLINIC | Age: 39
End: 2024-02-24
Payer: COMMERCIAL

## 2024-02-24 DIAGNOSIS — J01.00 ACUTE NON-RECURRENT MAXILLARY SINUSITIS: Primary | ICD-10-CM

## 2024-02-24 DIAGNOSIS — J30.1 ALLERGIC RHINITIS DUE TO POLLEN, UNSPECIFIED SEASONALITY: ICD-10-CM

## 2024-02-24 PROCEDURE — 99214 OFFICE O/P EST MOD 30 MIN: CPT | Mod: 95,,, | Performed by: NURSE PRACTITIONER

## 2024-02-24 RX ORDER — LEVOCETIRIZINE DIHYDROCHLORIDE 5 MG/1
5 TABLET, FILM COATED ORAL NIGHTLY
Qty: 30 TABLET | Refills: 2 | Status: SHIPPED | OUTPATIENT
Start: 2024-02-24 | End: 2024-05-24

## 2024-02-24 RX ORDER — AMOXICILLIN AND CLAVULANATE POTASSIUM 875; 125 MG/1; MG/1
1 TABLET, FILM COATED ORAL EVERY 12 HOURS
Qty: 14 TABLET | Refills: 0 | Status: SHIPPED | OUTPATIENT
Start: 2024-02-24 | End: 2024-03-02

## 2024-02-24 RX ORDER — FLUTICASONE PROPIONATE 50 MCG
SPRAY, SUSPENSION (ML) NASAL
Qty: 48 G | Refills: 0 | Status: SHIPPED | OUTPATIENT
Start: 2024-02-24

## 2024-02-24 NOTE — PROGRESS NOTES
Subjective:      Patient ID: Roman Quinn is a 38 y.o. male.    Vitals:  vitals were not taken for this visit.     Chief Complaint: Sinus Problem      Visit Type: TELE AUDIOVISUAL    Present with the patient at the time of consultation: TELEMED PRESENT WITH PATIENT: None    Past Medical History:   Diagnosis Date    Allergic conjunctivitis     Asthma     Hyperlipidemia     Hypertension      Past Surgical History:   Procedure Laterality Date    HERNIA REPAIR       Review of patient's allergies indicates:   Allergen Reactions    Iodine and iodide containing products Anaphylaxis     Throat closing & itching.     Current Outpatient Medications on File Prior to Visit   Medication Sig Dispense Refill    albuterol (PROVENTIL/VENTOLIN HFA) 90 mcg/actuation inhaler Inhale 2 puffs into the lungs every 6 (six) hours as needed for Wheezing or Shortness of Breath. Rescue 54 g 0    albuterol-ipratropium (DUO-NEB) 2.5 mg-0.5 mg/3 mL nebulizer solution Take 3 mLs by nebulization every 6 (six) hours as needed for Wheezing. Rescue 75 mL 5    amLODIPine (NORVASC) 5 MG tablet TAKE 1 TABLET(5 MG) BY MOUTH EVERY DAY 90 tablet 0    diazePAM (VALIUM) 5 MG tablet Take 2 pills 30 min prior to procedure 2 tablet 0    fluticasone furoate-vilanteroL (BREO) 100-25 mcg/dose diskus inhaler INHALE 1 PUFF INTO THE LUNGS EVERY  each 0    lisinopriL (PRINIVIL,ZESTRIL) 20 MG tablet TAKE 1 TABLET(20 MG) BY MOUTH EVERY DAY 90 tablet 0    ondansetron (ZOFRAN-ODT) 8 MG TbDL Take 1 tablet (8 mg total) by mouth 3 (three) times daily as needed (nausea/vomiting). 30 tablet 0    simvastatin (ZOCOR) 40 MG tablet TAKE 1 TABLET BY MOUTH IN THE  EVENING 90 tablet 0    [DISCONTINUED] fluticasone propionate (FLONASE) 50 mcg/actuation nasal spray SHAKE LIQUID AND USE 2 SPRAYS(100 MCG) IN EACH NOSTRIL TWICE DAILY 48 g 0     No current facility-administered medications on file prior to visit.     Family History   Problem Relation Age of Onset    Diabetes  Mother     Cancer Maternal Grandmother     Cancer Paternal Grandmother     No Known Problems Father     No Known Problems Sister     Skin cancer Neg Hx     Melanoma Neg Hx     Allergic rhinitis Neg Hx     Asthma Neg Hx     Immunodeficiency Neg Hx     Rhinitis Neg Hx     Urticaria Neg Hx     Eczema Neg Hx     Atopy Neg Hx     Angioedema Neg Hx     Allergies Neg Hx        Medications Ordered                [id number] - [place] - [address] AT [facility] & [address]   [address], [place] [address]    Telephone: [phone]   Fax: [phone]   Hours: Not open 24 hours                         E-Prescribed (3 of 3)              amoxicillin-clavulanate 875-125mg (AUGMENTIN) 875-125 mg per tablet    Sig: Take 1 tablet by mouth every 12 (twelve) hours. for 7 days       Start: 2/24/24     Quantity: 14 tablet Refills: 0                         fluticasone propionate (FLONASE) 50 mcg/actuation nasal spray    Sig: SHAKE LIQUID AND USE 2 SPRAYS(100 MCG) IN EACH NOSTRIL TWICE DAILY       Start: 2/24/24     Quantity: 48 g Refills: 0                         levocetirizine (XYZAL) 5 MG tablet    Sig: Take 1 tablet (5 mg total) by mouth every evening.       Start: 2/24/24     Quantity: 30 tablet Refills: 2                           Ohs Peq Odvv Intake    2/24/2024 12:31 PM CST - Filed by Patient   What is your current physical address in the event of a medical emergency? Painful sinus infection   Are you able to take your vital signs? No   Please attach any relevant images or files          Sinus Problem  This is a new problem. The current episode started 1 to 4 weeks ago. The problem has been waxing and waning since onset. The pain is moderate. Associated symptoms include congestion, coughing and sinus pressure. Pertinent negatives include no chills, diaphoresis, ear pain, headaches, hoarse voice, neck pain, shortness of breath, sneezing, sore throat or swollen glands. Past treatments  include nasal decongestants. The treatment provided mild (started feeling better after day 5/6 then symptoms worsened yesterday) relief.       Constitution: Negative for activity change, appetite change, chills and sweating.   HENT:  Positive for congestion, postnasal drip, sinus pain and sinus pressure. Negative for ear pain, sore throat, trouble swallowing and voice change.    Neck: Negative for neck pain.   Respiratory:  Positive for cough. Negative for shortness of breath.    Allergic/Immunologic: Negative for sneezing.   Neurological:  Negative for headaches.        Objective:   The physical exam was conducted virtually.  Physical Exam   Constitutional:  Non-toxic appearance. No distress. normal  HENT:   Head: Normocephalic and atraumatic.   Nose: Right sinus exhibits maxillary sinus tenderness (per patient report and palpation) and frontal sinus tenderness (per patient report and palpation). Left sinus exhibits no maxillary sinus tenderness and no frontal sinus tenderness.   Pulmonary/Chest: Effort normal.   Abdominal: Normal appearance.   Neurological: He is alert.       Assessment:     1. Acute non-recurrent maxillary sinusitis    2. Allergic rhinitis due to pollen, unspecified seasonality        Plan:       Acute non-recurrent maxillary sinusitis  -     amoxicillin-clavulanate 875-125mg (AUGMENTIN) 875-125 mg per tablet; Take 1 tablet by mouth every 12 (twelve) hours. for 7 days  Dispense: 14 tablet; Refill: 0  -     levocetirizine (XYZAL) 5 MG tablet; Take 1 tablet (5 mg total) by mouth every evening.  Dispense: 30 tablet; Refill: 2  -     fluticasone propionate (FLONASE) 50 mcg/actuation nasal spray; SHAKE LIQUID AND USE 2 SPRAYS(100 MCG) IN EACH NOSTRIL TWICE DAILY  Dispense: 48 g; Refill: 0    Allergic rhinitis due to pollen, unspecified seasonality  -     fluticasone propionate (FLONASE) 50 mcg/actuation nasal spray; SHAKE LIQUID AND USE 2 SPRAYS(100 MCG) IN EACH NOSTRIL TWICE DAILY  Dispense: 48 g;  Refill: 0      Patient Instructions   Nasal spray daily and antihistamine 2 times daily for the next 3-5 days.  Saline nasal rinses daily if tolerated.  Plain mucinex every 4 hours with a full bottle water for at least the next 24 hours then as needed for congestion.  Alternate tylenol and motrin/ibuprofen every 4-6 hours for the next 24-48 hours for fever.  Increase water/propel intake.  Follow up with your PCP in the next 5-7 days or sooner to urgent care/ED if symptoms worsen or fail to improve.          I counseled the patient on general home care guidelines for cough and congestion including increasing fluid intake, getting plenty of rest and use of OTC cough and cold medications.  I recommended guafenesin for congestion and dextromethorphan as directed for cough.  A brand like Mucinex DM is recommended.  Avoidance of decongestants is recommended for patients with heart problems and hypertension.  Extra vitamin C may also benefit.  Return to clinic if symptoms last longer than 10 days or sooner if worsen with symptoms like fever > 100.4, severe sinus pain or headache, thick yellow nasal discharge or sputum, dehydration or lethargy.     Patient education provided.  All questions and concerns addressed  RTC PRN and if symptoms worsen or fail to improve  Patient verbalizes understanding

## 2024-02-24 NOTE — PATIENT INSTRUCTIONS
Nasal spray daily and antihistamine 2 times daily for the next 3-5 days.  Saline nasal rinses daily if tolerated.  Plain mucinex every 4 hours with a full bottle water for at least the next 24 hours then as needed for congestion.  Alternate tylenol and motrin/ibuprofen every 4-6 hours for the next 24-48 hours for fever.  Increase water/propel intake.  Follow up with your PCP in the next 5-7 days or sooner to urgent care/ED if symptoms worsen or fail to improve.

## 2024-03-05 ENCOUNTER — LAB VISIT (OUTPATIENT)
Dept: LAB | Facility: HOSPITAL | Age: 39
End: 2024-03-05
Attending: FAMILY MEDICINE
Payer: COMMERCIAL

## 2024-03-05 DIAGNOSIS — E78.49 OTHER HYPERLIPIDEMIA: Chronic | ICD-10-CM

## 2024-03-05 DIAGNOSIS — Z12.5 PROSTATE CANCER SCREENING: ICD-10-CM

## 2024-03-05 DIAGNOSIS — Z00.00 WELL ADULT EXAM: ICD-10-CM

## 2024-03-05 DIAGNOSIS — E55.9 VITAMIN D INSUFFICIENCY: ICD-10-CM

## 2024-03-05 LAB
25(OH)D3+25(OH)D2 SERPL-MCNC: 34 NG/ML (ref 30–96)
ALBUMIN SERPL BCP-MCNC: 4 G/DL (ref 3.5–5.2)
ALP SERPL-CCNC: 58 U/L (ref 55–135)
ALT SERPL W/O P-5'-P-CCNC: 25 U/L (ref 10–44)
ANION GAP SERPL CALC-SCNC: 11 MMOL/L (ref 8–16)
AST SERPL-CCNC: 31 U/L (ref 10–40)
BASOPHILS # BLD AUTO: 0.05 K/UL (ref 0–0.2)
BASOPHILS NFR BLD: 0.8 % (ref 0–1.9)
BILIRUB SERPL-MCNC: 0.4 MG/DL (ref 0.1–1)
BILIRUB UR QL STRIP: NEGATIVE
BUN SERPL-MCNC: 22 MG/DL (ref 6–20)
CALCIUM SERPL-MCNC: 9.7 MG/DL (ref 8.7–10.5)
CHLORIDE SERPL-SCNC: 106 MMOL/L (ref 95–110)
CHOLEST SERPL-MCNC: 187 MG/DL (ref 120–199)
CHOLEST/HDLC SERPL: 4.3 {RATIO} (ref 2–5)
CLARITY UR REFRACT.AUTO: CLEAR
CO2 SERPL-SCNC: 24 MMOL/L (ref 23–29)
COLOR UR AUTO: YELLOW
COMPLEXED PSA SERPL-MCNC: 1.3 NG/ML (ref 0–4)
CREAT SERPL-MCNC: 1 MG/DL (ref 0.5–1.4)
DIFFERENTIAL METHOD BLD: NORMAL
EOSINOPHIL # BLD AUTO: 0.1 K/UL (ref 0–0.5)
EOSINOPHIL NFR BLD: 2 % (ref 0–8)
ERYTHROCYTE [DISTWIDTH] IN BLOOD BY AUTOMATED COUNT: 12.7 % (ref 11.5–14.5)
EST. GFR  (NO RACE VARIABLE): >60 ML/MIN/1.73 M^2
ESTIMATED AVG GLUCOSE: 114 MG/DL (ref 68–131)
GLUCOSE SERPL-MCNC: 113 MG/DL (ref 70–110)
GLUCOSE UR QL STRIP: NEGATIVE
HBA1C MFR BLD: 5.6 % (ref 4–5.6)
HCT VFR BLD AUTO: 48.1 % (ref 40–54)
HDLC SERPL-MCNC: 44 MG/DL (ref 40–75)
HDLC SERPL: 23.5 % (ref 20–50)
HGB BLD-MCNC: 15.5 G/DL (ref 14–18)
HGB UR QL STRIP: NEGATIVE
IMM GRANULOCYTES # BLD AUTO: 0.01 K/UL (ref 0–0.04)
IMM GRANULOCYTES NFR BLD AUTO: 0.2 % (ref 0–0.5)
KETONES UR QL STRIP: NEGATIVE
LDLC SERPL CALC-MCNC: 124.8 MG/DL (ref 63–159)
LEUKOCYTE ESTERASE UR QL STRIP: NEGATIVE
LYMPHOCYTES # BLD AUTO: 2 K/UL (ref 1–4.8)
LYMPHOCYTES NFR BLD: 34.3 % (ref 18–48)
MCH RBC QN AUTO: 29.9 PG (ref 27–31)
MCHC RBC AUTO-ENTMCNC: 32.2 G/DL (ref 32–36)
MCV RBC AUTO: 93 FL (ref 82–98)
MONOCYTES # BLD AUTO: 0.5 K/UL (ref 0.3–1)
MONOCYTES NFR BLD: 9.1 % (ref 4–15)
NEUTROPHILS # BLD AUTO: 3.2 K/UL (ref 1.8–7.7)
NEUTROPHILS NFR BLD: 53.6 % (ref 38–73)
NITRITE UR QL STRIP: NEGATIVE
NONHDLC SERPL-MCNC: 143 MG/DL
NRBC BLD-RTO: 0 /100 WBC
PH UR STRIP: 6 [PH] (ref 5–8)
PLATELET # BLD AUTO: 357 K/UL (ref 150–450)
PMV BLD AUTO: 10.4 FL (ref 9.2–12.9)
POTASSIUM SERPL-SCNC: 5 MMOL/L (ref 3.5–5.1)
PROT SERPL-MCNC: 7.2 G/DL (ref 6–8.4)
PROT UR QL STRIP: NEGATIVE
RBC # BLD AUTO: 5.19 M/UL (ref 4.6–6.2)
SODIUM SERPL-SCNC: 141 MMOL/L (ref 136–145)
SP GR UR STRIP: >1.03 (ref 1–1.03)
T4 FREE SERPL-MCNC: 0.93 NG/DL (ref 0.71–1.51)
TRIGL SERPL-MCNC: 91 MG/DL (ref 30–150)
TSH SERPL DL<=0.005 MIU/L-ACNC: 1.22 UIU/ML (ref 0.4–4)
URN SPEC COLLECT METH UR: ABNORMAL
WBC # BLD AUTO: 5.94 K/UL (ref 3.9–12.7)

## 2024-03-05 PROCEDURE — 84153 ASSAY OF PSA TOTAL: CPT | Performed by: FAMILY MEDICINE

## 2024-03-05 PROCEDURE — 83036 HEMOGLOBIN GLYCOSYLATED A1C: CPT | Performed by: FAMILY MEDICINE

## 2024-03-05 PROCEDURE — 36415 COLL VENOUS BLD VENIPUNCTURE: CPT | Mod: PO | Performed by: FAMILY MEDICINE

## 2024-03-05 PROCEDURE — 82306 VITAMIN D 25 HYDROXY: CPT | Performed by: FAMILY MEDICINE

## 2024-03-05 PROCEDURE — 84443 ASSAY THYROID STIM HORMONE: CPT | Performed by: FAMILY MEDICINE

## 2024-03-05 PROCEDURE — 80053 COMPREHEN METABOLIC PANEL: CPT | Performed by: FAMILY MEDICINE

## 2024-03-05 PROCEDURE — 84439 ASSAY OF FREE THYROXINE: CPT | Performed by: FAMILY MEDICINE

## 2024-03-05 PROCEDURE — 80061 LIPID PANEL: CPT | Performed by: FAMILY MEDICINE

## 2024-03-05 PROCEDURE — 81003 URINALYSIS AUTO W/O SCOPE: CPT | Performed by: FAMILY MEDICINE

## 2024-03-05 PROCEDURE — 85025 COMPLETE CBC W/AUTO DIFF WBC: CPT | Performed by: FAMILY MEDICINE

## 2024-03-07 ENCOUNTER — OFFICE VISIT (OUTPATIENT)
Dept: INTERNAL MEDICINE | Facility: CLINIC | Age: 39
End: 2024-03-07
Payer: COMMERCIAL

## 2024-03-07 VITALS
WEIGHT: 255.5 LBS | RESPIRATION RATE: 17 BRPM | TEMPERATURE: 97 F | DIASTOLIC BLOOD PRESSURE: 78 MMHG | OXYGEN SATURATION: 98 % | HEIGHT: 72 IN | SYSTOLIC BLOOD PRESSURE: 118 MMHG | BODY MASS INDEX: 34.61 KG/M2 | HEART RATE: 97 BPM

## 2024-03-07 DIAGNOSIS — Z00.00 WELL ADULT EXAM: Primary | ICD-10-CM

## 2024-03-07 DIAGNOSIS — E66.9 CLASS 1 OBESITY WITH SERIOUS COMORBIDITY AND BODY MASS INDEX (BMI) OF 34.0 TO 34.9 IN ADULT, UNSPECIFIED OBESITY TYPE: ICD-10-CM

## 2024-03-07 DIAGNOSIS — J45.41 MODERATE PERSISTENT ASTHMA WITH ACUTE EXACERBATION: ICD-10-CM

## 2024-03-07 DIAGNOSIS — E78.49 OTHER HYPERLIPIDEMIA: Chronic | ICD-10-CM

## 2024-03-07 DIAGNOSIS — E55.9 VITAMIN D INSUFFICIENCY: ICD-10-CM

## 2024-03-07 DIAGNOSIS — I10 ESSENTIAL HYPERTENSION: Chronic | ICD-10-CM

## 2024-03-07 PROBLEM — J98.2 PNEUMOMEDIASTINUM: Status: RESOLVED | Noted: 2018-10-17 | Resolved: 2024-03-07

## 2024-03-07 PROBLEM — E66.811 CLASS 1 OBESITY WITH SERIOUS COMORBIDITY AND BODY MASS INDEX (BMI) OF 34.0 TO 34.9 IN ADULT: Status: ACTIVE | Noted: 2024-03-07

## 2024-03-07 PROCEDURE — 3078F DIAST BP <80 MM HG: CPT | Mod: CPTII,S$GLB,, | Performed by: FAMILY MEDICINE

## 2024-03-07 PROCEDURE — 3008F BODY MASS INDEX DOCD: CPT | Mod: CPTII,S$GLB,, | Performed by: FAMILY MEDICINE

## 2024-03-07 PROCEDURE — 99999 PR PBB SHADOW E&M-EST. PATIENT-LVL IV: CPT | Mod: PBBFAC,,, | Performed by: FAMILY MEDICINE

## 2024-03-07 PROCEDURE — 1160F RVW MEDS BY RX/DR IN RCRD: CPT | Mod: CPTII,S$GLB,, | Performed by: FAMILY MEDICINE

## 2024-03-07 PROCEDURE — 3074F SYST BP LT 130 MM HG: CPT | Mod: CPTII,S$GLB,, | Performed by: FAMILY MEDICINE

## 2024-03-07 PROCEDURE — 1159F MED LIST DOCD IN RCRD: CPT | Mod: CPTII,S$GLB,, | Performed by: FAMILY MEDICINE

## 2024-03-07 PROCEDURE — 4010F ACE/ARB THERAPY RXD/TAKEN: CPT | Mod: CPTII,S$GLB,, | Performed by: FAMILY MEDICINE

## 2024-03-07 PROCEDURE — 99396 PREV VISIT EST AGE 40-64: CPT | Mod: S$GLB,,, | Performed by: FAMILY MEDICINE

## 2024-03-07 PROCEDURE — 3044F HG A1C LEVEL LT 7.0%: CPT | Mod: CPTII,S$GLB,, | Performed by: FAMILY MEDICINE

## 2024-03-07 RX ORDER — FLUTICASONE FUROATE AND VILANTEROL 100; 25 UG/1; UG/1
1 POWDER RESPIRATORY (INHALATION) DAILY
Qty: 180 EACH | Refills: 3 | Status: SHIPPED | OUTPATIENT
Start: 2024-03-07

## 2024-03-07 RX ORDER — SIMVASTATIN 40 MG/1
40 TABLET, FILM COATED ORAL NIGHTLY
Qty: 90 TABLET | Refills: 3 | Status: SHIPPED | OUTPATIENT
Start: 2024-03-07

## 2024-03-07 RX ORDER — LISINOPRIL 20 MG/1
20 TABLET ORAL DAILY
Qty: 90 TABLET | Refills: 3 | Status: SHIPPED | OUTPATIENT
Start: 2024-03-07

## 2024-03-07 RX ORDER — AMLODIPINE BESYLATE 5 MG/1
5 TABLET ORAL DAILY
Qty: 90 TABLET | Refills: 3 | Status: SHIPPED | OUTPATIENT
Start: 2024-03-07

## 2024-03-07 NOTE — PROGRESS NOTES
Subjective     Patient ID: Roman Quinn is a 38 y.o. male.    Chief Complaint: Annual Exam    HPI 38-year-old white male presents to clinic today for annual physical exam.  Hypertension remains well controlled on amlodipine 5 mg daily and lisinopril 20 mg daily.  Hyperlipidemia is stable on simvastatin 40 mg daily.  Asthma remains well controlled on Breo 100/25 mcg per inhalation 1 inhalation daily.  He reports a past surgical history of hernia repair and vasectomy.  He reports a family history of his mother having diabetes.  He is up-to-date with all vaccinations.  Review of Systems   Constitutional:  Negative for activity change, appetite change, chills, fatigue, fever and unexpected weight change.   HENT:  Negative for nasal congestion, ear pain, hearing loss, postnasal drip, rhinorrhea, sinus pressure/congestion, sore throat, tinnitus and trouble swallowing.    Eyes:  Negative for discharge, redness, itching and visual disturbance.   Respiratory:  Negative for cough, chest tightness, shortness of breath and wheezing.    Cardiovascular:  Negative for chest pain and palpitations.   Gastrointestinal:  Negative for abdominal pain, blood in stool, constipation, diarrhea, nausea and vomiting.   Endocrine: Negative for polydipsia and polyuria.   Genitourinary:  Negative for decreased urine volume, difficulty urinating, dysuria, frequency, hematuria and urgency.   Musculoskeletal:  Positive for back pain. Negative for arthralgias, joint swelling, myalgias, neck pain and neck stiffness.   Integumentary:  Negative for rash.   Neurological:  Negative for dizziness, weakness, light-headedness and headaches.   Psychiatric/Behavioral: Negative.  Negative for confusion and dysphoric mood.           Objective     Physical Exam  Vitals and nursing note reviewed.   Constitutional:       General: He is not in acute distress.     Appearance: Normal appearance. He is well-developed. He is not diaphoretic.   HENT:      Head:  Normocephalic and atraumatic.      Right Ear: External ear normal.      Left Ear: External ear normal.      Nose: Nose normal.      Mouth/Throat:      Pharynx: No oropharyngeal exudate.   Eyes:      General: No scleral icterus.        Right eye: No discharge.         Left eye: No discharge.      Conjunctiva/sclera: Conjunctivae normal.      Pupils: Pupils are equal, round, and reactive to light.   Neck:      Thyroid: No thyromegaly.      Vascular: No JVD.      Trachea: No tracheal deviation.   Cardiovascular:      Rate and Rhythm: Normal rate and regular rhythm.      Heart sounds: Normal heart sounds. No murmur heard.     No friction rub. No gallop.   Pulmonary:      Effort: Pulmonary effort is normal. No respiratory distress.      Breath sounds: Normal breath sounds. No stridor. No wheezing, rhonchi or rales.   Chest:      Chest wall: No tenderness.   Abdominal:      General: Bowel sounds are normal. There is no distension.      Palpations: Abdomen is soft. There is no mass.      Tenderness: There is no abdominal tenderness. There is no guarding or rebound.   Musculoskeletal:         General: No tenderness. Normal range of motion.      Cervical back: Normal range of motion and neck supple.   Lymphadenopathy:      Cervical: No cervical adenopathy.   Skin:     General: Skin is warm and dry.      Coloration: Skin is not pale.      Findings: No erythema or rash.   Neurological:      Mental Status: He is alert and oriented to person, place, and time.   Psychiatric:         Mood and Affect: Mood normal.         Behavior: Behavior normal.         Thought Content: Thought content normal.         Judgment: Judgment normal.            Assessment and Plan     1. Well adult exam    2. Essential hypertension  -     amLODIPine (NORVASC) 5 MG tablet; Take 1 tablet (5 mg total) by mouth once daily.  Dispense: 90 tablet; Refill: 3  -     lisinopriL (PRINIVIL,ZESTRIL) 20 MG tablet; Take 1 tablet (20 mg total) by mouth once daily.   Dispense: 90 tablet; Refill: 3    3. Other hyperlipidemia  -     simvastatin (ZOCOR) 40 MG tablet; Take 1 tablet (40 mg total) by mouth every evening.  Dispense: 90 tablet; Refill: 3    4. Vitamin D insufficiency    5. Moderate persistent asthma with acute exacerbation  -     fluticasone furoate-vilanteroL (BREO) 100-25 mcg/dose diskus inhaler; Inhale 1 puff into the lungs once daily.  Dispense: 180 each; Refill: 3    6. Class 1 obesity with serious comorbidity and body mass index (BMI) of 34.0 to 34.9 in adult, unspecified obesity type        1. Labs have been reviewed and are within limits.  2. Continue amlodipine 5 mg daily and lisinopril 20 mg daily.  Hypertension is well controlled.  3. Continue simvastatin 40 mg daily.  Hyperlipidemia is stable.    4. Continue Breo 100/25 mcg per inhalation 1 inhalation daily.  Asthma is well controlled.  5. Continue diet and exercise.  The patient has lost 5 lb over the past year.  6. Return to clinic as needed or in 1 year for annual physical exam.             Follow up in about 1 year (around 3/7/2025), or if symptoms worsen or fail to improve, for Annual exam.

## 2024-04-10 DIAGNOSIS — J45.40 MODERATE PERSISTENT ASTHMA WITHOUT COMPLICATION: Chronic | ICD-10-CM

## 2024-04-10 RX ORDER — ALBUTEROL SULFATE 90 UG/1
AEROSOL, METERED RESPIRATORY (INHALATION)
Qty: 54 G | Refills: 3 | Status: SHIPPED | OUTPATIENT
Start: 2024-04-10

## 2024-04-10 NOTE — TELEPHONE ENCOUNTER
No care due was identified.  Health [facility] Embedded Care Due Messages. Reference number: [id number].   4/10/2024 4:57:26 AM CDT

## 2024-04-10 NOTE — TELEPHONE ENCOUNTER
Refill Decision Note   Roman Quinn  is requesting a refill authorization.  Brief Assessment and Rationale for Refill:  Approve     Medication Therapy Plan:        Alert overridden per protocol: Yes   Comments:     Note composed:12:51 PM 04/10/2024

## 2024-09-18 ENCOUNTER — E-VISIT (OUTPATIENT)
Dept: FAMILY MEDICINE | Facility: CLINIC | Age: 39
End: 2024-09-18
Payer: COMMERCIAL

## 2024-09-18 DIAGNOSIS — R05.2 SUBACUTE COUGH: Primary | ICD-10-CM

## 2024-09-18 DIAGNOSIS — J01.00 SUBACUTE MAXILLARY SINUSITIS: ICD-10-CM

## 2024-09-18 RX ORDER — DOXYCYCLINE HYCLATE 100 MG
100 TABLET ORAL 2 TIMES DAILY
Qty: 14 TABLET | Refills: 0 | Status: SHIPPED | OUTPATIENT
Start: 2024-09-18 | End: 2024-09-25

## 2024-09-18 RX ORDER — PROMETHAZINE HYDROCHLORIDE AND DEXTROMETHORPHAN HYDROBROMIDE 6.25; 15 MG/5ML; MG/5ML
5 SYRUP ORAL EVERY 6 HOURS PRN
Qty: 118 ML | Refills: 1 | Status: SHIPPED | OUTPATIENT
Start: 2024-09-18 | End: 2024-09-28

## 2024-09-18 RX ORDER — OXYMETAZOLINE HCL 0.05 %
2 SPRAY, NON-AEROSOL (ML) NASAL 2 TIMES DAILY
Qty: 6 ML | Refills: 0 | Status: SHIPPED | OUTPATIENT
Start: 2024-09-18 | End: 2024-09-21

## 2024-09-18 NOTE — PROGRESS NOTES
Patient ID: Roman Quinn is a 39 y.o. male.    Chief Complaint: General Illness (Entered automatically based on patient selection in [employer].)          274}  The patient initiated a request through [employer] on 9/18/2024 for evaluation and management with a chief complaint of General Illness (Entered automatically based on patient selection in [employer].)     I evaluated the questionnaire submission on 09/18/2024 .    Total Time (in minutes): 12     Ohs Peq Evisit Supergroup-Cough And Cold    9/18/2024  8:26 AM CDT - Filed by Patient   What do you need help with? Cough, Cold, Sore Throat   Do you agree to participate in an E-Visit? Yes   If you have any of the following symptoms, go to your local emergency room or call 911: I acknowledge   What is the main issue you would like addressed today? It seems this happens early every fall, but i have what feels like a chest cold. I keep coughing up a semi solid, louise discharge. I so far have used mucinex and just vicks vaporub to relieve but thats not enough. This started a day and a half ago.   Do you think you might have COVID or the Flu? No   Have you tested positive for COVID or Flu? No   What symptoms do you currently have?  Cough;  Nasal Congestion   Describe your cough: Productive (containing mucus)   Describe the mucus: Yellow;  Thick   Have you ever smoked? I have never smoked   Have you had a fever? No   When did your symptoms first appear? 9/16/2024   In the last two weeks, have you been in close contact with someone who has COVID-19 or the Flu? No   List what you have done or taken to help your symptoms. Mucinex and Vicks vaporub   How severe are your symptoms? Moderate   Have your symptoms gotten better or worse since they started?  Worse   Do you have transportation to get testing if it is needed and ordered for you at an [facility] location? Yes   Provide any additional information you feel is important.    Please attach any relevant images or files    Are  you able to take your vital signs? No          Active Problem List with Overview Notes    Diagnosis Date Noted    Class 1 obesity with serious comorbidity and body mass index (BMI) of 34.0 to 34.9 in adult 2024    Weakness 10/13/2023    Right elbow pain 10/13/2023    Nonintractable episodic headache 2023    Vitamin D insufficiency 2021    Impaired fasting glucose 2017    Essential hypertension 2013    Hyperlipidemia 2013    AR (allergic rhinitis) 2013    Moderate persistent asthma with acute exacerbation 2013      Recent Labs Obtained:  Lab Results   Component Value Date    WBC 5.94 2024    HGB 15.5 2024    HCT 48.1 2024    MCV 93 2024     2024     2024    K 5.0 2024     (H) 2024    CREATININE 1.0 2024    EGFRNORACEVR >60.0 2024    HGBA1C 5.6 2024    TSH 1.219 2024      Review of patient's allergies indicates:   Allergen Reactions    Iodine and iodide containing products Anaphylaxis     Throat closing & itching.       Encounter Diagnoses   Name Primary?    Subacute cough Yes    Subacute maxillary sinusitis         No orders of the defined types were placed in this encounter.     Medications Ordered This Encounter   Medications    doxycycline (VIBRA-TABS) 100 MG tablet     Sig: Take 1 tablet (100 mg total) by mouth 2 (two) times daily. for 7 days     Dispense:  14 tablet     Refill:  0    oxymetazoline (AFRIN, OXYMETAZOLINE,) 0.05 % nasal spray     Si sprays by Nasal route 2 (two) times daily. Do not take over 3 days due to rebound congestion for 3 days     Dispense:  6 mL     Refill:  0    promethazine-dextromethorphan (PROMETHAZINE-DM) 6.25-15 mg/5 mL Syrp     Sig: Take 5 mLs by mouth every 6 (six) hours as needed (cough).     Dispense:  118 mL     Refill:  1        E-Visit Time Tracking:    Day 1 Time (in minutes): 12    Total Time (in minutes): 12      274}

## 2024-10-26 DIAGNOSIS — J30.1 ALLERGIC RHINITIS DUE TO POLLEN, UNSPECIFIED SEASONALITY: ICD-10-CM

## 2024-10-26 DIAGNOSIS — J01.00 ACUTE NON-RECURRENT MAXILLARY SINUSITIS: ICD-10-CM

## 2024-10-27 RX ORDER — FLUTICASONE PROPIONATE 50 MCG
SPRAY, SUSPENSION (ML) NASAL
Qty: 48 G | Refills: 1 | Status: SHIPPED | OUTPATIENT
Start: 2024-10-27

## 2024-12-23 DIAGNOSIS — J30.1 ALLERGIC RHINITIS DUE TO POLLEN, UNSPECIFIED SEASONALITY: ICD-10-CM

## 2024-12-23 DIAGNOSIS — J01.00 ACUTE NON-RECURRENT MAXILLARY SINUSITIS: ICD-10-CM

## 2024-12-23 RX ORDER — FLUTICASONE PROPIONATE 50 MCG
SPRAY, SUSPENSION (ML) NASAL
Qty: 48 G | Refills: 1 | Status: SHIPPED | OUTPATIENT
Start: 2024-12-23

## 2024-12-23 NOTE — TELEPHONE ENCOUNTER
Care Due:                  Date            Visit Type   Department     Provider  --------------------------------------------------------------------------------                                MYCHART                              ANNUAL                              CHECKUP/PHY  MET INTERNAL  Last Visit: 03-      S            SHANNON Reid  Next Visit: None Scheduled  None         None Found                                                            Last  Test          Frequency    Reason                     Performed    Due Date  --------------------------------------------------------------------------------    CMP.........  12 months..  lisinopriL, simvastatin..  03- 02-    Lipid Panel.  12 months..  simvastatin..............  03- 02-    Health [facility] Embedded Care Due Messages. Reference number: [id number].   12/23/2024 12:30:09 PM CST

## 2025-03-15 DIAGNOSIS — E55.9 VITAMIN D INSUFFICIENCY: ICD-10-CM

## 2025-03-15 DIAGNOSIS — Z00.00 WELL ADULT EXAM: Primary | ICD-10-CM

## 2025-03-15 DIAGNOSIS — E78.49 OTHER HYPERLIPIDEMIA: ICD-10-CM

## 2025-03-15 DIAGNOSIS — J45.41 MODERATE PERSISTENT ASTHMA WITH ACUTE EXACERBATION: ICD-10-CM

## 2025-03-15 DIAGNOSIS — Z12.5 PROSTATE CANCER SCREENING: ICD-10-CM

## 2025-03-15 NOTE — TELEPHONE ENCOUNTER
Care Due:                  Date            Visit Type   Department     Provider  --------------------------------------------------------------------------------                                MYCHART                              ANNUAL                              CHECKUP/PHY  MET INTERNAL  Last Visit: 03-      S            SHANNON Reid  Next Visit: None Scheduled  None         None Found                                                            Last  Test          Frequency    Reason                     Performed    Due Date  --------------------------------------------------------------------------------    Office Visit  15 months..  amLODIPine, fluticasone,   03- 05-                             lisinopriL, simvastatin..    CMP.........  12 months..  lisinopriL, simvastatin..  03- 02-    Lipid Panel.  12 months..  simvastatin..............  03- 02-    [facility] Embedded Care Due Messages. Reference number: [id number].   3/15/2025 4:35:34 AM CDT

## 2025-03-15 NOTE — TELEPHONE ENCOUNTER
Refill Routing Note   Medication(s) are not appropriate for processing by [facility] for the following reason(s):        Required vitals outdated  No active prescription written by provider    [facility] action(s):  Defer   Requires labs : Yes - CMP, Lipid panel            Appointments  past 12m or future 3m with PCP    Date Provider   Last Visit   3/7/2024 Hung Reid MD   Next Visit   Visit date not found Hung Reid MD   ED visits in past 90 days: 0        Note composed:12:30 PM 03/15/2025

## 2025-03-16 RX ORDER — FLUTICASONE FUROATE AND VILANTEROL TRIFENATATE 100; 25 UG/1; UG/1
POWDER RESPIRATORY (INHALATION)
Qty: 180 EACH | Refills: 0 | Status: SHIPPED | OUTPATIENT
Start: 2025-03-16

## 2025-03-26 ENCOUNTER — PATIENT OUTREACH (OUTPATIENT)
Dept: ADMINISTRATIVE | Facility: HOSPITAL | Age: 40
End: 2025-03-26
Payer: COMMERCIAL

## 2025-04-28 DIAGNOSIS — I10 ESSENTIAL HYPERTENSION: Chronic | ICD-10-CM

## 2025-04-28 DIAGNOSIS — E78.49 OTHER HYPERLIPIDEMIA: Chronic | ICD-10-CM

## 2025-04-28 RX ORDER — SIMVASTATIN 40 MG/1
40 TABLET, FILM COATED ORAL NIGHTLY
Qty: 90 TABLET | Refills: 0 | Status: SHIPPED | OUTPATIENT
Start: 2025-04-28

## 2025-04-28 RX ORDER — LISINOPRIL 20 MG/1
20 TABLET ORAL
Qty: 90 TABLET | Refills: 0 | Status: SHIPPED | OUTPATIENT
Start: 2025-04-28

## 2025-04-28 RX ORDER — AMLODIPINE BESYLATE 5 MG/1
5 TABLET ORAL
Qty: 90 TABLET | Refills: 0 | Status: SHIPPED | OUTPATIENT
Start: 2025-04-28

## 2025-04-28 NOTE — TELEPHONE ENCOUNTER
Refill Routing Note   Medication(s) are not appropriate for processing by [facility] for the following reason(s):        Required vitals outdated  Required labs outdated    [facility] action(s):  Defer               Appointments  past 12m or future 3m with PCP    Date Provider   Last Visit   3/7/2024 Hung Reid MD   Next Visit   Visit date not found Hung Reid MD   ED visits in past 90 days: 0        Note composed:5:48 AM 04/28/2025

## 2025-04-28 NOTE — TELEPHONE ENCOUNTER
No care due was identified.  [facility] Embedded Care Due Messages. Reference number: [id number].   4/28/2025 4:25:52 AM CDT

## 2025-05-12 DIAGNOSIS — J45.41 MODERATE PERSISTENT ASTHMA WITH ACUTE EXACERBATION: ICD-10-CM

## 2025-05-12 RX ORDER — FLUTICASONE FUROATE AND VILANTEROL 100; 25 UG/1; UG/1
1 POWDER RESPIRATORY (INHALATION) DAILY
Qty: 180 EACH | Refills: 0 | Status: SHIPPED | OUTPATIENT
Start: 2025-05-12

## 2025-05-12 NOTE — TELEPHONE ENCOUNTER
No care due was identified.  Health [facility] Embedded Care Due Messages. Reference number: [id number].   5/12/2025 12:29:01 PM CDT   no edema,  no murmurs,  regular rate and rhythm , no edema. , no edema,  no murmurs,  regular rate and rhythm , no edema.

## 2025-05-13 ENCOUNTER — E-VISIT (OUTPATIENT)
Dept: PRIMARY CARE CLINIC | Facility: CLINIC | Age: 40
End: 2025-05-13
Payer: COMMERCIAL

## 2025-05-13 DIAGNOSIS — R05.9 COUGH, UNSPECIFIED TYPE: Primary | ICD-10-CM

## 2025-05-14 ENCOUNTER — TELEPHONE (OUTPATIENT)
Dept: INTERNAL MEDICINE | Facility: CLINIC | Age: 40
End: 2025-05-14
Payer: COMMERCIAL

## 2025-05-14 DIAGNOSIS — J45.909 ASTHMA, UNSPECIFIED ASTHMA SEVERITY, UNSPECIFIED WHETHER COMPLICATED, UNSPECIFIED WHETHER PERSISTENT: Primary | ICD-10-CM

## 2025-05-14 RX ORDER — CODEINE PHOSPHATE AND GUAIFENESIN 10; 100 MG/5ML; MG/5ML
5 SOLUTION ORAL 3 TIMES DAILY PRN
Qty: 118 ML | Refills: 0 | Status: SHIPPED | OUTPATIENT
Start: 2025-05-14 | End: 2025-05-24

## 2025-05-14 RX ORDER — PREDNISONE 20 MG/1
20 TABLET ORAL 2 TIMES DAILY
Qty: 8 TABLET | Refills: 0 | Status: SHIPPED | OUTPATIENT
Start: 2025-05-14 | End: 2025-05-18

## 2025-05-14 NOTE — PROGRESS NOTES
Patient ID: Roman Quinn is a 39 y.o. male.    Chief Complaint: General Illness (Entered automatically based on patient selection in [employer].)    The patient initiated a request through [employer] on 5/13/2025 for evaluation and management with a chief complaint of General Illness (Entered automatically based on patient selection in [employer].)     I evaluated the questionnaire submission on 05/14/2025 .    Ohs Peq Evisit Supergroup-Cough And Cold    5/13/2025 12:09 PM CDT - Filed by Patient   What do you need help with? Cough, Cold, Sore Throat   Do you agree to participate in an E-Visit? Yes   If you have any of the following symptoms, go to your local emergency room or call 911: I acknowledge   What is the main issue you would like addressed today? Chest cold - have had about 5 days and nonstop coughing without much mucus coming up. However, nasal running has also progressed as of the past 24 hours. Need relief.   Do you think you might have COVID-19 or the Flu? No   What symptoms do you currently have?  Cough;  Runny nose   Describe your cough: Comes and goes;  Does not stop;  Interferes with daily activities   Have you ever smoked? Never smoked   Do you have a fever? No   When did your concern begin? 5/9/2025   In the last two weeks, have you been in close contact with someone who has COVID-19, the Flu, or strep throat? No   What have you tried to help your symptoms? Drinking more fluids;  Hot shower;  Rest and sleep   On a scale of 1-10, where 10 is the worst you can imagine, how severe are your symptoms? (range: 1 - 10) 6   How have your symptoms changed since they first started? Worsened   Do you have transportation to an [facility] location to get tested for COVID-19? Yes   Provide any additional information you feel is important. The cough is non-stop and I just feel like I have something in my chest, but it never stops.   Please attach any relevant images or files    Are you able to take your vital signs?  No         Encounter Diagnosis   Name Primary?    Cough, unspecified type Yes        No orders of the defined types were placed in this encounter.     Medications Ordered This Encounter   Medications    guaiFENesin-codeine 100-10 mg/5 ml (TUSSI-ORGANIDIN NR)  mg/5 mL syrup     Sig: Take 5 mLs by mouth 3 (three) times daily as needed for Cough.     Dispense:  118 mL     Refill:  0     I have reviewed the Prescription Drug Monitoring Program (PDMP) database for this patient prior to prescribing the above opioid medication:   Yes    predniSONE (DELTASONE) 20 MG tablet     Sig: Take 1 tablet (20 mg total) by mouth 2 (two) times daily. for 4 days     Dispense:  8 tablet     Refill:  0        No follow-ups on file.      E-Visit Time Tracking:    Day 1 Time (in minutes): 7  Cheratussin p.r.n. with 4 days of prednisone.  Total Time (in minutes): 7

## 2025-05-30 ENCOUNTER — PATIENT MESSAGE (OUTPATIENT)
Dept: INTERNAL MEDICINE | Facility: CLINIC | Age: 40
End: 2025-05-30
Payer: COMMERCIAL

## 2025-06-02 ENCOUNTER — RESULTS FOLLOW-UP (OUTPATIENT)
Dept: INTERNAL MEDICINE | Facility: CLINIC | Age: 40
End: 2025-06-02

## 2025-06-02 ENCOUNTER — LAB VISIT (OUTPATIENT)
Dept: LAB | Facility: HOSPITAL | Age: 40
End: 2025-06-02
Attending: FAMILY MEDICINE
Payer: COMMERCIAL

## 2025-06-02 ENCOUNTER — OFFICE VISIT (OUTPATIENT)
Dept: INTERNAL MEDICINE | Facility: CLINIC | Age: 40
End: 2025-06-02
Payer: COMMERCIAL

## 2025-06-02 VITALS
HEART RATE: 95 BPM | TEMPERATURE: 97 F | DIASTOLIC BLOOD PRESSURE: 82 MMHG | OXYGEN SATURATION: 97 % | RESPIRATION RATE: 18 BRPM | SYSTOLIC BLOOD PRESSURE: 130 MMHG | WEIGHT: 265.44 LBS | BODY MASS INDEX: 35.95 KG/M2 | HEIGHT: 72 IN

## 2025-06-02 DIAGNOSIS — J45.41 MODERATE PERSISTENT ASTHMA WITH ACUTE EXACERBATION: ICD-10-CM

## 2025-06-02 DIAGNOSIS — E78.49 OTHER HYPERLIPIDEMIA: ICD-10-CM

## 2025-06-02 DIAGNOSIS — Z00.00 WELL ADULT EXAM: ICD-10-CM

## 2025-06-02 DIAGNOSIS — R73.03 PREDIABETES: ICD-10-CM

## 2025-06-02 DIAGNOSIS — Z12.5 PROSTATE CANCER SCREENING: ICD-10-CM

## 2025-06-02 DIAGNOSIS — E66.01 SEVERE OBESITY (BMI 35.0-39.9) WITH COMORBIDITY: ICD-10-CM

## 2025-06-02 DIAGNOSIS — I10 ESSENTIAL HYPERTENSION: ICD-10-CM

## 2025-06-02 DIAGNOSIS — E55.9 VITAMIN D INSUFFICIENCY: ICD-10-CM

## 2025-06-02 DIAGNOSIS — Z00.00 WELL ADULT EXAM: Primary | ICD-10-CM

## 2025-06-02 LAB
25(OH)D3+25(OH)D2 SERPL-MCNC: 28 NG/ML (ref 30–96)
ABSOLUTE EOSINOPHIL (OHS): 0.13 K/UL
ABSOLUTE MONOCYTE (OHS): 0.51 K/UL (ref 0.3–1)
ABSOLUTE NEUTROPHIL COUNT (OHS): 2.51 K/UL (ref 1.8–7.7)
ALBUMIN SERPL BCP-MCNC: 4 G/DL (ref 3.5–5.2)
ALP SERPL-CCNC: 58 UNIT/L (ref 40–150)
ALT SERPL W/O P-5'-P-CCNC: 25 UNIT/L (ref 10–44)
ANION GAP (OHS): 8 MMOL/L (ref 8–16)
AST SERPL-CCNC: 32 UNIT/L (ref 11–45)
BASOPHILS # BLD AUTO: 0.04 K/UL
BASOPHILS NFR BLD AUTO: 0.8 %
BILIRUB SERPL-MCNC: 0.5 MG/DL (ref 0.1–1)
BUN SERPL-MCNC: 16 MG/DL (ref 6–20)
CALCIUM SERPL-MCNC: 8.7 MG/DL (ref 8.7–10.5)
CHLORIDE SERPL-SCNC: 105 MMOL/L (ref 95–110)
CHOLEST SERPL-MCNC: 183 MG/DL (ref 120–199)
CHOLEST/HDLC SERPL: 4 {RATIO} (ref 2–5)
CO2 SERPL-SCNC: 26 MMOL/L (ref 23–29)
CREAT SERPL-MCNC: 0.9 MG/DL (ref 0.5–1.4)
EAG (OHS): 117 MG/DL (ref 68–131)
ERYTHROCYTE [DISTWIDTH] IN BLOOD BY AUTOMATED COUNT: 12.8 % (ref 11.5–14.5)
GFR SERPLBLD CREATININE-BSD FMLA CKD-EPI: >60 ML/MIN/1.73/M2
GLUCOSE SERPL-MCNC: 110 MG/DL (ref 70–110)
HBA1C MFR BLD: 5.7 % (ref 4–5.6)
HCT VFR BLD AUTO: 44.8 % (ref 40–54)
HDLC SERPL-MCNC: 46 MG/DL (ref 40–75)
HDLC SERPL: 25.1 % (ref 20–50)
HGB BLD-MCNC: 14.8 GM/DL (ref 14–18)
IMM GRANULOCYTES # BLD AUTO: 0.01 K/UL (ref 0–0.04)
IMM GRANULOCYTES NFR BLD AUTO: 0.2 % (ref 0–0.5)
LDLC SERPL CALC-MCNC: 119.4 MG/DL (ref 63–159)
LYMPHOCYTES # BLD AUTO: 1.56 K/UL (ref 1–4.8)
MCH RBC QN AUTO: 30 PG (ref 27–31)
MCHC RBC AUTO-ENTMCNC: 33 G/DL (ref 32–36)
MCV RBC AUTO: 91 FL (ref 82–98)
NONHDLC SERPL-MCNC: 137 MG/DL
NUCLEATED RBC (/100WBC) (OHS): 0 /100 WBC
PLATELET # BLD AUTO: 246 K/UL (ref 150–450)
PMV BLD AUTO: 10.3 FL (ref 9.2–12.9)
POTASSIUM SERPL-SCNC: 4.2 MMOL/L (ref 3.5–5.1)
PROT SERPL-MCNC: 6.8 GM/DL (ref 6–8.4)
PSA SERPL-MCNC: 2.23 NG/ML
RBC # BLD AUTO: 4.94 M/UL (ref 4.6–6.2)
RELATIVE EOSINOPHIL (OHS): 2.7 %
RELATIVE LYMPHOCYTE (OHS): 32.8 % (ref 18–48)
RELATIVE MONOCYTE (OHS): 10.7 % (ref 4–15)
RELATIVE NEUTROPHIL (OHS): 52.8 % (ref 38–73)
SODIUM SERPL-SCNC: 139 MMOL/L (ref 136–145)
T4 FREE SERPL-MCNC: 1.11 NG/DL (ref 0.71–1.51)
TRIGL SERPL-MCNC: 88 MG/DL (ref 30–150)
TSH SERPL-ACNC: 1.2 UIU/ML (ref 0.4–4)
WBC # BLD AUTO: 4.76 K/UL (ref 3.9–12.7)

## 2025-06-02 PROCEDURE — 36415 COLL VENOUS BLD VENIPUNCTURE: CPT | Mod: PO

## 2025-06-02 PROCEDURE — 3075F SYST BP GE 130 - 139MM HG: CPT | Mod: CPTII,S$GLB,, | Performed by: FAMILY MEDICINE

## 2025-06-02 PROCEDURE — 3044F HG A1C LEVEL LT 7.0%: CPT | Mod: CPTII,S$GLB,, | Performed by: FAMILY MEDICINE

## 2025-06-02 PROCEDURE — 84439 ASSAY OF FREE THYROXINE: CPT

## 2025-06-02 PROCEDURE — 85025 COMPLETE CBC W/AUTO DIFF WBC: CPT

## 2025-06-02 PROCEDURE — 3008F BODY MASS INDEX DOCD: CPT | Mod: CPTII,S$GLB,, | Performed by: FAMILY MEDICINE

## 2025-06-02 PROCEDURE — 80053 COMPREHEN METABOLIC PANEL: CPT

## 2025-06-02 PROCEDURE — 84443 ASSAY THYROID STIM HORMONE: CPT

## 2025-06-02 PROCEDURE — 80061 LIPID PANEL: CPT

## 2025-06-02 PROCEDURE — 1160F RVW MEDS BY RX/DR IN RCRD: CPT | Mod: CPTII,S$GLB,, | Performed by: FAMILY MEDICINE

## 2025-06-02 PROCEDURE — 1159F MED LIST DOCD IN RCRD: CPT | Mod: CPTII,S$GLB,, | Performed by: FAMILY MEDICINE

## 2025-06-02 PROCEDURE — 84153 ASSAY OF PSA TOTAL: CPT

## 2025-06-02 PROCEDURE — 3079F DIAST BP 80-89 MM HG: CPT | Mod: CPTII,S$GLB,, | Performed by: FAMILY MEDICINE

## 2025-06-02 PROCEDURE — 83036 HEMOGLOBIN GLYCOSYLATED A1C: CPT

## 2025-06-02 PROCEDURE — 82306 VITAMIN D 25 HYDROXY: CPT

## 2025-06-02 PROCEDURE — 99999 PR PBB SHADOW E&M-EST. PATIENT-LVL IV: CPT | Mod: PBBFAC,,, | Performed by: FAMILY MEDICINE

## 2025-06-02 PROCEDURE — 4010F ACE/ARB THERAPY RXD/TAKEN: CPT | Mod: CPTII,S$GLB,, | Performed by: FAMILY MEDICINE

## 2025-06-02 PROCEDURE — 99396 PREV VISIT EST AGE 40-64: CPT | Mod: S$GLB,,, | Performed by: FAMILY MEDICINE

## 2025-06-02 RX ORDER — AMLODIPINE BESYLATE 5 MG/1
5 TABLET ORAL DAILY
Qty: 90 TABLET | Refills: 3 | Status: SHIPPED | OUTPATIENT
Start: 2025-06-02

## 2025-06-02 RX ORDER — LISINOPRIL 20 MG/1
20 TABLET ORAL DAILY
Qty: 90 TABLET | Refills: 3 | Status: SHIPPED | OUTPATIENT
Start: 2025-06-02

## 2025-06-02 RX ORDER — FLUTICASONE FUROATE AND VILANTEROL 100; 25 UG/1; UG/1
1 POWDER RESPIRATORY (INHALATION) DAILY
Qty: 180 EACH | Refills: 3 | Status: SHIPPED | OUTPATIENT
Start: 2025-06-02

## 2025-06-02 RX ORDER — FLUTICASONE PROPIONATE 50 MCG
SPRAY, SUSPENSION (ML) NASAL
Qty: 48 G | Refills: 3 | Status: SHIPPED | OUTPATIENT
Start: 2025-06-02

## 2025-06-02 RX ORDER — SIMVASTATIN 40 MG/1
40 TABLET, FILM COATED ORAL NIGHTLY
Qty: 90 TABLET | Refills: 3 | Status: SHIPPED | OUTPATIENT
Start: 2025-06-02

## 2025-06-05 ENCOUNTER — TELEPHONE (OUTPATIENT)
Dept: INTERNAL MEDICINE | Facility: CLINIC | Age: 40
End: 2025-06-05
Payer: COMMERCIAL

## 2025-06-13 ENCOUNTER — TELEPHONE (OUTPATIENT)
Dept: PULMONOLOGY | Facility: CLINIC | Age: 40
End: 2025-06-13
Payer: COMMERCIAL

## 2025-06-13 DIAGNOSIS — J45.909 ASTHMA, UNSPECIFIED ASTHMA SEVERITY, UNSPECIFIED WHETHER COMPLICATED, UNSPECIFIED WHETHER PERSISTENT: Primary | ICD-10-CM

## 2025-06-13 NOTE — TELEPHONE ENCOUNTER
Referral placed for asthma. Patient scheduled with Dr Eric on 6/27/25 at 11am with pft's prior at 9:45am. Appointment mailed.

## 2025-06-20 ENCOUNTER — OFFICE VISIT (OUTPATIENT)
Dept: PULMONOLOGY | Facility: CLINIC | Age: 40
End: 2025-06-20
Payer: COMMERCIAL

## 2025-06-20 ENCOUNTER — HOSPITAL ENCOUNTER (OUTPATIENT)
Dept: PULMONOLOGY | Facility: CLINIC | Age: 40
Discharge: HOME | End: 2025-06-20
Payer: COMMERCIAL

## 2025-06-20 ENCOUNTER — PATIENT MESSAGE (OUTPATIENT)
Dept: PULMONOLOGY | Facility: CLINIC | Age: 40
End: 2025-06-20

## 2025-06-20 ENCOUNTER — RESULTS FOLLOW-UP (OUTPATIENT)
Dept: PULMONOLOGY | Facility: CLINIC | Age: 40
End: 2025-06-20

## 2025-06-20 ENCOUNTER — HOSPITAL ENCOUNTER (OUTPATIENT)
Dept: RADIOLOGY | Facility: HOSPITAL | Age: 40
Discharge: HOME | End: 2025-06-20
Attending: STUDENT IN AN ORGANIZED HEALTH CARE EDUCATION/TRAINING PROGRAM
Payer: COMMERCIAL

## 2025-06-20 VITALS
BODY MASS INDEX: 35.76 KG/M2 | OXYGEN SATURATION: 98 % | HEIGHT: 72 IN | SYSTOLIC BLOOD PRESSURE: 148 MMHG | WEIGHT: 264 LBS | DIASTOLIC BLOOD PRESSURE: 74 MMHG | HEART RATE: 101 BPM

## 2025-06-20 DIAGNOSIS — J45.909 ASTHMA, UNSPECIFIED ASTHMA SEVERITY, UNSPECIFIED WHETHER COMPLICATED, UNSPECIFIED WHETHER PERSISTENT: ICD-10-CM

## 2025-06-20 DIAGNOSIS — J45.40 MODERATE PERSISTENT ASTHMA WITHOUT COMPLICATION: Primary | ICD-10-CM

## 2025-06-20 LAB
DLCO ADJ PRE: 35.7 ML/(MIN*MMHG) (ref 27.66–41.51)
DLCO SINGLE BREATH LLN: 27.66
DLCO SINGLE BREATH PRE REF: 103.8 %
DLCO SINGLE BREATH REF: 34.58
DLCOC SBVA LLN: 3.43
DLCOC SBVA PRE REF: 113.2 %
DLCOC SBVA REF: 4.59
DLCOC SINGLE BREATH LLN: 27.66
DLCOC SINGLE BREATH PRE REF: 103.2 %
DLCOC SINGLE BREATH REF: 34.58
DLCOCSBVAULN: 5.75
DLCOCSINGLEBREATHULN: 41.51
DLCOCSINGLEBREATHZSCORE: 0.27
DLCOSINGLEBREATHULN: 41.51
DLCOSINGLEBREATHZSCORE: 0.31
DLCOVA LLN: 3.43
DLCOVA PRE REF: 113.8 %
DLCOVA PRE: 5.23 ML/(MIN*MMHG*L) (ref 3.43–5.75)
DLCOVA REF: 4.59
DLCOVAULN: 5.75
DLVAADJ PRE: 5.2 ML/(MIN*MMHG*L) (ref 3.43–5.75)
ERV LLN: -16448.51
ERV PRE REF: 34.5 %
ERV REF: 1.49
ERVULN: ABNORMAL
FEF 25 75 LLN: 2.43
FEF 25 75 PRE REF: 62.2 %
FEF 25 75 REF: 4.21
FET100 CHG: 1.5 %
FEV05 LLN: 2.19
FEV05 REF: 3.32
FEV1 CHG: 6 %
FEV1 FVC LLN: 70
FEV1 FVC PRE REF: 90.6 %
FEV1 FVC REF: 80
FEV1 LLN: 3.51
FEV1 PRE REF: 80.7 %
FEV1 REF: 4.44
FEV1 VOL CHG: 0.21
FRCPLETH LLN: 2.57
FRCPLETH PREREF: 76.1 %
FRCPLETH REF: 3.56
FRCPLETHULN: 4.55
FVC CHG: 4.8 %
FVC LLN: 4.43
FVC PRE REF: 88.6 %
FVC REF: 5.58
FVC VOL CHG: 0.24
IVC PRE: 5.04 L (ref 4.43–6.74)
IVC SINGLE BREATH LLN: 4.43
IVC SINGLE BREATH PRE REF: 90.4 %
IVC SINGLE BREATH REF: 5.58
IVCSINGLEBREATHULN: 6.74
LLN IC: -9999996.13
PEF LLN: 8.15
PEF PRE REF: 84.3 %
PEF REF: 10.61
POST FEF 25 75: 2.94 L/S (ref 2.43–6.48)
POST FET 100: 6 SEC
POST FEV1 FVC: 73.38 % (ref 69.63–88.88)
POST FEV1: 3.8 L (ref 3.51–5.33)
POST FEV5: 2.76 L (ref 2.19–4.46)
POST FVC: 5.18 L (ref 4.43–6.74)
POST PEF: 7.85 L/S (ref 8.15–13.06)
PRE DLCO: 35.9 ML/(MIN*MMHG) (ref 27.66–41.51)
PRE ERV: 0.51 L (ref -16448.51–16451.49)
PRE FEF 25 75: 2.62 L/S (ref 2.43–6.48)
PRE FET 100: 5.92 SEC
PRE FEV05 REF: 76.9 %
PRE FEV1 FVC: 72.56 % (ref 69.63–88.88)
PRE FEV1: 3.58 L (ref 3.51–5.33)
PRE FEV5: 2.55 L (ref 2.19–4.46)
PRE FRC PL: 2.71 L (ref 2.57–4.55)
PRE FVC: 4.94 L (ref 4.43–6.74)
PRE IC: 4.53 L (ref -9999996.13–#######.####)
PRE PEF: 8.94 L/S (ref 8.15–13.06)
PRE REF IC: 117 %
PRE RV: 2.19 L (ref 1.39–2.74)
PRE TLC: 7.23 L (ref 6.38–8.68)
RAW PRE REF: 119 %
RAW PRE: 3.64 CMH2O*S/L (ref 3.06–3.06)
RAW REF: 3.06
REF IC: 3.87
RV LLN: 1.39
RV PRE REF: 106 %
RV REF: 2.07
RVTLC LLN: 21
RVTLC PRE REF: 101.2 %
RVTLC PRE: 30.32 % (ref 20.97–38.93)
RVTLC REF: 30
RVTLCULN: 39
RVULN: 2.74
SGAW PRE REF: 81.7 %
SGAW PRE: 0.07 1/(CMH2O*S) (ref 0.08–0.08)
SGAW REF: 0.08
TLC LLN: 6.38
TLC PRE REF: 96 %
TLC REF: 7.53
TLC ULN: 8.68
ULN IC: ABNORMAL
VA PRE: 6.87 L (ref 7.38–7.38)
VA SINGLE BREATH LLN: 7.38
VA SINGLE BREATH PRE REF: 93.1 %
VA SINGLE BREATH REF: 7.38
VASINGLEBREATHULN: 7.38
VC LLN: 4.43
VC PRE REF: 90.4 %
VC PRE: 5.04 L (ref 4.43–6.74)
VC REF: 5.58
VC ULN: 6.74

## 2025-06-20 PROCEDURE — 99999 PR PBB SHADOW E&M-EST. PATIENT-LVL IV: CPT | Mod: PBBFAC,,, | Performed by: STUDENT IN AN ORGANIZED HEALTH CARE EDUCATION/TRAINING PROGRAM

## 2025-06-20 PROCEDURE — 71046 X-RAY EXAM CHEST 2 VIEWS: CPT | Mod: TC,FY

## 2025-06-20 PROCEDURE — 71046 X-RAY EXAM CHEST 2 VIEWS: CPT | Mod: 26,,, | Performed by: RADIOLOGY

## 2025-06-20 RX ORDER — FLUTICASONE FUROATE, UMECLIDINIUM BROMIDE AND VILANTEROL TRIFENATATE 200; 62.5; 25 UG/1; UG/1; UG/1
1 POWDER RESPIRATORY (INHALATION) DAILY
Qty: 90 EACH | Refills: 3 | Status: SHIPPED | OUTPATIENT
Start: 2025-06-20

## 2025-06-20 RX ORDER — TIOTROPIUM BROMIDE 18 UG/1
18 CAPSULE ORAL; RESPIRATORY (INHALATION) DAILY
Qty: 30 CAPSULE | Refills: 0 | Status: CANCELLED | OUTPATIENT
Start: 2025-06-20 | End: 2026-06-20

## 2025-06-20 RX ORDER — FLUTICASONE FUROATE AND VILANTEROL 200; 25 UG/1; UG/1
1 POWDER RESPIRATORY (INHALATION) DAILY
Qty: 60 EACH | Refills: 3 | Status: CANCELLED | OUTPATIENT
Start: 2025-06-20

## 2025-06-20 RX ORDER — PREDNISONE 20 MG/1
TABLET ORAL
Qty: 18 TABLET | Refills: 0 | Status: SHIPPED | OUTPATIENT
Start: 2025-06-20 | End: 2025-07-05

## 2025-06-20 NOTE — PROGRESS NOTES
[facility]    SUBJECTIVE:     Chief Complaint: asthma    History of Present Illness:  Roman Quinn is a 39 y.o. male with a history of HTN, HLD, asthma, and allergic rhinitis who presents for evaluation of asthma.     Patient with long-standing history of asthma dating back to childhood. Persistent symptoms during adulthood. He had an admission in 2018 for asthma exacerbation and pneumomediastinum, was started on Breo at that time and had significant improvement in his symptoms. He reports that he typically uses albuterol 2-3 times per week and has about 2 flares a year requiring steroids. He has no nocturnal symptoms.     However, he had a viral respiratory illness in 5/2025. Reports productive cough, dyspnea. He was treated with cough suppressant and a course of prednisone. His symptoms improved for about a week before he started to developed a persist dry cough with associated SOB and intermittent wheezing. He notices some improvement in his cough with his albuterol inhaler. He was having a cough at night until he started using Wilmer's vapor rub on his chest and his cough has since resolved at night. He denies any sinus congestion or post-nasal drip. Had some acid reflux related to dietary changes which have since resolved.     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       Past Medical History:   Diagnosis Date    Allergic conjunctivitis     Asthma     Hyperlipidemia     Hypertension      Past Surgical History:   Procedure Laterality Date    HERNIA REPAIR      VASECTOMY       Family History   Problem Relation Name Age  of Onset    Diabetes Mother      Cancer Maternal Grandmother      Cancer Paternal Grandmother      No Known Problems Father      No Known Problems Sister      Skin cancer Neg Hx      Melanoma Neg Hx      Allergic rhinitis Neg Hx      Asthma Neg Hx      Immunodeficiency Neg Hx      Rhinitis Neg Hx      Urticaria Neg Hx      Eczema Neg Hx      Atopy Neg Hx      Angioedema Neg Hx      Allergies Neg Hx       Social History[1]        OBJECTIVE:     Vital Signs  Vitals:    06/20/25 1319   BP: (!) 148/74   BP Location: Left arm   Patient Position: Sitting   Pulse: 101   SpO2: 98%   Weight: 119.7 kg (264 lb)   Height: 6' (1.829 m)     Body mass index is 35.8 kg/m².    Physical Exam:  Physical Exam  GEN: young man, sitting in clinic in NAD  HEENT: face symmetric, MMM, conjunctivae anicteric  CV: regular rhythm, normal rate, no audible murmurs  PULM: CTAB, no wheezing, dry cough throughout visit  Abd: non-distended  Ext: no LE edema  Neuro: alert, answering questions appropriately     Laboratory:  CBC  Lab Results   Component Value Date    WBC 4.76 06/02/2025    HGB 14.8 06/02/2025    HCT 44.8 06/02/2025     06/02/2025    MCV 91 06/02/2025    RDW 12.8 06/02/2025     BMP  Lab Results   Component Value Date     06/02/2025    K 4.2 06/02/2025     06/02/2025    CO2 26 06/02/2025    BUN 16 06/02/2025    CREATININE 0.9 06/02/2025     06/02/2025    CALCIUM 8.7 06/02/2025    MG 2.4 10/18/2018    PHOS 2.3 (L) 10/18/2018     LFTs  Lab Results   Component Value Date    PROT 6.8 06/02/2025    ALBUMIN 4.0 06/02/2025    BILITOT 0.5 06/02/2025    AST 32 06/02/2025    ALKPHOS 58 06/02/2025    ALT 25 06/02/2025       PFTs 6/20/25  FEV1/FVC 73,   FEV1 3.58 (81%), %change 6  FVC 4.94 (87%), %change 5  TLC 7.23 (96%)  RV 2.19 (106%)  DLCO 35.7 (103%)      Chest Imaging, My Impression:   CT Chest 10/2018  IMPRESSION:      Extensive pneumomediastinum extending into the neck base, correlating with the 10/17/2018 chest  radiograph.  No clear etiology.  No pneumothorax or fluid collections.  No lung consolidation.     Fatty change in the liver.    CXR 6/20/25  Findings: Heart size is normal. Lungs are clear. The bones are noncontributory.       ASSESSMENT/PLAN:     Problem Noted   Moderate Asthma 6/20/2025    Asthma since childhood, greatly improved after initiation of LABA/ICS in 2018 but continues to require steroids about twice a year and albuterol several times per week. Now with worsening symptoms since viral illness in 5/2025 with persistent dry cough and intermittent dyspnea likely reflecting undertreated asthma. Sinus symptoms well-controlled.        Problem List Items Addressed This Visit          Pulmonary    Moderate asthma - Primary    Overview   Asthma since childhood, greatly improved after initiation of LABA/ICS in 2018 but continues to require steroids about twice a year and albuterol several times per week. Now with worsening symptoms since viral illness in 5/2025 with persistent dry cough and intermittent dyspnea likely reflecting undertreated asthma. Sinus symptoms well-controlled.          Current Assessment & Plan   - CXR without opacity   - start prednisone taper, 40mg daily x5 days, 20mg daily x5 days, 10mg daily x5 days  - start Trellegy daily   - continue albuterol PRN   - if persistent cough may need to switch ACEi          RTC 4 weeks     Shasta Puga MD  Pulmonary & Critical Care Fellow         [1]   Social History  Socioeconomic History    Marital status:    Occupational History    Occupation:      Employer: [employer]     Employer: [employer]   Tobacco Use    Smoking status: Never    Smokeless tobacco: Never   Substance and Sexual Activity    Alcohol use: Yes     Comment: 10 drinks per week. Wine, Beer or mixed drinks.    Drug use: No    Sexual activity: Yes     Partners: Female     Social Drivers of Health     Financial Resource Strain: Low Risk  (5/30/2025)    Overall Financial  Resource Strain (CARDIA)     Difficulty of Paying Living Expenses: Not hard at all   Food Insecurity: No Food Insecurity (5/30/2025)    Hunger Vital Sign     Worried About Running Out of Food in the Last Year: Never true     Ran Out of Food in the Last Year: Never true   Transportation Needs: No Transportation Needs (5/30/2025)    PRAPARE - Transportation     Lack of Transportation (Medical): No     Lack of Transportation (Non-Medical): No   Physical Activity: Insufficiently Active (5/30/2025)    Exercise Vital Sign     Days of Exercise per Week: 3 days     Minutes of Exercise per Session: 40 min   Stress: No Stress Concern Present (5/30/2025)    [employer] - Occupational Stress Questionnaire     Feeling of Stress : Only a little   Housing Stability: Low Risk  (5/30/2025)    Housing Stability Vital Sign     Unable to Pay for Housing in the Last Year: No     Number of Times Moved in the Last Year: 0     Homeless in the Last Year: No

## 2025-06-21 NOTE — ASSESSMENT & PLAN NOTE
- CXR without opacity   - start prednisone taper, 40mg daily x5 days, 20mg daily x5 days, 10mg daily x5 days  - start Trellegy daily   - continue albuterol PRN   - if persistent cough may need to switch ACEi

## 2025-06-22 LAB
DLCO ADJ PRE: 35.7 ML/(MIN*MMHG) (ref 27.66–41.51)
DLCO SINGLE BREATH LLN: 27.66
DLCO SINGLE BREATH PRE REF: 103.8 %
DLCO SINGLE BREATH REF: 34.58
DLCOC SBVA LLN: 3.43
DLCOC SBVA PRE REF: 113.2 %
DLCOC SBVA REF: 4.59
DLCOC SINGLE BREATH LLN: 27.66
DLCOC SINGLE BREATH PRE REF: 103.2 %
DLCOC SINGLE BREATH REF: 34.58
DLCOCSBVAULN: 5.75
DLCOCSINGLEBREATHULN: 41.51
DLCOCSINGLEBREATHZSCORE: 0.27
DLCOSINGLEBREATHULN: 41.51
DLCOSINGLEBREATHZSCORE: 0.31
DLCOVA LLN: 3.43
DLCOVA PRE REF: 113.8 %
DLCOVA PRE: 5.23 ML/(MIN*MMHG*L) (ref 3.43–5.75)
DLCOVA REF: 4.59
DLCOVAULN: 5.75
DLVAADJ PRE: 5.2 ML/(MIN*MMHG*L) (ref 3.43–5.75)
ERV LLN: -16448.51
ERV PRE REF: 34.5 %
ERV REF: 1.49
ERVULN: ABNORMAL
FEF 25 75 LLN: 2.43
FEF 25 75 PRE REF: 62.2 %
FEF 25 75 REF: 4.21
FET100 CHG: 1.5 %
FEV05 LLN: 2.19
FEV05 REF: 3.32
FEV1 CHG: 6 %
FEV1 FVC LLN: 70
FEV1 FVC PRE REF: 90.6 %
FEV1 FVC REF: 80
FEV1 LLN: 3.51
FEV1 PRE REF: 80.7 %
FEV1 REF: 4.44
FEV1 VOL CHG: 0.21
FRCPLETH LLN: 2.57
FRCPLETH PREREF: 76.1 %
FRCPLETH REF: 3.56
FRCPLETHULN: 4.55
FVC CHG: 4.8 %
FVC LLN: 4.43
FVC PRE REF: 88.6 %
FVC REF: 5.58
FVC VOL CHG: 0.24
IVC PRE: 5.04 L (ref 4.43–6.74)
IVC SINGLE BREATH LLN: 4.43
IVC SINGLE BREATH PRE REF: 90.4 %
IVC SINGLE BREATH REF: 5.58
IVCSINGLEBREATHULN: 6.74
LLN IC: -9999996.13
PEF LLN: 8.15
PEF PRE REF: 84.3 %
PEF REF: 10.61
PHYSICIAN COMMENT: ABNORMAL
POST FEF 25 75: 2.94 L/S (ref 2.43–6.48)
POST FET 100: 6 SEC
POST FEV1 FVC: 73.38 % (ref 69.63–88.88)
POST FEV1: 3.8 L (ref 3.51–5.33)
POST FEV5: 2.76 L (ref 2.19–4.46)
POST FVC: 5.18 L (ref 4.43–6.74)
POST PEF: 7.85 L/S (ref 8.15–13.06)
PRE DLCO: 35.9 ML/(MIN*MMHG) (ref 27.66–41.51)
PRE ERV: 0.51 L (ref -16448.51–16451.49)
PRE FEF 25 75: 2.62 L/S (ref 2.43–6.48)
PRE FET 100: 5.92 SEC
PRE FEV05 REF: 76.9 %
PRE FEV1 FVC: 72.56 % (ref 69.63–88.88)
PRE FEV1: 3.58 L (ref 3.51–5.33)
PRE FEV5: 2.55 L (ref 2.19–4.46)
PRE FRC PL: 2.71 L (ref 2.57–4.55)
PRE FVC: 4.94 L (ref 4.43–6.74)
PRE IC: 4.53 L (ref -9999996.13–#######.####)
PRE PEF: 8.94 L/S (ref 8.15–13.06)
PRE REF IC: 117 %
PRE RV: 2.19 L (ref 1.39–2.74)
PRE TLC: 7.23 L (ref 6.38–8.68)
RAW PRE REF: 119 %
RAW PRE: 3.64 CMH2O*S/L (ref 3.06–3.06)
RAW REF: 3.06
REF IC: 3.87
RV LLN: 1.39
RV PRE REF: 106 %
RV REF: 2.07
RVTLC LLN: 21
RVTLC PRE REF: 101.2 %
RVTLC PRE: 30.32 % (ref 20.97–38.93)
RVTLC REF: 30
RVTLCULN: 39
RVULN: 2.74
SGAW PRE REF: 81.7 %
SGAW PRE: 0.07 1/(CMH2O*S) (ref 0.08–0.08)
SGAW REF: 0.08
TLC LLN: 6.38
TLC PRE REF: 96 %
TLC REF: 7.53
TLC ULN: 8.68
ULN IC: ABNORMAL
VA PRE: 6.87 L (ref 7.38–7.38)
VA SINGLE BREATH LLN: 7.38
VA SINGLE BREATH PRE REF: 93.1 %
VA SINGLE BREATH REF: 7.38
VASINGLEBREATHULN: 7.38
VC LLN: 4.43
VC PRE REF: 90.4 %
VC PRE: 5.04 L (ref 4.43–6.74)
VC REF: 5.58
VC ULN: 6.74

## 2025-06-23 ENCOUNTER — TELEPHONE (OUTPATIENT)
Dept: PULMONOLOGY | Facility: CLINIC | Age: 40
End: 2025-06-23
Payer: COMMERCIAL

## 2025-06-23 NOTE — TELEPHONE ENCOUNTER
I was contacted by Dr. Shasta Puga to help with inahler affordability for this patient.    I contacted [facility] pharmacy to give  coupon information.    [employer] Coupon Info:  BIN:  [id number]  PCN: [id number]  RxGrp: [id number]  ID:  [id number]    [facility] unable to reprocess claim until the medication comes in the order (5 pm today).  Messaged patient to make him aware of this.    Joss Moralez, PharmD  Clinical Pharmacist - Allergy/Pulmonary

## 2025-06-27 ENCOUNTER — TELEPHONE (OUTPATIENT)
Dept: PULMONOLOGY | Facility: CLINIC | Age: 40
End: 2025-06-27
Payer: COMMERCIAL

## 2025-06-27 NOTE — TELEPHONE ENCOUNTER
Telephone call to patient, breathing has improved significant with Trelegy. Still having a persistent cough thought, sometimes with small amount of clear phlegm. Discussed that cough can be seen with lisinopril and may be worth reaching out to his PCP to discuss alternative medication to see if this is contributing to cough. This would also help ensure we are adequately and appropriately treating his asthma.     Shasta Puga MD  PCCM Fellow

## 2025-07-15 DIAGNOSIS — I10 ESSENTIAL HYPERTENSION: ICD-10-CM

## 2025-07-15 DIAGNOSIS — J45.41 MODERATE PERSISTENT ASTHMA WITH ACUTE EXACERBATION: ICD-10-CM

## 2025-07-15 RX ORDER — LISINOPRIL 20 MG/1
20 TABLET ORAL DAILY
Qty: 90 TABLET | Refills: 3 | Status: SHIPPED | OUTPATIENT
Start: 2025-07-15

## 2025-07-15 RX ORDER — AMLODIPINE BESYLATE 5 MG/1
5 TABLET ORAL DAILY
Qty: 90 TABLET | Refills: 3 | Status: SHIPPED | OUTPATIENT
Start: 2025-07-15

## 2025-07-15 RX ORDER — FLUTICASONE FUROATE AND VILANTEROL 100; 25 UG/1; UG/1
1 POWDER RESPIRATORY (INHALATION) DAILY
Qty: 180 EACH | Refills: 3 | Status: SHIPPED | OUTPATIENT
Start: 2025-07-15

## 2025-07-15 NOTE — TELEPHONE ENCOUNTER
No care due was identified.  [facility] Embedded Care Due Messages. Reference number: [id number].   7/15/2025 2:08:24 PM CDT

## 2025-07-18 ENCOUNTER — TELEPHONE (OUTPATIENT)
Dept: DERMATOLOGY | Facility: CLINIC | Age: 40
End: 2025-07-18
Payer: COMMERCIAL

## 2025-07-21 ENCOUNTER — OFFICE VISIT (OUTPATIENT)
Dept: DERMATOLOGY | Facility: CLINIC | Age: 40
End: 2025-07-21
Payer: COMMERCIAL

## 2025-07-21 DIAGNOSIS — D22.9 MULTIPLE BENIGN NEVI: ICD-10-CM

## 2025-07-21 DIAGNOSIS — L82.1 SEBORRHEIC KERATOSES: ICD-10-CM

## 2025-07-21 DIAGNOSIS — D18.01 CHERRY ANGIOMA: ICD-10-CM

## 2025-07-21 DIAGNOSIS — D23.9 DERMATOFIBROMA: ICD-10-CM

## 2025-07-21 DIAGNOSIS — Z12.83 SCREENING EXAM FOR SKIN CANCER: Primary | ICD-10-CM

## 2025-07-21 DIAGNOSIS — L81.4 LENTIGO: ICD-10-CM

## 2025-07-21 PROCEDURE — 4010F ACE/ARB THERAPY RXD/TAKEN: CPT | Mod: CPTII,S$GLB,, | Performed by: STUDENT IN AN ORGANIZED HEALTH CARE EDUCATION/TRAINING PROGRAM

## 2025-07-21 PROCEDURE — 1159F MED LIST DOCD IN RCRD: CPT | Mod: CPTII,S$GLB,, | Performed by: STUDENT IN AN ORGANIZED HEALTH CARE EDUCATION/TRAINING PROGRAM

## 2025-07-21 PROCEDURE — 99203 OFFICE O/P NEW LOW 30 MIN: CPT | Mod: S$GLB,,, | Performed by: STUDENT IN AN ORGANIZED HEALTH CARE EDUCATION/TRAINING PROGRAM

## 2025-07-21 PROCEDURE — 99999 PR PBB SHADOW E&M-EST. PATIENT-LVL III: CPT | Mod: PBBFAC,,, | Performed by: STUDENT IN AN ORGANIZED HEALTH CARE EDUCATION/TRAINING PROGRAM

## 2025-07-21 PROCEDURE — 1160F RVW MEDS BY RX/DR IN RCRD: CPT | Mod: CPTII,S$GLB,, | Performed by: STUDENT IN AN ORGANIZED HEALTH CARE EDUCATION/TRAINING PROGRAM

## 2025-07-21 PROCEDURE — 3044F HG A1C LEVEL LT 7.0%: CPT | Mod: CPTII,S$GLB,, | Performed by: STUDENT IN AN ORGANIZED HEALTH CARE EDUCATION/TRAINING PROGRAM

## 2025-07-21 NOTE — PROGRESS NOTES
Subjective:      Patient ID:  Roman Quinn is a 39 y.o. male who presents for   Chief Complaint   Patient presents with    Skin Check     UBSE     Roman Quinn is a 39 y.o. male who presents for: UBSE screening exam for skin cancer.    New patient    The patient has the following concern: mole  Location: chest  Duration: years  Symptoms: none    Patient with new area of concern: lesion  Location: R forehead  Duration: few weeks to month  Symptoms: none    Pertinent history:  History of blistering sunburns: No  History of tanning bed use: No  Family history of melanoma: No  Personal history of mole removal: No  Personal history of skin cancer: No          Review of Systems    Objective:   Physical Exam   Constitutional: He appears well-developed and well-nourished. No distress.   Neurological: He is alert and oriented to person, place, and time. He is not disoriented.   Psychiatric: He has a normal mood and affect.   Skin:   Areas Examined (abnormalities noted in diagram):   Scalp / Hair Palpated and Inspected  Head / Face Inspection Performed  Neck Inspection Performed  Chest / Axilla Inspection Performed  Abdomen Inspection Performed  Back Inspection Performed  RUE Inspected  LUE Inspection Performed                     Diagram Legend     Erythematous scaling macule/papule c/w actinic keratosis       Vascular papule c/w angioma      Pigmented verrucoid papule/plaque c/w seborrheic keratosis      Yellow umbilicated papule c/w sebaceous hyperplasia      Irregularly shaped tan macule c/w lentigo     1-2 mm smooth white papules consistent with Milia      Movable subcutaneous cyst with punctum c/w epidermal inclusion cyst      Subcutaneous movable cyst c/w pilar cyst      Firm pink to brown papule c/w dermatofibroma      Pedunculated fleshy papule(s) c/w skin tag(s)      Evenly pigmented macule c/w junctional nevus     Mildly variegated pigmented, slightly irregular-bordered macule c/w mildly atypical  nevus      Flesh colored to evenly pigmented papule c/w intradermal nevus       Pink pearly papule/plaque c/w basal cell carcinoma      Erythematous hyperkeratotic cursted plaque c/w SCC      Surgical scar with no sign of skin cancer recurrence      Open and closed comedones      Inflammatory papules and pustules      Verrucoid papule consistent consistent with wart     Erythematous eczematous patches and plaques     Dystrophic onycholytic nail with subungual debris c/w onychomycosis     Umbilicated papule    Erythematous-base heme-crusted tan verrucoid plaque consistent with inflamed seborrheic keratosis     Erythematous Silvery Scaling Plaque c/w Psoriasis     See annotation      Assessment / Plan:        Screening exam for skin cancer  Upper body skin examination performed today including at least 6 points as noted in physical examination. No lesions suspicious for malignancy noted.    Recommend daily sun protection/avoidance and use of at least SPF 30, broad spectrum sunscreen (OTC drug).     Cherry angioma  Seborrheic keratoses  Dermatofibroma  Multiple benign nevi  Lentigo   - minor problem and chronic.   Reassurance given to patient. No treatment necessary.              Follow up if symptoms worsen or fail to improve.

## 2025-07-21 NOTE — PATIENT INSTRUCTIONS
Sunscreen Guidelines  Sunscreen protects your skin by absorbing and reflecting ultraviolet rays. All sunscreens have a sun protection factor (SPF) rating that indicates how long a sunscreen will remain effective on the skin.    Why protect your skin?  The sun's rays are composed of many different wavelengths, including UVA, UVB, and visible light that each affect the skin differently.    UVB: sunburn, photoaging, skin cancer (melanoma, basal cell, and squamous cell carcinomas) and modulation of the skin's immune system.    UVA: similar to above but thought to contribute more to aging; at the same dose of UVB it is less powerful however the sun has 10-20 times the levels of UVA as compared with UVB.  Visible light: implicated in causing unwanted darkening of skin, such as melasma and post-inflammatory hyperpigmentation in darker skin types     If I have dark skin, do I need to worry about the sun?    More darkly pigmented skin is more protected against UV-induced skin cancer, sunburn, and photoaging, though may still suffer from sun-related conditions, including melasma, hyperpigmentation, and other dark spots.    Sun avoidance  As a general rule, stay out of the sun as much as possible between 10 a.m. - 4 p.m.    Download the EPA UV index pepe to track the UV index by hour in your zip code.      Which sunscreen should I choose?  The best sunscreen to use varies by individual. The one that feels best on your skin and fits your lifestyle will be the one you will likely use most regularly.   Active ingredients of sunscreen vary by , and may be a chemical (such as avobenzone or oxybenzone) or physical agent (such as zinc oxide or titanium dioxide). I recommend a physical agent.  A water-resistant sunscreen is one that maintains the SPF level after 40 minutes of water exposure. A very water-resistant sunscreen maintains the SPF level after 80 minutes of water exposure.    Sunscreen: this is the last layer in  "sun protection   Be generous: 1 shot glass of sunscreen for your body, ½ teaspoon for your face/neck  Reapply every 2 hours  Broad spectrum (provides UVA/UVB protection), SPF 50 or above  Avoid spray sunscreens: less effective and have been found to contain benzene (carcinogen)    Sun protective clothing  Although sunscreen helps minimize sun damage, no sunscreen completely blocks all wavelengths of UV light. Wearing sun protective clothing such as hats, rashguards or swim shirts, and long sleeves and/or pants, as well as avoiding sun exposure from 10 a.m. to 4 p.m. will help protect your skin from overexposure and minimize sun damage. Seek shade.  Long sleeved clothing, hats, and sunglasses: makes sun protection easier, more effective, and can even be more affordable, since sunscreen needs to be reapplied frequently.    Solumbra (www.sunprectautions.Ducksboard)  Apertus Pharmaceuticals (www.Suo Yi)  Coolibar (www.[employer] Generation Systems.Ducksboard)  Land's end (www.Azevan Pharmaceuticals)  Hats from [employer] Your Style [employer]zipped (www.helenkaminski.com)    My Favorite Sunscreens:  Physical blockers: Can have a "white case" but in general are more effective  - Face: CeraVe tinted mineral sunscreen, Bare Minerals complexion rescue (20 shades), Elta MD (UV elements, UV physical, UV restore, etc), Tizo ultra zinc tinted, Cetaphil Sheer Mineral Face Liquid Sunscreen  - Body: Blue Lizard, Neutrogena Sheer Zinc, Eucerin Daily Protection, Aveeno Baby   "

## 2025-07-24 NOTE — PROGRESS NOTES
[facility] Pulmonary Note  Chief Complaint:   Follow up    Subjective:      History of Present Illness:  Roman Quinn is a 39 y.o. male with PMHx of asthma and obesity, who presents to the [facility] for follow up.     Patient was previously seen by Dr. Puga on 6/20/2025. At that time, patient was seen for worsening cough and intermittent dyspnea since a viral illness on 5/2025. Patient was using his albuterol several times per week and requiring intermittent steroids. During that visit, patient was prescribed Trelegy 200 and a prednisone taper which he completed about 2 weeks ago.     Since then, patient states that he has no other symptoms and his cough has completely resolved.  Patient states that he is taking both the Trelegy 200 and Breo 100. He has no other complaints at this time.     Comprehensive Pulmonary Review:  Tobacco use never  Vaping or marijuana use never  Substance use never  Occupational exposures (asbestos, silica, mold) , office job  Environmental/hobby exposures (birds, pets, farm, molds, fumes) none  Travel history (endemic fungal, TB, tropical diseases)none  Housing status/hx incarceration never  History of allergy/atopy (environmental, food, medications) iodine  Familial pulmonary disease (COPD, fibrosis, rheumatologic, sarcoidosis) none      Past Medical History:  Past Medical History:   Diagnosis Date    Allergic conjunctivitis     Asthma     Hyperlipidemia     Hypertension      Past Surgical History:  Past Surgical History:   Procedure Laterality Date    HERNIA REPAIR      VASECTOMY         Family History:  Family History   Problem Relation Name Age of Onset    Diabetes Mother      Cancer Maternal Grandmother      Cancer Paternal Grandmother      No Known Problems Father      No Known Problems Sister      Skin cancer Neg Hx      Melanoma Neg Hx      Allergic rhinitis Neg Hx      Asthma Neg Hx      Immunodeficiency Neg Hx      Rhinitis Neg  Hx      Urticaria Neg Hx      Eczema Neg Hx      Atopy Neg Hx      Angioedema Neg Hx      Allergies Neg Hx       Allergies:  Review of patient's allergies indicates:   Allergen Reactions    Iodine and iodide containing products Anaphylaxis     Throat closing & itching.     Medications:  Current Outpatient Medications   Medication Instructions    albuterol (PROVENTIL/VENTOLIN HFA) 90 mcg/actuation inhaler USE 2 INHALATIONS BY MOUTH INTO  THE LUNGS EVERY 6 HOURS AS  NEEDED FOR WHEEZING OR SHORTNESS OF BREATH (RESCUE INHALER)    albuterol-ipratropium (DUO-NEB) 2.5 mg-0.5 mg/3 mL nebulizer solution 3 mLs, Nebulization, Every 6 hours PRN, Rescue    amLODIPine (NORVASC) 5 mg, Oral, Daily    fluticasone furoate-vilanteroL (BREO ELLIPTA) 100-25 mcg/dose diskus inhaler 1 puff, Inhalation, Daily, Controller    fluticasone propionate (FLONASE) 50 mcg/actuation nasal spray SHAKE LIQUID AND USE 2 SPRAYS(100 MCG) IN EACH NOSTRIL TWICE DAILY    fluticasone-umeclidin-vilanter (TRELEGY ELLIPTA) 200-62.5-25 mcg inhaler 1 puff, Inhalation, Daily    lisinopriL (PRINIVIL,ZESTRIL) 20 mg, Oral, Daily    ondansetron (ZOFRAN-ODT) 8 mg, Oral, 3 times daily PRN    simvastatin (ZOCOR) 40 mg, Oral, Nightly     Review of Systems:  Pulmonary related symptoms as per HPI.  Gen: no weight loss, no fever, no night sweats  HEENT: no sinus congestion, no throat clearing  CV: no chest pain, no orthopnea, no paroxysmal nocturnal dyspnea  GI: no melena, no hematochezia, no diarrhea, no constipation.  : no dysuria, no hematuria, no incontinence  Neuro: no focal weakness, able to ambulate  Extremities: no edema, no joint pain or swelling  Sleep: no snoring; sleep quality adequate    Objective:     Vitals:    07/25/25 1303   BP: 130/88   BP Location: Right arm   Patient Position: Sitting   Pulse: 104   SpO2: 97%   Weight: 119.6 kg (263 lb 10.7 oz)   Height: 6' (1.829 m)     Body mass index is 35.76 kg/m².  Physical Examination:  General: no acute  "distress  Eyes: normal conjunctivae  Nose: no erythema, no discharge, no polyps  Neck: no jugular venous distension, trachea midline, no lymphadenopathy, no masses  Lungs: normal chest wall, no accessory muscle use, symmetric chest expansion, normal fremitus, normal resonance to percussion, bilateral breath sounds, no wheezes, no crackles, no rhonchi, no squeaks, no rubs  Heart: regular rate and rhythm, no murmur  Abdomen: non-distended, non-tender  Extremities: no cyanosis, no edema, no clubbing  Skin: no rashes  Neurologic: alert, oriented    Laboratory:  Most Recent Data:  CBC:   Lab Results   Component Value Date    WBC 4.76 06/02/2025    HGB 14.8 06/02/2025     06/02/2025    MCV 91 06/02/2025    RDW 12.8 06/02/2025     BMP:   Lab Results   Component Value Date     06/02/2025    K 4.2 06/02/2025     06/02/2025    CO2 26 06/02/2025    BUN 16 06/02/2025    CREATININE 0.9 06/02/2025     06/02/2025    CALCIUM 8.7 06/02/2025    MG 2.4 10/18/2018    PHOS 2.3 (L) 10/18/2018     LFTs:   Lab Results   Component Value Date    PROT 6.8 06/02/2025    ALBUMIN 4.0 06/02/2025    AST 32 06/02/2025    ALKPHOS 58 06/02/2025    ALT 25 06/02/2025     BNP: No results found for: "BNP"      Pulmonary Function Test/Six Minute Walk Test:  6/22/2025        Radiology:  6/20/2025       Assessment and Plan:         Follow-up exam  Uncomplicated asthma, unspecified asthma severity, unspecified whether persistent  39M with childhood asthma who presents as a follow up. Patient had good response after starting Trelegy 200. Patient is also taking Breo 100 along with his Trelegy and has completed his prednisone taper. PFT showed obstruction and improvement with bronchodilator of about 300cc.     - will discontinue Breo 100  - continue Trelegy  - follow up as needed        Medication Refills today:  - None       Disposition:     Return to clinic in prn.    All Other Scheduled Appointments:   Future Appointments   Date " Time Provider Department Center   7/25/2025  1:00 PM Patricia Daigle MD [facility]   8/21/2025  8:00 AM LAB, [facility] [place]   8/28/2025 10:30 AM Hung Reid MD [facility] [place]       Patricia Daigle MD  [facility] Pulmonary & Critical Care Fellow

## 2025-07-25 ENCOUNTER — OFFICE VISIT (OUTPATIENT)
Dept: PULMONOLOGY | Facility: CLINIC | Age: 40
End: 2025-07-25
Payer: COMMERCIAL

## 2025-07-25 VITALS
DIASTOLIC BLOOD PRESSURE: 88 MMHG | OXYGEN SATURATION: 97 % | HEIGHT: 72 IN | WEIGHT: 263.69 LBS | SYSTOLIC BLOOD PRESSURE: 130 MMHG | BODY MASS INDEX: 35.72 KG/M2 | HEART RATE: 104 BPM

## 2025-07-25 DIAGNOSIS — J45.40 MODERATE PERSISTENT ASTHMA WITHOUT COMPLICATION: Primary | ICD-10-CM

## 2025-07-25 DIAGNOSIS — Z09 FOLLOW-UP EXAM: ICD-10-CM

## 2025-07-25 PROCEDURE — 99999 PR PBB SHADOW E&M-EST. PATIENT-LVL III: CPT | Mod: PBBFAC,,,

## 2025-07-25 NOTE — PROGRESS NOTES
I saw & examined the patient. I personally reviewed all pertinent data in Epic. I discussed the patient and management with the fellow. I reviewed the fellows note and agree with the documented findings and plan of care with the following additions/modifications:    Asthma  Chronic cough  Pt is feeling much improved since changing to trelegy 200. He has still been taking breo 100 in addition to trelegy. Okay to stop breo now. If symptoms are not controlled with trelegy 200 alone, then can consider adding airsupra.    Landen Meyer MD  [facility]

## 2025-08-21 ENCOUNTER — LAB VISIT (OUTPATIENT)
Dept: LAB | Facility: HOSPITAL | Age: 40
End: 2025-08-21
Attending: FAMILY MEDICINE
Payer: COMMERCIAL

## 2025-08-21 DIAGNOSIS — R73.03 PREDIABETES: ICD-10-CM

## 2025-08-21 LAB
EAG (OHS): 105 MG/DL (ref 68–131)
HBA1C MFR BLD: 5.3 % (ref 4–5.6)

## 2025-08-21 PROCEDURE — 83036 HEMOGLOBIN GLYCOSYLATED A1C: CPT

## 2025-08-21 PROCEDURE — 36415 COLL VENOUS BLD VENIPUNCTURE: CPT | Mod: PO

## 2025-08-28 ENCOUNTER — OFFICE VISIT (OUTPATIENT)
Dept: INTERNAL MEDICINE | Facility: CLINIC | Age: 40
End: 2025-08-28
Payer: COMMERCIAL

## 2025-08-28 VITALS
RESPIRATION RATE: 18 BRPM | HEART RATE: 86 BPM | HEIGHT: 72 IN | WEIGHT: 263.88 LBS | SYSTOLIC BLOOD PRESSURE: 130 MMHG | DIASTOLIC BLOOD PRESSURE: 82 MMHG | OXYGEN SATURATION: 98 % | TEMPERATURE: 98 F | BODY MASS INDEX: 35.74 KG/M2

## 2025-08-28 DIAGNOSIS — R73.03 PREDIABETES: Primary | ICD-10-CM

## 2025-08-28 PROCEDURE — 4010F ACE/ARB THERAPY RXD/TAKEN: CPT | Mod: CPTII,S$GLB,, | Performed by: FAMILY MEDICINE

## 2025-08-28 PROCEDURE — 3079F DIAST BP 80-89 MM HG: CPT | Mod: CPTII,S$GLB,, | Performed by: FAMILY MEDICINE

## 2025-08-28 PROCEDURE — 3008F BODY MASS INDEX DOCD: CPT | Mod: CPTII,S$GLB,, | Performed by: FAMILY MEDICINE

## 2025-08-28 PROCEDURE — 1159F MED LIST DOCD IN RCRD: CPT | Mod: CPTII,S$GLB,, | Performed by: FAMILY MEDICINE

## 2025-08-28 PROCEDURE — G2211 COMPLEX E/M VISIT ADD ON: HCPCS | Mod: S$GLB,,, | Performed by: FAMILY MEDICINE

## 2025-08-28 PROCEDURE — 99213 OFFICE O/P EST LOW 20 MIN: CPT | Mod: S$GLB,,, | Performed by: FAMILY MEDICINE

## 2025-08-28 PROCEDURE — 1160F RVW MEDS BY RX/DR IN RCRD: CPT | Mod: CPTII,S$GLB,, | Performed by: FAMILY MEDICINE

## 2025-08-28 PROCEDURE — 3044F HG A1C LEVEL LT 7.0%: CPT | Mod: CPTII,S$GLB,, | Performed by: FAMILY MEDICINE

## 2025-08-28 PROCEDURE — 3075F SYST BP GE 130 - 139MM HG: CPT | Mod: CPTII,S$GLB,, | Performed by: FAMILY MEDICINE

## 2025-08-28 PROCEDURE — 99999 PR PBB SHADOW E&M-EST. PATIENT-LVL IV: CPT | Mod: PBBFAC,,, | Performed by: FAMILY MEDICINE
